# Patient Record
Sex: FEMALE | Race: WHITE | NOT HISPANIC OR LATINO | Employment: OTHER | URBAN - METROPOLITAN AREA
[De-identification: names, ages, dates, MRNs, and addresses within clinical notes are randomized per-mention and may not be internally consistent; named-entity substitution may affect disease eponyms.]

---

## 2017-01-04 ENCOUNTER — OFFICE VISIT (OUTPATIENT)
Dept: FAMILY MEDICINE CLINIC | Facility: CLINIC | Age: 82
End: 2017-01-04

## 2017-01-04 VITALS
SYSTOLIC BLOOD PRESSURE: 124 MMHG | WEIGHT: 129 LBS | TEMPERATURE: 97.7 F | OXYGEN SATURATION: 98 % | HEIGHT: 68 IN | HEART RATE: 98 BPM | BODY MASS INDEX: 19.55 KG/M2 | DIASTOLIC BLOOD PRESSURE: 66 MMHG

## 2017-01-04 DIAGNOSIS — R30.0 BURNING WITH URINATION: Primary | ICD-10-CM

## 2017-01-04 LAB
BACTERIA UR QL AUTO: ABNORMAL /HPF
BILIRUB UR QL STRIP: NEGATIVE
CLARITY UR: CLEAR
COLOR UR: YELLOW
GLUCOSE UR STRIP-MCNC: NEGATIVE MG/DL
HGB UR QL STRIP.AUTO: NEGATIVE
KETONES UR QL STRIP: NEGATIVE
LEUKOCYTE ESTERASE UR QL STRIP.AUTO: ABNORMAL
MUCOUS THREADS URNS QL MICRO: ABNORMAL /HPF
NITRITE UR QL STRIP: NEGATIVE
PH UR STRIP.AUTO: 6 [PH] (ref 4.6–8)
PROT UR QL STRIP: NEGATIVE
RBC # UR: ABNORMAL /HPF
REF LAB TEST METHOD: ABNORMAL
SP GR UR STRIP: 1.02 (ref 1–1.03)
SQUAMOUS #/AREA URNS HPF: ABNORMAL /HPF
UROBILINOGEN UR QL STRIP: ABNORMAL
WBC UR QL AUTO: ABNORMAL /HPF

## 2017-01-04 PROCEDURE — 81001 URINALYSIS AUTO W/SCOPE: CPT | Performed by: FAMILY MEDICINE

## 2017-01-04 PROCEDURE — 99213 OFFICE O/P EST LOW 20 MIN: CPT | Performed by: FAMILY MEDICINE

## 2017-01-04 RX ORDER — CIPROFLOXACIN 500 MG/1
500 TABLET, FILM COATED ORAL 2 TIMES DAILY
Qty: 14 TABLET | Refills: 0 | Status: SHIPPED | OUTPATIENT
Start: 2017-01-04 | End: 2017-01-11

## 2017-01-04 NOTE — PROGRESS NOTES
SUBJECTIVE:  The patient is a 81-year-old white female with symptoms of dysuria for about a week.  No fever or chills.  She has a history urinary tract infections and states this feels as if it is one.     PAST MEDICAL HISTORY:  Reviewed.    REVIEW OF SYSTEMS:  Please see above; 14 point ROS otherwise negative.      OBJECTIVE: Vitals signs are reviewed and are stable.         Heart:  Regular rate and rhythm.    Abdomen:   Soft, nontender.    Urinalysis: 6-12 WBCs      ASSESSMENT:     Acute cystitis    PLAN:   Cipro 500 twice a day.  Follow-up in 2 days if no better.  Notify sooner if problems.    Much of this encounter note is an electronic transcription/translation of spoken language to printed text.  The electronic translation of spoken language may permit erroneous, or at times, nonsensical words or phrases to be inadvertently transcribed.  Although I have reviewed the note for such errors, some may still exist.

## 2017-01-04 NOTE — MR AVS SNAPSHOT
Alona Cisneros   1/4/2017 4:10 PM   Office Visit    Dept Phone:  [phone]   Encounter #:  [id number]    Provider:  Pierre Ibrahim MD   Department:  [facility] FAMILY AND INTERNAL MED                Your Full Care Plan              Today's Medication Changes          These changes are accurate as of: 1/4/17  9:25 AM.  If you have any questions, ask your nurse or doctor.               New Medication(s)Ordered:     ciprofloxacin 500 MG tablet   Commonly known as:  CIPRO   Take 1 tablet by mouth 2 (Two) Times a Day for 7 days.   Started by:  Pierre Ibrahim MD            Where to Get Your Medications      These medications were sent to [address] ([place], [place] - [address] [facility] - [phone]  - [phone] FX  [address] [facility] [address]     Phone:  [phone]     ciprofloxacin 500 MG tablet                  Your Updated Medication List          This list is accurate as of: 1/4/17  9:25 AM.  Always use your most recent med list.                alendronate 70 MG tablet   Commonly known as:  FOSAMAX   Take 1 tablet by mouth every 7 days.       amLODIPine 5 MG tablet   Commonly known as:  NORVASC       aspirin 81 MG chewable tablet       ciprofloxacin 500 MG tablet   Commonly known as:  CIPRO   Take 1 tablet by mouth 2 (Two) Times a Day for 7 days.       citalopram 40 MG tablet   Commonly known as:  CELEXA   Take 1 tablet by mouth daily.       MV-ONE PO       primidone 50 MG tablet   Commonly known as:  MYSOLINE               We Performed the Following     Urinalysis With Microscopic     Urinalysis, Microscopic Only     Urinalysis       You Were Diagnosed With        Codes Comments    Burning with urination    -  Primary ICD-10-CM: R30.0  ICD-9-CM: 788.1       Instructions     None    Patient Instructions History      Upcoming Appointments     Visit Type Date Time Department    SAME DAY 1/4/2017  4:10 PM LUZ DE ANDA  "MARIAN      [employer] Signup     [facility] [employer] allows you to send messages to your doctor, view your test results, renew your prescriptions, schedule appointments, and more. To sign up, go to [employer] and click on the Sign Up Now link in the New User? box. Enter your [employer] Activation Code exactly as it appears below along with the last four digits of your Social Security Number and your Date of Birth () to complete the sign-up process. If you do not sign up before the expiration date, you must request a new code.    [employer] Activation Code: [email or web address]  Expires: 2017  9:25 AM    If you have questions, you can email [email or web address] or call [phone] to talk to our [employer] staff. Remember, [employer] is NOT to be used for urgent needs. For medical emergencies, dial 911.               Other Info from Your Visit           Your Appointments     2017  4:10 PM EST   Same Day with Pierre Ibrahim MD   [facility] FAMILY AND INTERNAL MED (--)    [address] [place] [id number] [phone]              Allergies     No Known Allergies      Reason for Visit     Urinary Tract Infection x's 4 days, mainly in the morning, burning, frequency       Vital Signs     Blood Pressure Pulse Temperature Height Weight Oxygen Saturation    124/66 (BP Location: Left arm, Patient Position: Sitting, Cuff Size: Adult) 98 97.7 °F (36.5 °C) (Oral) 68\" (172.7 cm) 129 lb (58.5 kg) 98%    Body Mass Index Smoking Status                19.61 kg/m2 Former Smoker          Problems and Diagnoses Noted     Burning with urination    -  Primary      Results     Urinalysis With Microscopic           Urinalysis      Component Value Standard Range & Units    Color, UA Yellow Yellow, Straw    Appearance, UA Clear Clear    pH, UA 6.0 4.6 - 8.0    Specific Gravity, UA 1.020 1.001 - 1.035    Glucose, UA Negative Negative    Ketones, UA Negative Negative    Bilirubin, UA " Negative Negative    Blood, UA Negative Negative    Protein, UA Negative Negative    Leuk Esterase, UA Small (1+) Negative    Nitrite, UA Negative Negative    Urobilinogen, UA 0.2 E.U./dL 0.2 - 1.0 E.U./dL                Urinalysis, Microscopic Only      Component Value Standard Range & Units    RBC, UA None Seen None Seen /HPF    WBC, UA 6-12 None Seen /HPF    Bacteria, UA None Seen None Seen /HPF    Squamous Epithelial Cells, UA 3-6 None Seen, 0-2 /HPF    Mucus, UA  None Seen, Trace /HPF    Mucus     Methodology Manual Light Microscopy

## 2017-01-09 ENCOUNTER — TELEPHONE (OUTPATIENT)
Dept: FAMILY MEDICINE CLINIC | Facility: CLINIC | Age: 82
End: 2017-01-09

## 2017-01-09 NOTE — TELEPHONE ENCOUNTER
----- Message from Pierre Ibrahim MD sent at 1/9/2017  4:06 PM EST -----  Contact: [phone]   May be she should call [facility]  ----- Message -----     From: Johana Adorno MA     Sent: 1/9/2017   3:37 PM       To: Pierre Ibrahim MD    Its 53.00 and that's the cheapest on her insurance  ----- Message -----     From: Pierre Ibrahim MD     Sent: 1/9/2017   3:27 PM       To: Johana Adorno MA    See if we have respaclick samples to give her.  If not see if  pharmacist can give cheaper version  ----- Message -----     From: Johana Adorno MA     Sent: 1/9/2017   3:19 PM       To: Pierre Ibrahim MD        ----- Message -----     From: Amaris Hutchison     Sent: 1/9/2017   3:14 PM       To: Johana Adorno MA    SHE MENTIONED SHE DIDN'T  THE FOLLOWING MEDICATION BECAUSE IT WAS TO EXPENSIVE.  SHE SAID IT WAS 50 DOLLARS.   VENTOLIN  (90 BASE) MCG/ACT inhaler 1 inhaler  Sig: INHALE TWO PUFFS BY MOUTH EVERY 6 HOURS AS NEEDED FOR WHEEZING/ COUGH            She will check prices at [facility]

## 2017-01-10 RX ORDER — ALBUTEROL SULFATE 90 UG/1
2 AEROSOL, METERED RESPIRATORY (INHALATION) EVERY 4 HOURS PRN
Qty: 1 INHALER | Refills: 11 | Status: SHIPPED | OUTPATIENT
Start: 2017-01-10 | End: 2020-02-26

## 2017-01-13 ENCOUNTER — TELEPHONE (OUTPATIENT)
Dept: FAMILY MEDICINE CLINIC | Facility: CLINIC | Age: 82
End: 2017-01-13

## 2017-01-13 NOTE — TELEPHONE ENCOUNTER
PT INFORMED NEEDS TO GO TO URGENT CARE OR [facility]    ----- Message from Gudelia Maurice sent at 1/13/2017  1:15 PM EST -----  Contact: PATIENT [phone]  PATIENT HAS A COUGH AND SHORTNESS OF BREATH. PATIENT HAS GONE THROUGH 2 BOTTLES OF MUCINEX AND HAS AN INHALER. EITHER ONE IS WORKING. IS THERE SOMETHING OTC OR COULD BE CALLED IN THAT PATIENT CAN TAKE         [facility] [address] [place] [address], [place] - [address] [facility] - [phone] [facility] [phone]  [phone] (Phone)

## 2017-01-16 DIAGNOSIS — D72.819 LEUKOPENIA, UNSPECIFIED TYPE: Primary | ICD-10-CM

## 2017-01-17 ENCOUNTER — OFFICE VISIT (OUTPATIENT)
Dept: FAMILY MEDICINE CLINIC | Facility: CLINIC | Age: 82
End: 2017-01-17

## 2017-01-17 VITALS
BODY MASS INDEX: 18.34 KG/M2 | RESPIRATION RATE: 22 BRPM | TEMPERATURE: 97.8 F | HEIGHT: 68 IN | WEIGHT: 121 LBS | DIASTOLIC BLOOD PRESSURE: 79 MMHG | HEART RATE: 100 BPM | SYSTOLIC BLOOD PRESSURE: 120 MMHG | OXYGEN SATURATION: 95 %

## 2017-01-17 DIAGNOSIS — D72.819 LEUKOPENIA, UNSPECIFIED TYPE: ICD-10-CM

## 2017-01-17 DIAGNOSIS — J10.1 INFLUENZA A: Primary | ICD-10-CM

## 2017-01-17 LAB
ERYTHROCYTE [DISTWIDTH] IN BLOOD BY AUTOMATED COUNT: 14 % (ref 4.5–15)
HCT VFR BLD AUTO: 42.1 % (ref 31–42)
HGB BLD-MCNC: 14.6 G/DL (ref 12–18)
LYMPHOCYTES # BLD AUTO: 0.9 10*3/MM3 (ref 1.2–3.4)
LYMPHOCYTES NFR BLD AUTO: 20.9 % (ref 21–51)
MCH RBC QN AUTO: 30.7 PG (ref 26.1–33.1)
MCHC RBC AUTO-ENTMCNC: 34.6 G/DL (ref 33–37)
MCV RBC AUTO: 88.6 FL (ref 80–99)
MONOCYTES # BLD AUTO: 0.2 10*3/MM3 (ref 0.1–0.6)
MONOCYTES NFR BLD AUTO: 4.4 % (ref 2–9)
NEUTROPHILS # BLD AUTO: 3.2 10*3/MM3 (ref 1.4–6.5)
NEUTROPHILS NFR BLD AUTO: 74.7 % (ref 42–75)
PLATELET # BLD AUTO: 301 10*3/MM3 (ref 150–450)
PMV BLD AUTO: 6.9 FL (ref 7.1–10.5)
RBC # BLD AUTO: 4.75 10*6/MM3 (ref 4–6)
WBC NRBC COR # BLD: 4.3 10*3/MM3 (ref 4.5–10)

## 2017-01-17 PROCEDURE — 99213 OFFICE O/P EST LOW 20 MIN: CPT | Performed by: FAMILY MEDICINE

## 2017-01-17 PROCEDURE — 85025 COMPLETE CBC W/AUTO DIFF WBC: CPT | Performed by: FAMILY MEDICINE

## 2017-01-17 RX ORDER — BENZONATATE 100 MG/1
100 CAPSULE ORAL 3 TIMES DAILY PRN
Refills: 0 | COMMUNITY
End: 2017-01-17 | Stop reason: SDUPTHER

## 2017-01-17 RX ORDER — BENZONATATE 100 MG/1
100 CAPSULE ORAL 3 TIMES DAILY PRN
Qty: 30 CAPSULE | Refills: 0 | Status: SHIPPED | OUTPATIENT
Start: 2017-01-17 | End: 2017-01-27 | Stop reason: SDUPTHER

## 2017-01-17 NOTE — MR AVS SNAPSHOT
Alona Cisneros   1/17/2017 2:10 PM   Office Visit    Dept Phone:  [phone]   Encounter #:  [id number]    Provider:  Pierre Ibrahim MD   Department:  [facility] FAMILY AND INTERNAL MED                Your Full Care Plan              Today's Medication Changes          These changes are accurate as of: 1/17/17  3:01 PM.  If you have any questions, ask your nurse or doctor.               New Medication(s)Ordered:     HYDROcodone-homatropine 5-1.5 MG/5ML syrup   Commonly known as:  HYCODAN   Take 5 mL by mouth Every 8 (Eight) Hours As Needed for cough. 1 tsp Q 6 Hr prn   Started by:  Pierre Ibrahim MD            Where to Get Your Medications      You can get these medications from any pharmacy     Bring a paper prescription for each of these medications     HYDROcodone-homatropine 5-1.5 MG/5ML syrup                  Your Updated Medication List          This list is accurate as of: 1/17/17  3:01 PM.  Always use your most recent med list.                albuterol 108 (90 BASE) MCG/ACT inhaler   Commonly known as:  VENTOLIN HFA   Inhale 2 puffs Every 4 (Four) Hours As Needed for wheezing.       alendronate 70 MG tablet   Commonly known as:  FOSAMAX   Take 1 tablet by mouth every 7 days.       amLODIPine 5 MG tablet   Commonly known as:  NORVASC       aspirin 81 MG chewable tablet       citalopram 40 MG tablet   Commonly known as:  CELEXA   Take 1 tablet by mouth daily.       HYDROcodone-homatropine 5-1.5 MG/5ML syrup   Commonly known as:  HYCODAN   Take 5 mL by mouth Every 8 (Eight) Hours As Needed for cough. 1 tsp Q 6 Hr prn       MV-ONE PO       primidone 50 MG tablet   Commonly known as:  MYSOLINE       TESSALON PERLES 100 MG capsule   Generic drug:  benzonatate               You Were Diagnosed With        Codes Comments    Influenza A    -  Primary ICD-10-CM: J10.1  ICD-9-CM: 487.1     Leukopenia, unspecified type     ICD-10-CM: D72.819  ICD-9-CM: 288.50       Instructions   "   None    Patient Instructions History      Upcoming Appointments     Visit Type Date Time Department    SAME DAY 2017  2:10 PM LUZ FONSECA      appssavvy Signup     [facility] [employer] allows you to send messages to your doctor, view your test results, renew your prescriptions, schedule appointments, and more. To sign up, go to [employer] and click on the Sign Up Now link in the New User? box. Enter your [employer] Activation Code exactly as it appears below along with the last four digits of your Social Security Number and your Date of Birth () to complete the sign-up process. If you do not sign up before the expiration date, you must request a new code.    [employer] Activation Code: [email or web address]  Expires: 2017  9:25 AM    If you have questions, you can email [email or web address] or call [phone] to talk to our [employer] staff. Remember, [employer] is NOT to be used for urgent needs. For medical emergencies, dial 911.               Other Info from Your Visit           Allergies     No Known Allergies      Reason for Visit     Influenza seen ER 1-13 + influ A    Shortness of Breath     Cough           Vital Signs     Blood Pressure Pulse Temperature Respirations Height Weight    120/79 (BP Location: Left arm, Patient Position: Sitting) 100 97.8 °F (36.6 °C) (Oral) 22 68\" (172.7 cm) 121 lb (54.9 kg)    Oxygen Saturation Body Mass Index Smoking Status             95% 18.4 kg/m2 Former Smoker         Problems and Diagnoses Noted     Influenza A    -  Primary    Decreased white blood cell count            "

## 2017-01-17 NOTE — PROGRESS NOTES
SUBJECTIVE:  The patient is a 81-year-old white female who was diagnosed with influenza A on January 13 in the emergency room.  She was noted to have a leukopenia of 2000.  Her most previous accessible white blood cell count was 4600 a few months ago.  She has a history of anemia.  She still does not feel very well and is somewhat winded.  Please refer to hospital visit for specific details.     PAST MEDICAL HISTORY:  Reviewed.    REVIEW OF SYSTEMS:  Please see above; 14 point ROS otherwise negative.      OBJECTIVE: Vitals signs are reviewed and are stable.    HEENT: PERRLA.    Neck:  Supple.    Lungs:  Clear.  No rales or wheezes currently  Heart:  Regular rate and rhythm.    Abdomen:   Soft, nontender.    Extremities:  No cyanosis, clubbing or edema.      ASSESSMENT:     Influenza a  Leukopenia  Repeat CBC here today shows 4600    PLAN:   The patient will continue supportive treatment of her flulike illness.  Should she have any problems or no not improved in the next few days she or her family will let me know.  She is advised to go to emergency room if any significant change in her condition.  Hydromet 1/2 teaspoon every 8 hours is prescribed.  She is to rest as much as she can.  She will call a couple days and let me know how she's doing.    Much of this encounter note is an electronic transcription/translation of spoken language to printed text.  The electronic translation of spoken language may permit erroneous, or at times, nonsensical words or phrases to be inadvertently transcribed.  Although I have reviewed the note for such errors, some may still exist.

## 2017-01-27 RX ORDER — BENZONATATE 100 MG/1
CAPSULE ORAL
Qty: 30 CAPSULE | Refills: 0 | Status: SHIPPED | OUTPATIENT
Start: 2017-01-27 | End: 2017-06-01

## 2017-05-22 ENCOUNTER — TRANSCRIBE ORDERS (OUTPATIENT)
Dept: ADMINISTRATIVE | Facility: HOSPITAL | Age: 82
End: 2017-05-22

## 2017-05-22 DIAGNOSIS — M54.5 LOW BACK PAIN, UNSPECIFIED BACK PAIN LATERALITY, UNSPECIFIED CHRONICITY, WITH SCIATICA PRESENCE UNSPECIFIED: Primary | ICD-10-CM

## 2017-06-01 ENCOUNTER — ANESTHESIA (OUTPATIENT)
Dept: PAIN MEDICINE | Facility: HOSPITAL | Age: 82
End: 2017-06-01

## 2017-06-01 ENCOUNTER — ANESTHESIA EVENT (OUTPATIENT)
Dept: PAIN MEDICINE | Facility: HOSPITAL | Age: 82
End: 2017-06-01

## 2017-06-01 ENCOUNTER — HOSPITAL ENCOUNTER (OUTPATIENT)
Dept: PAIN MEDICINE | Facility: HOSPITAL | Age: 82
Discharge: HOME | End: 2017-06-01
Attending: ORTHOPAEDIC SURGERY | Admitting: ORTHOPAEDIC SURGERY

## 2017-06-01 ENCOUNTER — HOSPITAL ENCOUNTER (OUTPATIENT)
Dept: GENERAL RADIOLOGY | Facility: HOSPITAL | Age: 82
Discharge: HOME | End: 2017-06-01

## 2017-06-01 VITALS
DIASTOLIC BLOOD PRESSURE: 70 MMHG | RESPIRATION RATE: 16 BRPM | BODY MASS INDEX: 19.7 KG/M2 | HEART RATE: 79 BPM | TEMPERATURE: 97.6 F | OXYGEN SATURATION: 100 % | SYSTOLIC BLOOD PRESSURE: 146 MMHG | HEIGHT: 68 IN | WEIGHT: 130 LBS

## 2017-06-01 DIAGNOSIS — M54.5 LOW BACK PAIN, UNSPECIFIED BACK PAIN LATERALITY, UNSPECIFIED CHRONICITY, WITH SCIATICA PRESENCE UNSPECIFIED: ICD-10-CM

## 2017-06-01 DIAGNOSIS — R52 PAIN: ICD-10-CM

## 2017-06-01 PROBLEM — M54.16 LUMBAR NEURITIS: Status: ACTIVE | Noted: 2017-06-01

## 2017-06-01 PROBLEM — M51.37 DEGENERATION OF LUMBAR OR LUMBOSACRAL INTERVERTEBRAL DISC: Status: ACTIVE | Noted: 2017-06-01

## 2017-06-01 PROCEDURE — 25010000002 METHYLPREDNISOLONE PER 80 MG: Performed by: ANESTHESIOLOGY

## 2017-06-01 PROCEDURE — C1755 CATHETER, INTRASPINAL: HCPCS

## 2017-06-01 PROCEDURE — 0 IOPAMIDOL 41 % SOLUTION: Performed by: ANESTHESIOLOGY

## 2017-06-01 PROCEDURE — 77003 FLUOROGUIDE FOR SPINE INJECT: CPT

## 2017-06-01 RX ORDER — METHYLPREDNISOLONE ACETATE 80 MG/ML
80 INJECTION, SUSPENSION INTRA-ARTICULAR; INTRALESIONAL; INTRAMUSCULAR; SOFT TISSUE ONCE
Status: COMPLETED | OUTPATIENT
Start: 2017-06-01 | End: 2017-06-01

## 2017-06-01 RX ORDER — ACETAMINOPHEN 325 MG/1
650 TABLET ORAL EVERY 6 HOURS PRN
COMMUNITY
End: 2018-02-21 | Stop reason: HOSPADM

## 2017-06-01 RX ORDER — FENTANYL CITRATE 50 UG/ML
50 INJECTION, SOLUTION INTRAMUSCULAR; INTRAVENOUS
Status: DISCONTINUED | OUTPATIENT
Start: 2017-06-01 | End: 2017-06-02 | Stop reason: HOSPADM

## 2017-06-01 RX ORDER — MIRTAZAPINE 15 MG/1
7.5 TABLET, FILM COATED ORAL NIGHTLY
COMMUNITY

## 2017-06-01 RX ORDER — LIDOCAINE HYDROCHLORIDE 10 MG/ML
1 INJECTION, SOLUTION INFILTRATION; PERINEURAL ONCE
Status: DISCONTINUED | OUTPATIENT
Start: 2017-06-01 | End: 2017-06-02 | Stop reason: HOSPADM

## 2017-06-01 RX ORDER — DONEPEZIL HYDROCHLORIDE 5 MG/1
10 TABLET, FILM COATED ORAL NIGHTLY
COMMUNITY

## 2017-06-01 RX ORDER — SODIUM CHLORIDE 0.9 % (FLUSH) 0.9 %
1-10 SYRINGE (ML) INJECTION AS NEEDED
Status: DISCONTINUED | OUTPATIENT
Start: 2017-06-01 | End: 2017-06-02 | Stop reason: HOSPADM

## 2017-06-01 RX ORDER — MIDAZOLAM HYDROCHLORIDE 1 MG/ML
1 INJECTION INTRAMUSCULAR; INTRAVENOUS
Status: DISCONTINUED | OUTPATIENT
Start: 2017-06-01 | End: 2017-06-02 | Stop reason: HOSPADM

## 2017-06-01 RX ORDER — LORAZEPAM 0.5 MG/1
0.5 TABLET ORAL DAILY
Status: ON HOLD | COMMUNITY
End: 2018-02-21

## 2017-06-01 RX ADMIN — METHYLPREDNISOLONE ACETATE 80 MG: 80 INJECTION, SUSPENSION INTRA-ARTICULAR; INTRALESIONAL; INTRAMUSCULAR; SOFT TISSUE at 13:34

## 2017-06-01 RX ADMIN — IOPAMIDOL 10 ML: 408 INJECTION, SOLUTION INTRATHECAL at 13:34

## 2017-06-01 NOTE — ANESTHESIA PROCEDURE NOTES
PAIN Epidural block    Patient location during procedure: pain clinic  Indication:procedure for pain  Performed By  Anesthesiologist: YASH WIN  Preanesthetic Checklist  Completed: patient identified and risks and benefits discussed  Additional Notes  Diagnosis:     Degeneration of lumbar intervertebral disc  Lumbar Neuritis    Sedation:      A lumbar epidural steroid injection with fluoroscopic guidance and an Isovue dye epidurogram was performed.  Under fluoroscopic guidance, the epidural space was identified and accessed, confirmed by loss of resistance to saline followed by injection of Isovue dye, which shows a good epidurogram.  The above medications were injected uneventfully.    Epidural Block Prep:  Pt Position:prone  Sterile Tech:cap, gloves, mask and sterile barrier  Prep:chlorhexidine gluconate and isopropyl alcohol  Monitoring:blood pressure monitoring, continuous pulse oximetry and EKG  Epidural Block Procedure:  Approach:midline  Guidance: fluoroscopy  Location:lumbar  Interspace: L5-S1.  Needle Type:Tuohy  Needle Gauge:20  Aspiration:negative  Medications:  Depomedrol:80  Preservative Free Saline:3mL  Isovue:2mL  Comments:Isovue dye reveals good epidurogram  Post Assessment:  Pt Tolerance:patient tolerated the procedure well with no apparent complications  Complications:no

## 2017-06-01 NOTE — PLAN OF CARE
Problem: Pain, Chronic (Adult)  Goal: Acceptable Pain Control/Comfort Level  Outcome: Unable to achieve outcome(s) by discharge Date Met:  06/01/17

## 2017-06-01 NOTE — PLAN OF CARE
Problem: Pain, Chronic (Adult)  Goal: Identify Related Risk Factors and Signs and Symptoms  Outcome: Unable to achieve outcome(s) by discharge Date Met:  06/01/17

## 2017-06-01 NOTE — H&P
CHIEF COMPLAINT: back and right lower extremity pain      HISTORY OF PRESENT ILLNESS:  She has had several back surgeries and most recent being over 15 years ago.  She currently complains of low lumbar back pain which radiates laterally down her right lower extremity to the knee.  Intermittent in timing.  Between a 1 and 9 out of 10 on the pain scale.  Described as sharp tingling and burning. Exacerbated by activity.  She does use a heating padswith some relief.  She has not done physical therapy.    PAST MEDICAL HISTORY:  No allergies  Medications include Ventolin NorvascCelexa Aricept Ativan and Remeron  History of hypertension anxiety  History of 3 back surgeries    SOCIAL HISTORY:  Former smoker none currently    REVIEW OF SYSTEMS:  No hematologic infectious or constitutional symptoms    PHYSICAL EXAM:  /84 pulse 86 respirations 16 sat 98% temp 36.4 height 5 foot 8 weight 130 pounds BMI 19.8  Well-developed well-nourished no acute distress  Extra ocular movements intact  Mallampati class II airway  Cardiac:  Regular rate and rhythm  Lungs:  Clear to auscultation bilaterally with good effort  Alert and oriented ×3  Deep tendon reflexes decreasedl in the bilateral patella  Negative straight leg raise bilaterally  5 out of 5 strength bilateral upper and lower extremities  Lumbar spine without obvious deformities ecchymoses  Lumbar spine nontender to palpation      DIAGNOSIS:  Degeneration of lumbar intervertebral disc  Lumbar Neuritis      PLAN:  1.  Lumbar epidural steroid injections, up to 3, spaced 1-2 weeks apart.  If pain control is acceptable after 1 or 2 injections, it was discussed with the patient that they may return for the subsequent injections if and when their pain returns.  The risks were discussed with the patient including failure of relief, worsening pain, Headache (post dural puncture headache), bleeding (epidural hematoma) and infection (epidural abscess or skin infection).  2.  Physical  therapy exercises at home as prescribed by physical therapy or from the pain clinic handout (given to the patient).  Continuation of these exercises every day, or multiple times per week, even when the patient has good pain relief, was stressed to the patient as a preventative measure to decrease the frequency and severity of future pain episodes.  3.  Continue pain medicines as already prescribed.  If patient not currently taking any, it is recommended to begin Acetaminophen 1000 mg po q 8 hours.  If other medicines containing Acetaminophen are currently prescribed, maintain daily dose at 3000 mg.    4.  If they can tolerate NSAIDS, it is recommended to take Ibuprofen 600 mg po q 6 hours for 7 days during pain exacerbations.  Alternatively, they may substitute an NSAID of their choice (e.g. Aleve).  This may be taken at the same time as Acetaminophen.  5.  Heat and ice to the affected area as tolerated for pain control.  It was discussed that heating pads can cause burns.  6.  Daily low impact exercise such as walking or water exercise was recommended to maintain overall health and aid in weight control.   7.  Follow up as needed for subsequent injections.  8.  Patient was counseled to abstain from tobacco products.

## 2017-07-31 ENCOUNTER — TELEPHONE (OUTPATIENT)
Dept: FAMILY MEDICINE CLINIC | Facility: CLINIC | Age: 82
End: 2017-07-31

## 2017-07-31 NOTE — TELEPHONE ENCOUNTER
PT HAS CANCELED HER APPT WITH ENDOCRINE SPECIALIST. SHE DIDN'T KNOW IF IT'S STILL NEEDED. CAN YOU CALL AND LET HER KNOW.

## 2017-08-04 ENCOUNTER — TELEPHONE (OUTPATIENT)
Dept: FAMILY MEDICINE CLINIC | Facility: CLINIC | Age: 82
End: 2017-08-04

## 2017-08-04 NOTE — TELEPHONE ENCOUNTER
PT DAUGHTER CALLED AND WANTS TO KNOW IF [facility]WD CAN FOLLOW HER MOTHER THYROID ISSUES INSTEAD OF HER GOING TO SEE A SPECIALIST. THE SPECIALIST TOLD HER HE FOUND NOTHING ON HER US AND THAT SHE DOESN'T NEED ONE FOR ANOTHER 2 YEARS AND SHE ISN'T ON ANY MEDICATION.    BRENDAN- [phone]

## 2017-08-04 NOTE — TELEPHONE ENCOUNTER
BEEN GOING TO [facility] FOR NODULE ON THYROID, WAS TOLD YUDY COULD BE THE ONE TO CHECK ON IT ON A YEARLY BASIS. PT WANTS TO KNOW IF THIS IS SOMETHING DR MAO WILL DO.

## 2017-08-11 DIAGNOSIS — E04.1 THYROID NODULE: Primary | ICD-10-CM

## 2018-02-17 ENCOUNTER — HOSPITAL ENCOUNTER (INPATIENT)
Facility: HOSPITAL | Age: 83
LOS: 4 days | Discharge: NURSING, REHAB OR LONG-TERM CARE | End: 2018-02-21
Attending: EMERGENCY MEDICINE | Admitting: HOSPITALIST

## 2018-02-17 ENCOUNTER — APPOINTMENT (OUTPATIENT)
Dept: GENERAL RADIOLOGY | Facility: HOSPITAL | Age: 83
End: 2018-02-17

## 2018-02-17 DIAGNOSIS — Z74.09 IMPAIRED FUNCTIONAL MOBILITY, BALANCE, GAIT, AND ENDURANCE: ICD-10-CM
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
## 2018-02-17 DIAGNOSIS — S72.331A CLOSED DISPLACED OBLIQUE FRACTURE OF SHAFT OF RIGHT FEMUR, INITIAL ENCOUNTER (HCC): Primary | ICD-10-CM

## 2018-02-17 PROBLEM — F41.9 ANXIETY: Status: ACTIVE | Noted: 2018-02-17

## 2018-02-17 PROBLEM — I10 HYPERTENSION: Status: ACTIVE | Noted: 2018-02-17

## 2018-02-17 PROBLEM — R25.1 TREMORS OF NERVOUS SYSTEM: Status: ACTIVE | Noted: 2018-02-17

## 2018-02-17 LAB
ALBUMIN SERPL-MCNC: 3.8 G/DL (ref 3.5–5.2)
ALBUMIN/GLOB SERPL: 1.5 G/DL
ALP SERPL-CCNC: 69 U/L (ref 39–117)
ALT SERPL W P-5'-P-CCNC: 20 U/L (ref 1–33)
ANION GAP SERPL CALCULATED.3IONS-SCNC: 11.7 MMOL/L
APTT PPP: 25.8 SECONDS (ref 22.7–35.4)
AST SERPL-CCNC: 22 U/L (ref 1–32)
BASOPHILS # BLD AUTO: 0.05 10*3/MM3 (ref 0–0.2)
BASOPHILS NFR BLD AUTO: 1.1 % (ref 0–1.5)
BILIRUB SERPL-MCNC: 0.4 MG/DL (ref 0.1–1.2)
BUN BLD-MCNC: 14 MG/DL (ref 8–23)
BUN/CREAT SERPL: 22.2 (ref 7–25)
CALCIUM SPEC-SCNC: 8.9 MG/DL (ref 8.6–10.5)
CHLORIDE SERPL-SCNC: 102 MMOL/L (ref 98–107)
CO2 SERPL-SCNC: 27.3 MMOL/L (ref 22–29)
CREAT BLD-MCNC: 0.63 MG/DL (ref 0.57–1)
DEPRECATED RDW RBC AUTO: 50 FL (ref 37–54)
EOSINOPHIL # BLD AUTO: 0.17 10*3/MM3 (ref 0–0.7)
EOSINOPHIL NFR BLD AUTO: 3.9 % (ref 0.3–6.2)
ERYTHROCYTE [DISTWIDTH] IN BLOOD BY AUTOMATED COUNT: 13.9 % (ref 11.7–13)
ETHANOL BLD-MCNC: 11 MG/DL (ref 0–10)
ETHANOL UR QL: 0.01 %
GFR SERPL CREATININE-BSD FRML MDRD: 91 ML/MIN/1.73
GLOBULIN UR ELPH-MCNC: 2.5 GM/DL
GLUCOSE BLD-MCNC: 96 MG/DL (ref 65–99)
HCT VFR BLD AUTO: 42.9 % (ref 35.6–45.5)
HGB BLD-MCNC: 13.6 G/DL (ref 11.9–15.5)
IMM GRANULOCYTES # BLD: 0.02 10*3/MM3 (ref 0–0.03)
IMM GRANULOCYTES NFR BLD: 0.5 % (ref 0–0.5)
INR PPP: 1.09 (ref 0.9–1.1)
LYMPHOCYTES # BLD AUTO: 1.22 10*3/MM3 (ref 0.9–4.8)
LYMPHOCYTES NFR BLD AUTO: 27.9 % (ref 19.6–45.3)
MCH RBC QN AUTO: 30.8 PG (ref 26.9–32)
MCHC RBC AUTO-ENTMCNC: 31.7 G/DL (ref 32.4–36.3)
MCV RBC AUTO: 97.1 FL (ref 80.5–98.2)
MONOCYTES # BLD AUTO: 0.34 10*3/MM3 (ref 0.2–1.2)
MONOCYTES NFR BLD AUTO: 7.8 % (ref 5–12)
NEUTROPHILS # BLD AUTO: 2.58 10*3/MM3 (ref 1.9–8.1)
NEUTROPHILS NFR BLD AUTO: 58.8 % (ref 42.7–76)
PLATELET # BLD AUTO: 189 10*3/MM3 (ref 140–500)
PMV BLD AUTO: 11.6 FL (ref 6–12)
POTASSIUM BLD-SCNC: 3.6 MMOL/L (ref 3.5–5.2)
PROT SERPL-MCNC: 6.3 G/DL (ref 6–8.5)
PROTHROMBIN TIME: 13.9 SECONDS (ref 11.7–14.2)
RBC # BLD AUTO: 4.42 10*6/MM3 (ref 3.9–5.2)
SODIUM BLD-SCNC: 141 MMOL/L (ref 136–145)
WBC NRBC COR # BLD: 4.38 10*3/MM3 (ref 4.5–10.7)

## 2018-02-17 PROCEDURE — 80053 COMPREHEN METABOLIC PANEL: CPT | Performed by: EMERGENCY MEDICINE

## 2018-02-17 PROCEDURE — 25010000002 MORPHINE PER 10 MG

## 2018-02-17 PROCEDURE — 25010000002 THIAMINE PER 100 MG: Performed by: ORTHOPAEDIC SURGERY

## 2018-02-17 PROCEDURE — 73552 X-RAY EXAM OF FEMUR 2/>: CPT

## 2018-02-17 PROCEDURE — 93005 ELECTROCARDIOGRAM TRACING: CPT | Performed by: INTERNAL MEDICINE

## 2018-02-17 PROCEDURE — 99221 1ST HOSP IP/OBS SF/LOW 40: CPT | Performed by: ORTHOPAEDIC SURGERY

## 2018-02-17 PROCEDURE — 99285 EMERGENCY DEPT VISIT HI MDM: CPT

## 2018-02-17 PROCEDURE — 73502 X-RAY EXAM HIP UNI 2-3 VIEWS: CPT

## 2018-02-17 PROCEDURE — 25010000002 MORPHINE PER 10 MG: Performed by: EMERGENCY MEDICINE

## 2018-02-17 PROCEDURE — 85025 COMPLETE CBC W/AUTO DIFF WBC: CPT | Performed by: EMERGENCY MEDICINE

## 2018-02-17 PROCEDURE — 25010000002 ONDANSETRON PER 1 MG: Performed by: EMERGENCY MEDICINE

## 2018-02-17 PROCEDURE — 85730 THROMBOPLASTIN TIME PARTIAL: CPT | Performed by: EMERGENCY MEDICINE

## 2018-02-17 PROCEDURE — 99221 1ST HOSP IP/OBS SF/LOW 40: CPT | Performed by: INTERNAL MEDICINE

## 2018-02-17 PROCEDURE — 80307 DRUG TEST PRSMV CHEM ANLYZR: CPT | Performed by: EMERGENCY MEDICINE

## 2018-02-17 PROCEDURE — 71045 X-RAY EXAM CHEST 1 VIEW: CPT

## 2018-02-17 PROCEDURE — 25010000002 MAGNESIUM SULFATE PER 500 MG OF MAGNESIUM: Performed by: ORTHOPAEDIC SURGERY

## 2018-02-17 PROCEDURE — 93010 ELECTROCARDIOGRAM REPORT: CPT | Performed by: INTERNAL MEDICINE

## 2018-02-17 PROCEDURE — 85610 PROTHROMBIN TIME: CPT | Performed by: EMERGENCY MEDICINE

## 2018-02-17 RX ORDER — PRIMIDONE 50 MG/1
100 TABLET ORAL DAILY
Status: DISCONTINUED | OUTPATIENT
Start: 2018-02-17 | End: 2018-02-21 | Stop reason: HOSPADM

## 2018-02-17 RX ORDER — DIPHENOXYLATE HYDROCHLORIDE AND ATROPINE SULFATE 2.5; .025 MG/1; MG/1
1 TABLET ORAL DAILY
Status: DISCONTINUED | OUTPATIENT
Start: 2018-02-17 | End: 2018-02-21 | Stop reason: HOSPADM

## 2018-02-17 RX ORDER — SODIUM CHLORIDE 0.9 % (FLUSH) 0.9 %
10 SYRINGE (ML) INJECTION AS NEEDED
Status: DISCONTINUED | OUTPATIENT
Start: 2018-02-17 | End: 2018-02-21 | Stop reason: HOSPADM

## 2018-02-17 RX ORDER — HYDROCODONE BITARTRATE AND ACETAMINOPHEN 5; 325 MG/1; MG/1
1 TABLET ORAL EVERY 6 HOURS PRN
Status: DISCONTINUED | OUTPATIENT
Start: 2018-02-17 | End: 2018-02-19

## 2018-02-17 RX ORDER — ACETAMINOPHEN 325 MG/1
650 TABLET ORAL EVERY 6 HOURS PRN
Status: DISCONTINUED | OUTPATIENT
Start: 2018-02-17 | End: 2018-02-21 | Stop reason: HOSPADM

## 2018-02-17 RX ORDER — MORPHINE SULFATE 2 MG/ML
4 INJECTION, SOLUTION INTRAMUSCULAR; INTRAVENOUS
Status: DISCONTINUED | OUTPATIENT
Start: 2018-02-17 | End: 2018-02-17

## 2018-02-17 RX ORDER — ONDANSETRON 2 MG/ML
4 INJECTION INTRAMUSCULAR; INTRAVENOUS ONCE
Status: COMPLETED | OUTPATIENT
Start: 2018-02-17 | End: 2018-02-17

## 2018-02-17 RX ORDER — AMLODIPINE BESYLATE 5 MG/1
5 TABLET ORAL DAILY
Status: DISCONTINUED | OUTPATIENT
Start: 2018-02-17 | End: 2018-02-21 | Stop reason: HOSPADM

## 2018-02-17 RX ORDER — ONDANSETRON 2 MG/ML
4 INJECTION INTRAMUSCULAR; INTRAVENOUS EVERY 6 HOURS PRN
Status: DISCONTINUED | OUTPATIENT
Start: 2018-02-17 | End: 2018-02-21 | Stop reason: HOSPADM

## 2018-02-17 RX ORDER — SODIUM CHLORIDE 9 MG/ML
125 INJECTION, SOLUTION INTRAVENOUS CONTINUOUS
Status: DISCONTINUED | OUTPATIENT
Start: 2018-02-17 | End: 2018-02-19

## 2018-02-17 RX ORDER — MIRTAZAPINE 15 MG/1
15 TABLET, FILM COATED ORAL NIGHTLY
Status: DISCONTINUED | OUTPATIENT
Start: 2018-02-17 | End: 2018-02-21 | Stop reason: HOSPADM

## 2018-02-17 RX ORDER — SODIUM CHLORIDE 0.9 % (FLUSH) 0.9 %
1-10 SYRINGE (ML) INJECTION AS NEEDED
Status: DISCONTINUED | OUTPATIENT
Start: 2018-02-17 | End: 2018-02-19

## 2018-02-17 RX ORDER — PRIMIDONE 50 MG/1
50 TABLET ORAL NIGHTLY
Status: DISCONTINUED | OUTPATIENT
Start: 2018-02-17 | End: 2018-02-17

## 2018-02-17 RX ORDER — HYDROMORPHONE HYDROCHLORIDE 1 MG/ML
0.5 INJECTION, SOLUTION INTRAMUSCULAR; INTRAVENOUS; SUBCUTANEOUS
Status: DISCONTINUED | OUTPATIENT
Start: 2018-02-17 | End: 2018-02-19

## 2018-02-17 RX ORDER — DONEPEZIL HYDROCHLORIDE 5 MG/1
5 TABLET, FILM COATED ORAL NIGHTLY
Status: DISCONTINUED | OUTPATIENT
Start: 2018-02-17 | End: 2018-02-21 | Stop reason: HOSPADM

## 2018-02-17 RX ORDER — CITALOPRAM 20 MG/1
20 TABLET ORAL DAILY
Status: DISCONTINUED | OUTPATIENT
Start: 2018-02-17 | End: 2018-02-21 | Stop reason: HOSPADM

## 2018-02-17 RX ORDER — LORAZEPAM 0.5 MG/1
0.5 TABLET ORAL DAILY
Status: DISCONTINUED | OUTPATIENT
Start: 2018-02-17 | End: 2018-02-21 | Stop reason: HOSPADM

## 2018-02-17 RX ADMIN — ONDANSETRON 4 MG: 2 INJECTION INTRAMUSCULAR; INTRAVENOUS at 04:34

## 2018-02-17 RX ADMIN — MAGNESIUM SULFATE HEPTAHYDRATE 100 ML/HR: 500 INJECTION, SOLUTION INTRAMUSCULAR; INTRAVENOUS at 13:49

## 2018-02-17 RX ADMIN — CALCIUM CARBONATE-VITAMIN D TAB 500 MG-200 UNIT 500 MG: 500-200 TAB at 20:29

## 2018-02-17 RX ADMIN — MORPHINE SULFATE 4 MG: 4 INJECTION INTRAVENOUS at 06:11

## 2018-02-17 RX ADMIN — CITALOPRAM 20 MG: 20 TABLET, FILM COATED ORAL at 10:15

## 2018-02-17 RX ADMIN — DONEPEZIL HYDROCHLORIDE 5 MG: 5 TABLET, FILM COATED ORAL at 20:29

## 2018-02-17 RX ADMIN — HYDROCODONE BITARTRATE AND ACETAMINOPHEN 1 TABLET: 5; 325 TABLET ORAL at 12:00

## 2018-02-17 RX ADMIN — HYDROCODONE BITARTRATE AND ACETAMINOPHEN 1 TABLET: 5; 325 TABLET ORAL at 22:33

## 2018-02-17 RX ADMIN — LORAZEPAM 0.5 MG: 0.5 TABLET ORAL at 10:15

## 2018-02-17 RX ADMIN — PRIMIDONE 100 MG: 50 TABLET ORAL at 12:01

## 2018-02-17 RX ADMIN — CALCIUM CARBONATE-VITAMIN D TAB 500 MG-200 UNIT 500 MG: 500-200 TAB at 10:16

## 2018-02-17 RX ADMIN — SODIUM CHLORIDE 125 ML/HR: 9 INJECTION, SOLUTION INTRAVENOUS at 04:32

## 2018-02-17 RX ADMIN — MORPHINE SULFATE 4 MG: 4 INJECTION INTRAVENOUS at 04:39

## 2018-02-17 RX ADMIN — Medication 4 MG: at 06:11

## 2018-02-17 RX ADMIN — HYDROMORPHONE HYDROCHLORIDE 0.5 MG: 10 INJECTION INTRAMUSCULAR; INTRAVENOUS; SUBCUTANEOUS at 14:59

## 2018-02-17 RX ADMIN — Medication 1 TABLET: at 10:16

## 2018-02-17 RX ADMIN — AMLODIPINE BESYLATE 5 MG: 5 TABLET ORAL at 10:16

## 2018-02-17 RX ADMIN — MIRTAZAPINE 15 MG: 15 TABLET, FILM COATED ORAL at 20:30

## 2018-02-17 NOTE — ED TRIAGE NOTES
Pt to ER via EMS stretcher.  Pt states was walking to bathroom and fell.  C/o rt hip/thigh pain.  Rt leg is shortened and externally rotated.  + dp/pt.

## 2018-02-17 NOTE — CONSULTS
Patient Name: Alona Cisneros  :1935  82 y.o.    Date of Admission: 2018  Date of Consultation:  18  Encounter Provider: Dina Ball RN  Place of Service: [facility]  Referring Provider: Blaire Amin MD  Patient Care Team:  Pierre Ibrahim MD as PCP - General  Pierre Ibrahim MD as PCP - Family Medicine  David Tobin MD as PCP - Claims Attributed      Chief complaint: fall    Consulted for: cardiac clearance    History of Present Illness: Ms. Cisneros is a.    82-year-old female with a history of hypertension who was walking from one room to the other when she subsequently just went down.  She presented emergency room and was found to have a right hip/femur fracture.  Patient said she did not pass out for some reason she just went down.  She's been active until the past several months because of the weather.  She denies any types of chest discomfort shortness of breath.  She said she can promote his to what she wanted prior to this event.  Patient was worked up several years ago for worsening shortness of breath.  She says that that has been minimal.        Cardiac testing:  Echo   Conclusions:  There is normal left ventricular systolic function.  There is a trace of mitral regurgitation.  There is mild tricuspid regurgitation.  There is evidence of borderline pulmonary hypertension.    Stress test   IMPRESSION:   1. Normal Lexiscan Cardiolite stress test.   2. No evidence of infarction or ischemia.   3. Normal left ventricular systolic function.       Past Medical History:   Diagnosis Date   • Anxiety    • Degeneration of lumbar or lumbosacral intervertebral disc 2017   • Hypertension    • Low back pain    • Tremors of nervous system        Past Surgical History:   Procedure Laterality Date   • BACK SURGERY      4 times lspine    • CATARACT EXTRACTION Bilateral    • COLONOSCOPY  2011   • JOINT REPLACEMENT      right hip          Prior to  Admission medications    Medication Sig Start Date End Date Taking? Authorizing Provider   amLODIPine (NORVASC) 5 MG tablet Take 5 mg by mouth daily.   Yes Historical Provider, MD   aspirin 81 MG chewable tablet Chew 81 mg daily.   Yes Historical Provider, MD   Calcium Citrate-Vitamin D (CALCIUM CITRATE + PO) Take  by mouth Daily.   Yes Historical Provider, MD   citalopram (CELEXA) 40 MG tablet Take 1 tablet by mouth daily.  Patient taking differently: Take 20 mg by mouth Daily. 6/20/16  Yes Pierre Ibrahim MD   donepezil (ARICEPT) 5 MG tablet Take 5 mg by mouth Every Night.   Yes Historical Provider, MD   LORazepam (ATIVAN) 0.5 MG tablet Take 0.5 mg by mouth Daily.   Yes Historical Provider, MD   mirtazapine (REMERON) 7.5 MG half tablet Take 15 mg by mouth Every Night.   Yes Historical Provider, MD   Multiple Vitamin (MV-ONE PO) Take 1 tablet by mouth daily.   Yes Historical Provider, MD   primidone (MYSOLINE) 50 MG tablet Take 50 mg by mouth 2 (two) times a day.   Yes Historical Provider, MD   acetaminophen (TYLENOL) 325 MG tablet Take 650 mg by mouth Every 6 (Six) Hours As Needed for Mild Pain (1-3).    Historical Provider, MD   albuterol (VENTOLIN HFA) 108 (90 BASE) MCG/ACT inhaler Inhale 2 puffs Every 4 (Four) Hours As Needed for wheezing. 1/10/17   Pierre Ibrahim MD       No Known Allergies    Social History     Social History   • Marital status:      Spouse name: N/A   • Number of children: N/A   • Years of education: N/A     Social History Main Topics   • Smoking status: Former Smoker     Types: Cigarettes   • Smokeless tobacco: None   • Alcohol use Yes   • Drug use: No   • Sexual activity: Defer     Other Topics Concern   • None     Social History Narrative       History reviewed. No pertinent family history.    REVIEW OF SYSTEMS:   All systems reviewed.  Pertinent positives identified in HPI.  All other systems are negative.      Objective:     Vitals:    02/17/18 0445 02/17/18 0508 02/17/18 0538  02/17/18 0640   BP: 131/83 121/80 119/69 116/63   BP Location:    Right arm   Patient Position:    Lying   Pulse: 70 78 74 81   Resp: 16   16   Temp:    98.3 °F (36.8 °C)   TempSrc:    Oral   SpO2: 95% 93% 95% 94%   Weight:       Height:         Body mass index is 18.08 kg/(m^2).    General Appearance:    Alert, cooperative, in no acute distress   Head:    Normocephalic, without obvious abnormality, atraumatic   Eyes:            Lids and lashes normal, conjunctivae and sclerae normal, no   icterus, no pallor, corneas clear, PERRLA   Ears:    Ears appear intact with no abnormalities noted   Throat:   No oral lesions, no thrush, oral mucosa moist   Neck:   No adenopathy, supple, trachea midline, no thyromegaly, no   carotid bruit, no JVD   Back:     No kyphosis present, no scoliosis present, no skin lesions, erythema or scars, no tenderness to percussion or palpation, range of motion normal   Lungs:     Clear to auscultation,respirations regular, even and unlabored    Heart:    Regular rhythm and normal rate, normal S1 and S2, no murmur, no gallop, no rub, no click   Chest Wall:    No abnormalities observed   Abdomen:     Normal bowel sounds, no masses, no organomegaly, soft        non-tender, non-distended, no guarding, no rebound  tenderness   Extremities:   Moves all extremities well, no edema, no cyanosis, no redness   Pulses:   Pulses palpable and equal bilaterally. Normal radial, carotid, femoral, dorsalis pedis and posterior tibial pulses bilaterally. Normal abdominal aorta   Skin:  Psychiatric:   No bleeding, bruising or rash    Alert and oriented x 3, normal mood and affect   Lab Review:       Results from last 7 days  Lab Units 02/17/18  0433   SODIUM mmol/L 141   POTASSIUM mmol/L 3.6   CHLORIDE mmol/L 102   CO2 mmol/L 27.3   BUN mg/dL 14   CREATININE mg/dL 0.63   CALCIUM mg/dL 8.9   BILIRUBIN mg/dL 0.4   ALK PHOS U/L 69   ALT (SGPT) U/L 20   AST (SGOT) U/L 22   GLUCOSE mg/dL 96           Results from last 7  days  Lab Units 02/17/18  0433   WBC 10*3/mm3 4.38*   HEMOGLOBIN g/dL 13.6   HEMATOCRIT % 42.9   PLATELETS 10*3/mm3 189       Results from last 7 days  Lab Units 02/17/18  0433   INR  1.09   APTT seconds 25.8       EKG: pending                        Assessment and Plan:       1.  Broken femur.  Asked for perioperative risk assessment.  Clinically she should remain a low risk for surgery.  An ECG has not been done yet.  I would like to see the EKG is normal I would proceed with surgery as clinically indicated with no further testing at this time.    Dina Ball RN  02/17/18  10:15 AM      Mor Siegel MD  [facility]  2/17/2018 2:32 PM

## 2018-02-17 NOTE — PLAN OF CARE
Problem: Patient Care Overview (Adult)  Goal: Plan of Care Review  Outcome: Ongoing (interventions implemented as appropriate)   02/17/18 0803 02/17/18 0223   Coping/Psychosocial Response Interventions   Plan Of Care Reviewed With patient;family --    Patient Care Overview   Progress --  no change   Outcome Evaluation   Outcome Summary/Follow up Plan --  Pt admitted to floor with femur fracture. Surgery scheduled for tomorrow. Vitals WNL, RLE in knee immobilizer. Pain controlled with PO and IV meds. Meek catheter to bedside drainage due to acute retention. Will continue to monitor bp r/t history of HTN.     Goal: Adult Individualization and Mutuality  Outcome: Ongoing (interventions implemented as appropriate)    Goal: Discharge Needs Assessment  Outcome: Ongoing (interventions implemented as appropriate)      Problem: Fall Risk (Adult)  Goal: Identify Related Risk Factors and Signs and Symptoms  Outcome: Outcome(s) achieved Date Met: 02/17/18    Goal: Absence of Falls  Outcome: Ongoing (interventions implemented as appropriate)    Goal: Identify Related Risk Factors and Signs and Symptoms  Outcome: Outcome(s) achieved Date Met: 02/17/18    Goal: Absence of Falls  Outcome: Ongoing (interventions implemented as appropriate)      Problem: Orthopaedic Fracture (Adult)  Goal: Signs and Symptoms of Listed Potential Problems Will be Absent or Manageable (Orthopaedic Fracture)  Outcome: Ongoing (interventions implemented as appropriate)      Problem: Pain, Acute (Adult)  Goal: Identify Related Risk Factors and Signs and Symptoms  Outcome: Outcome(s) achieved Date Met: 02/17/18    Goal: Acceptable Pain Control/Comfort Level  Outcome: Ongoing (interventions implemented as appropriate)      Problem: Pressure Ulcer Risk (Yassine Scale) (Adult,Obstetrics,Pediatric)  Goal: Identify Related Risk Factors and Signs and Symptoms  Outcome: Outcome(s) achieved Date Met: 02/17/18    Goal: Skin Integrity  Outcome: Ongoing (interventions  implemented as appropriate)

## 2018-02-17 NOTE — ED PROVIDER NOTES
" EMERGENCY DEPARTMENT ENCOUNTER    CHIEF COMPLAINT  Chief Complaint: Injuries sustained during a fall  History given by: Patient  History limited by: Nothing  Room Number: 14/14  PMD: Pierre Ibrahim MD      HPI:  Pt is a 82 y.o. female who presents complaining of injuries sustained during a fall that occurred earlier this morning. The pt states she got up to use the restroom this morning and she fell. The pt is unsure how she fell, but she remembers the fall. The pt denies hitting her head and LOC. Pt reports right hip pain. The pt's family reports the pt having a hx of a hip replacement on the right.    Duration: Since the fall occurred earlier this morning  Onset: Sudden  Timing: Constant  Location: Right hip  Radiation: None  Quality: \"Pain\"  Intensity/Severity: Moderate  Progression: Unchanged  Associated Symptoms: Right hip pain  Aggravating Factors: Movement  Alleviating Factors: None stated  Previous Episodes: The pt's family reports the pt having a hx of a hip replacement on the right.  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 No pertinent family history.    SOCIAL HISTORY  Social History     Social History   • Marital status:      Spouse name: N/A   • Number of children: N/A   • Years of " education: N/A     Occupational History   • Not on file.     Social History Main Topics   • Smoking status: Former Smoker     Types: Cigarettes   • Smokeless tobacco: Not on file   • Alcohol use Yes   • Drug use: No   • Sexual activity: Defer     Other Topics Concern   • Not on file     Social History Narrative       ALLERGIES  Review of patient's allergies indicates no known allergies.    REVIEW OF SYSTEMS  Review of Systems   Constitutional: Negative for chills and fever.   Respiratory: Negative for cough and shortness of breath.    Cardiovascular: Negative for chest pain and palpitations.   Gastrointestinal: Negative for abdominal pain and vomiting.   Genitourinary: Negative for difficulty urinating and dysuria.   Musculoskeletal: Negative for neck pain.        Right hip pain   Neurological: Negative for syncope and weakness.   All other systems reviewed and are negative.      PHYSICAL EXAM  ED Triage Vitals   Temp Heart Rate Resp BP SpO2   02/17/18 0341 02/17/18 0341 02/17/18 0341 02/17/18 0341 02/17/18 0341   97.9 °F (36.6 °C) 74 14 159/84 97 %      Temp src Heart Rate Source Patient Position BP Location FiO2 (%)   02/17/18 0341 -- -- -- --   Tympanic           Physical Exam   Constitutional: She is oriented to person, place, and time and well-developed, well-nourished, and in no distress. No distress.   HENT:   Head: Normocephalic and atraumatic.   Eyes: EOM are normal. Pupils are equal, round, and reactive to light.   Neck: Normal range of motion. Neck supple.   Cardiovascular: Normal rate, regular rhythm and normal heart sounds.    Pulmonary/Chest: Effort normal and breath sounds normal. No respiratory distress.   Abdominal: Soft. There is no tenderness. There is no rebound and no guarding.   Musculoskeletal: She exhibits no edema.        Right hip: She exhibits tenderness.   The pt's RLE is shortened and externally rotated.   Neurological: She is alert and oriented to person, place, and time. She has normal  sensation and normal strength.   Skin: Skin is warm and dry. No rash noted.   Psychiatric: Mood and affect normal.   Nursing note and vitals reviewed.      LAB RESULTS  Lab Results (last 24 hours)     Procedure Component Value Units Date/Time    CBC & Differential [[id number]] Collected:  02/17/18 0433    Specimen:  Blood Updated:  02/17/18 0458    Narrative:       The following orders were created for panel order CBC & Differential.  Procedure                               Abnormality         Status                     ---------                               -----------         ------                     CBC Auto Differential[[id number]]        Abnormal            Final result                 Please view results for these tests on the individual orders.    Comprehensive Metabolic Panel [[id number]] Collected:  02/17/18 0433    Specimen:  Blood Updated:  02/17/18 0518     Glucose 96 mg/dL      BUN 14 mg/dL      Creatinine 0.63 mg/dL      Sodium 141 mmol/L      Potassium 3.6 mmol/L      Chloride 102 mmol/L      CO2 27.3 mmol/L      Calcium 8.9 mg/dL      Total Protein 6.3 g/dL      Albumin 3.80 g/dL      ALT (SGPT) 20 U/L      AST (SGOT) 22 U/L      Alkaline Phosphatase 69 U/L      Total Bilirubin 0.4 mg/dL      eGFR Non African Amer 91 mL/min/1.73      Globulin 2.5 gm/dL      A/G Ratio 1.5 g/dL      BUN/Creatinine Ratio 22.2     Anion Gap 11.7 mmol/L     Narrative:       The MDRD GFR formula is only valid for adults with stable renal function between ages 18 and 70.    Protime-INR [[id number]]  (Normal) Collected:  02/17/18 0433    Specimen:  Blood Updated:  02/17/18 0509     Protime 13.9 Seconds      INR 1.09    aPTT [[id number]]  (Normal) Collected:  02/17/18 0433    Specimen:  Blood Updated:  02/17/18 0509     PTT 25.8 seconds     CBC Auto Differential [[id number]]  (Abnormal) Collected:  02/17/18 0433    Specimen:  Blood Updated:  02/17/18 0458     WBC 4.38 (L) 10*3/mm3      RBC 4.42 10*6/mm3      Hemoglobin 13.6 g/dL       Hematocrit 42.9 %      MCV 97.1 fL      MCH 30.8 pg      MCHC 31.7 (L) g/dL      RDW 13.9 (H) %      RDW-SD 50.0 fl      MPV 11.6 fL      Platelets 189 10*3/mm3      Neutrophil % 58.8 %      Lymphocyte % 27.9 %      Monocyte % 7.8 %      Eosinophil % 3.9 %      Basophil % 1.1 %      Immature Grans % 0.5 %      Neutrophils, Absolute 2.58 10*3/mm3      Lymphocytes, Absolute 1.22 10*3/mm3      Monocytes, Absolute 0.34 10*3/mm3      Eosinophils, Absolute 0.17 10*3/mm3      Basophils, Absolute 0.05 10*3/mm3      Immature Grans, Absolute 0.02 10*3/mm3     Ethanol [[id number]]  (Abnormal) Collected:  02/17/18 0433    Specimen:  Blood Updated:  02/17/18 0518     Ethanol 11 (H) mg/dL      Ethanol % 0.011 %           I ordered the above labs and reviewed the results    RADIOLOGY  XR Femur 2 View Right   Preliminary Result   Oblique fracture of the mid to distal femur.              XR Chest 1 View   Preliminary Result   No acute findings. No significant change.          XR Hip With or Without Pelvis 2 - 3 View Right   Preliminary Result   Oblique fracture of the mid to distal femoral shaft at the level of the   lower end of the femoral prosthesis.                   I ordered the above noted radiological studies. Interpreted by radiologist. Reviewed by me in PACS.       PROCEDURES  Procedures      PROGRESS AND CONSULTS  ED Course     0353  XR Right Hip and labs ordered for further evaluation. Morphine and Zofran ordered.    0438  Consult placed to A.    0447  Ethanol ordered because the family suspects the pt is drinking more than she is admitting to.    0500  Received a call from Dr. Amin and discussed pt's case. Dr. Amin agreed with plan to admit the pt.    0506  BP- 131/83 HR- 70 Temp- 97.9 °F (36.6 °C) (Tympanic) O2 sat- 95%    Rechecked pt, she is resting comfortably. Discussed the XR and lab results with the pt and family including the femur fx seen on the XR. Discussed the consult with Dr. Amin and the plan to  admit the pt for surgery. The pt and family understand and agree with the plan.    MEDICAL DECISION MAKING  Results were reviewed/discussed with the patient and they were also made aware of online access. Pt also made aware that some labs, such as cultures, will not be resulted during ER visit and follow up with PMD is necessary.     MDM  Number of Diagnoses or Management Options  Closed displaced oblique fracture of shaft of right femur, initial encounter:   Fall from standing, initial encounter:      Amount and/or Complexity of Data Reviewed  Clinical lab tests: ordered and reviewed (WBC - 4.38)  Tests in the radiology section of CPT®: ordered and reviewed (XR Right Hip - Oblique fracture of the mid femoral shaft at the level of the lower end  of the femoral prosthesis.      )  Discuss the patient with other providers: yes (Dr. Amin)    Patient Progress  Patient progress: stable         DIAGNOSIS  Final diagnoses:   Closed displaced oblique fracture of shaft of right femur, initial encounter   Fall from standing, initial encounter       DISPOSITION  ADMISSION    Discussed treatment plan and reason for admission with pt/family and admitting physician.  Pt/family voiced understanding of the plan for admission for further testing/treatment as needed.         Latest Documented Vital Signs:  As of 6:13 AM  BP- 119/69 HR- 74 Temp- 97.9 °F (36.6 °C) (Tympanic) O2 sat- 95%    --  Documentation assistance provided by vicenta Antonio for Dr. Antonio.  Information recorded by the vicenta was done at my direction and has been verified and validated by me.     Clint Antonio  02/17/18 0511       Wilber Antonio MD  02/17/18 0691

## 2018-02-17 NOTE — H&P
HISTORY AND PHYSICAL   [facility]        Patient Identification:  Name: Alona Cisneros  Age: 82 y.o.  Sex: female  :  1935  MRN: [id number]                     Primary Care Physician: Pierre Ibrahim MD    Chief Complaint:  Fall     History of Present Illness:        The patient is an 82-year-old white female with history of anxiety, degenerative disc disease of the lumbar spine, hypertension, chronic back pain and history of tremors who is admitted with history of having fallen down when she got up to go to the bathroom.  She doesn't think she lost consciousness and is not sure why she fell down.  She denies having any chest pain or shortness of air.  She's not had any nausea vomiting.  She denies having any cold or flu symptoms.  The patient was taken to the emergency room and found to have right femur fracture.  The patient was admitted for further evaluation treatment of this.    Past Medical History:  Past Medical History:   Diagnosis Date   • Anxiety    • Degeneration of lumbar or lumbosacral intervertebral disc 2017   • Hypertension    • Low back pain    • Tremors of nervous system      Past Surgical History:  Past Surgical History:   Procedure Laterality Date   • BACK SURGERY      4 times lspine    • CATARACT EXTRACTION Bilateral    • COLONOSCOPY  2011   • JOINT REPLACEMENT      right hip       Home Meds:  Prescriptions Prior to Admission   Medication Sig Dispense Refill Last Dose   • amLODIPine (NORVASC) 5 MG tablet Take 5 mg by mouth daily.   Taking   • aspirin 81 MG chewable tablet Chew 81 mg daily.   Taking   • Calcium Citrate-Vitamin D (CALCIUM CITRATE + PO) Take  by mouth Daily.      • citalopram (CELEXA) 40 MG tablet Take 1 tablet by mouth daily. (Patient taking differently: Take 20 mg by mouth Daily.) 90 tablet 3 Taking   • donepezil (ARICEPT) 5 MG tablet Take 5 mg by mouth Every Night.      • LORazepam (ATIVAN) 0.5 MG tablet Take 0.5 mg by mouth Daily.      •  mirtazapine (REMERON) 7.5 MG half tablet Take 15 mg by mouth Every Night.      • Multiple Vitamin (MV-ONE PO) Take 1 tablet by mouth daily.   Taking   • primidone (MYSOLINE) 50 MG tablet Take 50 mg by mouth 2 (two) times a day.   Taking   • acetaminophen (TYLENOL) 325 MG tablet Take 650 mg by mouth Every 6 (Six) Hours As Needed for Mild Pain (1-3).      • albuterol (VENTOLIN HFA) 108 (90 BASE) MCG/ACT inhaler Inhale 2 puffs Every 4 (Four) Hours As Needed for wheezing. 1 inhaler 11 Taking       Allergies:  No Known Allergies  Immunizations:  Immunization History   Administered Date(s) Administered   • Influenza, Unspecified 09/16/2016   • Pneumococcal, Unspecified 11/12/2010   • Zoster 07/08/2008     Social History:   Social History     Social History Narrative     Social History     Social History   • Marital status:      Spouse name: N/A   • Number of children: N/A   • Years of education: N/A     Occupational History   • Not on file.     Social History Main Topics   • Smoking status: Former Smoker     Types: Cigarettes   • Smokeless tobacco: Not on file   • Alcohol use Yes   • Drug use: No   • Sexual activity: Defer     Other Topics Concern   • Not on file     Social History Narrative       Family History:  Family History   Problem Relation Age of Onset   • COPD Father         Review of Systems  See history of present illness and past medical history.  Patient denies headache, dizziness, syncope, falls, trauma, change in vision, change in hearing, change in taste, changes in weight, changes in appetite, focal weakness, numbness, or paresthesia.  Patient denies chest pain, palpitations, dyspnea, orthopnea, PND, cough, sinus pressure, rhinorrhea, epistaxis, hemoptysis, nausea, vomiting,hematemesis, diarrhea, constipation or hematchezia.  Denies cold or heat intolerance, polydipsia, polyuria, polyphagia. Denies hematuria, pyuria, dysuria, hesitancy, frequency or urgency. Denies consumption of raw and under  "cooked meats foods or change in water source.  Denies fever, chills, sweats, night sweats.  Denies missing any routine medications. Remainder of ROS is negative.    Objective:  tMax 24 hrs: Temp (24hrs), Av.1 °F (36.7 °C), Min:97.9 °F (36.6 °C), Max:98.3 °F (36.8 °C)    Vitals Ranges:   Temp:  [97.9 °F (36.6 °C)-98.3 °F (36.8 °C)] 98.3 °F (36.8 °C)  Heart Rate:  [70-81] 81  Resp:  [14-16] 16  BP: (116-159)/(63-84) 116/63      Exam:  /63 (BP Location: Right arm, Patient Position: Lying)  Pulse 81  Temp 98.3 °F (36.8 °C) (Oral)   Resp 16  Ht 177.8 cm (70\")  Wt 57.2 kg (126 lb)  SpO2 94%  BMI 18.08 kg/m2    General Appearance:    Alert, cooperative, no distress, appears stated age   Head:    Normocephalic, without obvious abnormality, atraumatic   Eyes:    PERRL, conjunctiva/corneas clear, EOM's intact, both eyes   Ears:    Normal external ear canals, both ears   Nose:   Nares normal, septum midline, mucosa normal, no drainage    or sinus tenderness   Throat:   Lips, mucosa, and tongue normal   Neck:   Supple, symmetrical, trachea midline, no adenopathy;     thyroid:  no enlargement/tenderness/nodules; no carotid    bruit or JVD   Back:     Symmetric, no curvature, ROM normal, no CVA tenderness   Lungs:     Clear to auscultation bilaterally, respirations unlabored   Chest Wall:    No tenderness or deformity    Heart:    Regular rate and rhythm, S1 and S2 normal, no murmur, rub   or gallop   Abdomen:     Soft, non-tender, bowel sounds active all four quadrants,     no masses, no hepatomegaly, no splenomegaly   Extremities:   Extremities normal, right leg injury With some ecchymosis over right thigh, no cyanosis or edema   Pulses:   2+ and symmetric all extremities   Skin:   Skin color, texture, turgor normal, no rashes or lesions   Lymph nodes:   Cervical, supraclavicular, and axillary nodes normal   Neurologic:   CNII-XII intact, normal strength, sensation intact throughout      .    Data Review:  Lab " Results (last 72 hours)     Procedure Component Value Units Date/Time    CBC Auto Differential [[id number]]  (Abnormal) Collected:  02/17/18 0433    Specimen:  Blood Updated:  02/17/18 0458     WBC 4.38 (L) 10*3/mm3      RBC 4.42 10*6/mm3      Hemoglobin 13.6 g/dL      Hematocrit 42.9 %      MCV 97.1 fL      MCH 30.8 pg      MCHC 31.7 (L) g/dL      RDW 13.9 (H) %      RDW-SD 50.0 fl      MPV 11.6 fL      Platelets 189 10*3/mm3      Neutrophil % 58.8 %      Lymphocyte % 27.9 %      Monocyte % 7.8 %      Eosinophil % 3.9 %      Basophil % 1.1 %      Immature Grans % 0.5 %      Neutrophils, Absolute 2.58 10*3/mm3      Lymphocytes, Absolute 1.22 10*3/mm3      Monocytes, Absolute 0.34 10*3/mm3      Eosinophils, Absolute 0.17 10*3/mm3      Basophils, Absolute 0.05 10*3/mm3      Immature Grans, Absolute 0.02 10*3/mm3     CBC & Differential [[id number]] Collected:  02/17/18 0433    Specimen:  Blood Updated:  02/17/18 0458    Narrative:       The following orders were created for panel order CBC & Differential.  Procedure                               Abnormality         Status                     ---------                               -----------         ------                     CBC Auto Differential[[id number]]        Abnormal            Final result                 Please view results for these tests on the individual orders.    Protime-INR [[id number]]  (Normal) Collected:  02/17/18 0433    Specimen:  Blood Updated:  02/17/18 0509     Protime 13.9 Seconds      INR 1.09    aPTT [[id number]]  (Normal) Collected:  02/17/18 0433    Specimen:  Blood Updated:  02/17/18 0509     PTT 25.8 seconds     Comprehensive Metabolic Panel [[id number]] Collected:  02/17/18 0433    Specimen:  Blood Updated:  02/17/18 0518     Glucose 96 mg/dL      BUN 14 mg/dL      Creatinine 0.63 mg/dL      Sodium 141 mmol/L      Potassium 3.6 mmol/L      Chloride 102 mmol/L      CO2 27.3 mmol/L      Calcium 8.9 mg/dL      Total Protein 6.3 g/dL      Albumin  3.80 g/dL      ALT (SGPT) 20 U/L      AST (SGOT) 22 U/L      Alkaline Phosphatase 69 U/L      Total Bilirubin 0.4 mg/dL      eGFR Non African Amer 91 mL/min/1.73      Globulin 2.5 gm/dL      A/G Ratio 1.5 g/dL      BUN/Creatinine Ratio 22.2     Anion Gap 11.7 mmol/L     Narrative:       The MDRD GFR formula is only valid for adults with stable renal function between ages 18 and 70.    Ethanol [[id number]]  (Abnormal) Collected:  02/17/18 0433    Specimen:  Blood Updated:  02/17/18 0518     Ethanol 11 (H) mg/dL      Ethanol % 0.011 %                    Imaging Results (all)     Procedure Component Value Units Date/Time    XR Hip With or Without Pelvis 2 - 3 View Right [[id number]] Collected:  02/17/18 0439     Updated:  02/17/18 0539    Narrative:       2 VIEWS OF THE RIGHT HIP, SINGLE VIEW OF THE PELVIS.     HISTORY: Trauma.     COMPARISON: 08/12/2015.     FINDINGS:  Patient is status post right total hip replacement. An oblique fracture  of the femoral shaft is seen at the level of the lower end of the  femoral prosthesis.         Soft tissue swelling at the fracture site.       Impression:       Oblique fracture of the mid to distal femoral shaft at the level of the  lower end of the femoral prosthesis.           XR Femur 2 View Right [[id number]] Collected:  02/17/18 0503     Updated:  02/17/18 0543    Narrative:       X-RAY FEMUR 2 VIEWS.     HISTORY: Trauma.     COMPARISON: No prior studies for comparison.     FINDINGS:  Oblique fracture of the humeral shaft, at the level of the tip of the  stem of the femoral prosthesis.         Local soft tissue swelling.       Impression:       Oblique fracture of the mid to distal femur.           XR Chest 1 View [[id number]] Collected:  02/17/18 0520     Updated:  02/17/18 0543    Narrative:       X-RAY CHEST 1 VIEW.     HISTORY: Preop.     COMPARISON: 07/30/2015.     FINDINGS:  Cardiomediastinal silhouette is within normal limits.         There is no consolidation or  effusion. Moderate emphysema.          Impression:       No acute findings. No significant change.           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   • Closed displaced oblique fracture of shaft of right femur S72.331A   • Fall from standing W19.XXXA   • Anxiety F41.9   • Hypertension I10   • Tremors of nervous system R25.1       Assessment:  Active Hospital Problems (** Indicates Principal Problem)    Diagnosis Date Noted   • **Closed displaced oblique fracture of shaft of right femur [S72.331A] 02/17/2018   • Fall from standing [W19.XXXA] 02/17/2018   • Anxiety [F41.9] 02/17/2018   • Hypertension [I10] 02/17/2018   • Tremors of nervous system [R25.1] 02/17/2018   • Degeneration of lumbar or lumbosacral intervertebral disc [M51.37] 06/01/2017      Resolved Hospital Problems    Diagnosis Date Noted Date Resolved   No resolved problems to display.       Plan:  The patient's admitted the hospital consult orthopedic surgery.  Also cardiology's been consulted and they've cleared her for surgery if her EKG is normal and it was normal sinus rhythm.  I think she appears stable for surgery as planned for tomorrow.    Bobo Finley MD  2/17/2018  11:35 AM

## 2018-02-17 NOTE — CONSULTS
Orthopedic Consult      Patient: Alona Cisneros    Date of Admission: 2/17/2018  3:43 AM    YOB: 1935    Medical Record Number: [id number]    Attending Physician: Bobo Finley MD  Consulting Physician:  Nima Rausch M.D.      Chief Complaints: Closed displaced oblique fracture of shaft of right femur, initial encounter [S72.331A]  Fall from standing, initial encounter [W19.XXXA]      History of Present Illness: 82 y.o. female admitted to [facility] to services of Bobo Finley MD with Closed displaced oblique fracture of shaft of right femur, initial encounter [S72.331A]  Fall from standing, initial encounter [W19.XXXA].  was consulted for further evaluation and treatment. Onset of symptoms was abrupt starting a few hours ago.  Symptoms are associated with acute pain with deformity of the distal femur.  A complete inability to stand or ambulate.  Symptoms are aggravated by any type of movement..   Symptoms improve with strict rest and immobility.    Patient has a history of prior injury to the proximal femur.  She was injured in 2014 and sustained an intertrochanteric hip fracture.  This was treated by Dr. Luke Teran with what appears to be an intramedullary device.  This device failed and she continued to have a lot of pain and discomfort.  The patient was then treated surgically by Dr. Jhonathan Sidhu in 2015.  She underwent a cemented, hybrid total hip arthroplasty with a long stem.  The patient has been complaining of pain in that femur for several months.  In fact, she was last seen by her treating surgeon in October 2017 with complaints of femoral shaft pain.  Apparently she was told that everything was in good order and she needed conservative care.  The patient has been using a heating pad to help mitigate some of the pain and discomfort.  She lives in an assisted care living place.  She does use assistive devices such as canes and a walker for ambulation.  She was getting up and going  to the bathroom at 2:30 AM Saturday morning when she stumbled fell and sustained a displaced right femoral fracture.  Patient was seen in the emergency room and diagnosed with a periprosthetic distal femoral fracture.  We were then consult of orthopedic care.    It is important to note that the patient has a history of advancing dementia and her recollection of recent events is somewhat hazy.  The patient's daughters are present in the room during my history and physical examination for this examination process.  A lot of the patient's history is given to me by her daughters.  They were also pointed out to me that this patient consumes alcohol regularly on a daily basis.  She apparently consumes 5-6 ounces of hard liquor every single day.  This EtOH consumption has been going on for several years.     Allergies: No Known Allergies    Medications:   Home Medications:  No current facility-administered medications on file prior to encounter.      Current Outpatient Prescriptions on File Prior to Encounter   Medication Sig   • amLODIPine (NORVASC) 5 MG tablet Take 5 mg by mouth daily.   • aspirin 81 MG chewable tablet Chew 81 mg daily.   • Calcium Citrate-Vitamin D (CALCIUM CITRATE + PO) Take  by mouth Daily.   • citalopram (CELEXA) 40 MG tablet Take 1 tablet by mouth daily. (Patient taking differently: Take 20 mg by mouth Daily.)   • donepezil (ARICEPT) 5 MG tablet Take 5 mg by mouth Every Night.   • LORazepam (ATIVAN) 0.5 MG tablet Take 0.5 mg by mouth Daily.   • mirtazapine (REMERON) 7.5 MG half tablet Take 15 mg by mouth Every Night.   • Multiple Vitamin (MV-ONE PO) Take 1 tablet by mouth daily.   • primidone (MYSOLINE) 50 MG tablet Take 50 mg by mouth 2 (two) times a day.   • acetaminophen (TYLENOL) 325 MG tablet Take 650 mg by mouth Every 6 (Six) Hours As Needed for Mild Pain (1-3).   • albuterol (VENTOLIN HFA) 108 (90 BASE) MCG/ACT inhaler Inhale 2 puffs Every 4 (Four) Hours As Needed for wheezing.     Current  Medications:  Scheduled Meds:  amLODIPine 5 mg Oral Daily   calcium-vitamin D 500 mg Oral BID   citalopram 20 mg Oral Daily   donepezil 5 mg Oral Nightly   LORazepam 0.5 mg Oral Daily   mirtazapine 15 mg Oral Nightly   IV Fluids 1000 mL + additives 100 mL/hr Intravenous Daily   multivitamin 1 tablet Oral Daily   primidone 50 mg Oral Nightly     Continuous Infusions:  sodium chloride 125 mL/hr Last Rate: 125 mL/hr (02/17/18 0432)     PRN Meds:.Morphine  •  ondansetron  •  Insert peripheral IV **AND** sodium chloride    Past Medical History:   Diagnosis Date   • Anxiety    • Degeneration of lumbar or lumbosacral intervertebral disc 6/1/2017   • Hypertension    • Low back pain    • Tremors of nervous system         Past Surgical History:   Procedure Laterality Date   • BACK SURGERY      4 times lspine    • CATARACT EXTRACTION Bilateral    • COLONOSCOPY  12/28/2011   • JOINT REPLACEMENT      right hip         Social History     Occupational History   • Not on file.     Social History Main Topics   • Smoking status: Former Smoker     Types: Cigarettes   • Smokeless tobacco: Not on file   • Alcohol use Yes   • Drug use: No   • Sexual activity: Defer    Social History     Social History Narrative      History reviewed. No pertinent family history.      Review of Systems:   HEENT: Patient denies any headaches, vision changes, change in hearing, or tinnitus, Patient denies any rhinorrhea,epistaxis, sinus pain, mouth or dental problems, sore throat or hoarseness, or dysphagia  Pulmonary: Patient denies any cough, congestion, SOA, or wheezing  Cardiovascular: Patient denies any chest pain, dyspnea, palpitations, weakness, intolerance of exercise, varicosities, swelling of extremities, known murmur  Gastrointestinal:  Patient denies nausea, vomiting, diarrhea, constipation, loss  of appetite, change in appetite, dysphagia, gas, heartburn, melena, change in bowel habits, use of laxatives or other drugs to alter the function of  the gastrointestinal tract.  Genital/Urinary: Patient denies dysuria, change in color of urine, change in frequency of urination, pain with urgency, incontinence, retention, or nocturia.  Musculoskeletal: Patient denies increased warmth; redness; or swelling of joints; limitation of function; deformity; crepitation: pain in a joint or an extremity, the neck, or the back, especially with movement.  Neurological: Patient denies dizziness, tremor, ataxia, difficulty in speaking, change in speech, paresthesia, loss of sensation, seizures, syncope, changes in memory.  Endocrine system: Patient denies tremors, palpitations, intolerance of heat or cold, polyuria, polydipsia, polyphagia, diaphoresis, exophthalmos, or goiter.  Psychological: Patient denies thoughts/plans or harming self or other; depression,  insomnia, night terrors, daphne, memory loss, disorientation.  Skin: Patient denies any bruising, rashes, discoloration, pruritus, wounds, ulcers, decubiti, changes in the hair or nails  Hematopoietic: Patient denies history of spontaneous or excessive bleeding, epistaxis, hematuria, melena, fatigue, enlarged or tender lymph nodes, pallor, history of anemia.    Physical Exam: 82 y.o. female  General Appearance:    Alert, cooperative, in no acute distress                 Vitals:    02/17/18 0445 02/17/18 0508 02/17/18 0538 02/17/18 0640   BP: 131/83 121/80 119/69 116/63   BP Location:    Right arm   Patient Position:    Lying   Pulse: 70 78 74 81   Resp: 16   16   Temp:    98.3 °F (36.8 °C)   TempSrc:    Oral   SpO2: 95% 93% 95% 94%   Weight:       Height:            Head:    Normocephalic, without obvious abnormality, atraumatic   Eyes:            Lids and lashes normal, conjunctivae and sclerae normal, no   icterus, no pallor, corneas clear, PERRLA   Ears:    Ears appear intact with no abnormalities noted   Throat:   No oral lesions, no thrush, oral mucosa moist   Neck:   No adenopathy, supple, trachea midline, no  thyromegaly, no   carotid bruit, no JVD   Back:     No kyphosis present, no scoliosis present, no skin lesions,      erythema or scars, no tenderness to percussion or                   palpation,   range of motion normal   Lungs:     Clear to auscultation,respirations regular, even and                  unlabored    Heart:    Regular rhythm and normal rate, normal S1 and S2, no            murmur, no gallop, no rub, no click   Chest Wall:    No abnormalities observed   Abdomen:     Normal bowel sounds, no masses, no organomegaly, soft        non-tender, non-distended, no guarding, no rebound                tenderness   Rectal:     Deferred   Extremities:   Tenderness noted at Over the mid diaphysis of the femur..  Moves all extremities well, no       edema, no cyanosis, no redness   Pulses:   Pulses palpable and equal bilaterally   Skin:   No bleeding, bruising or rash   Lymph nodes:   No palpable adenopathy   Neurologic:   Cranial nerves 2 - 12 grossly intact, sensation intact, DTR       present and equal bilaterally      right Hip. Skin and soft tissues are swollen. Proximal fragment is flexed, abducted and externally rotated. There is severe pain at the fracture sit. Shortening and external rotation deformities are very marked. Neurovascular status is intact. Skin and soft tissue envelope is markedly swollen and bruised. Gait cannot be checked. Patient cannot perform active or passive straight leg raise exam. There is no evidence of a compartmental syndrome. There is very distinct abnormal mobility at the fracture site with crepitus.     Diagnostic Tests:  [unfilled]      [unfilled]    Assessment:  Patient Active Problem List   Diagnosis   • Degeneration of lumbar or lumbosacral intervertebral disc   • Lumbar neuritis   • Closed displaced oblique fracture of shaft of right femur           Plan:   Admit the patient to the hospitalist.    IV lactated Ringer's at 75 cc an hour.    Place a knee immobilizing brace to  the right femur to stabilize the fracture fragments.    Neurovascular checks on the right lower extremity every shift.    Decubitus precaution.    It is okay for her to have a regular diet this morning.    Nothing by mouth after midnight in preparation for surgical intervention for Jean morning.    Labs will include CBC, Chem-7, PT PTT, x-ray chest and an EKG.    The patient is being seen by the hospitalist and will need to be cleared medically for surgical intervention to stabilize the femoral diaphyseal fracture.    I would recommend obtaining a cardiology consultation as well because she has had cardiac issues in the past.    Type and screen 2 units of packed red blood cells for ossicle transfusion.  Are postoperatively.    Injection morphine 1 mg IV every 2-4 hours when necessary pain and discomfort.    Injections Zofran 4 mg IV every 6 when necessary nausea.   The patient voiced understanding of the risks, benefits, and alternative forms of treatment that were discussed and the patient consents to proceed with Operative stabilization of the right femoral diaphyseal fracture along with possible bone grafting.     The patient was seen today for preoperative discussion.  The patient's 2 daughters were also present in the room and asked me several questions with regards to this proposed procedure and the potential outcome including the risks benefits and potential complications of a procedure such as this internal fixation.      The details of the surgical procedure were explained including the location of probable incisions and a description of the likely hardware/grafts to be used. The patient understands the likely convalescence after surgery as well as the rehabilitation required.  Also, we have thoroughly discussed with the patient the risks, benefits and alternatives to surgery.  Risks include but are not limited to the risk of death, infection, joint stiffness, limited range of motion, wound healing  problems, scar tissue build up, myocardial infarction, stroke, blood clots (including DVT and/or pulmonary embolus along with the risk of death) neurologic and/or vascular injury, limb length discrepancy, fracture, dislocation, nonunion, malunion, continued pain and need for further surgery including hardware failure requiring revision.   Discharge Plan: Unknown at this time. to rehab      Date: 2/17/2018    Nima Rausch MD      Time: Critical care 35 min

## 2018-02-18 ENCOUNTER — APPOINTMENT (OUTPATIENT)
Dept: GENERAL RADIOLOGY | Facility: HOSPITAL | Age: 83
End: 2018-02-18

## 2018-02-18 ENCOUNTER — ANESTHESIA EVENT (OUTPATIENT)
Dept: PERIOP | Facility: HOSPITAL | Age: 83
End: 2018-02-18

## 2018-02-18 ENCOUNTER — ANESTHESIA (OUTPATIENT)
Dept: PERIOP | Facility: HOSPITAL | Age: 83
End: 2018-02-18

## 2018-02-18 LAB
ANION GAP SERPL CALCULATED.3IONS-SCNC: 7.9 MMOL/L
BASOPHILS # BLD AUTO: 0.03 10*3/MM3 (ref 0–0.2)
BASOPHILS NFR BLD AUTO: 0.6 % (ref 0–1.5)
BUN BLD-MCNC: 21 MG/DL (ref 8–23)
BUN/CREAT SERPL: 33.3 (ref 7–25)
CALCIUM SPEC-SCNC: 7.9 MG/DL (ref 8.6–10.5)
CHLORIDE SERPL-SCNC: 103 MMOL/L (ref 98–107)
CO2 SERPL-SCNC: 27.1 MMOL/L (ref 22–29)
CREAT BLD-MCNC: 0.63 MG/DL (ref 0.57–1)
DEPRECATED RDW RBC AUTO: 53.1 FL (ref 37–54)
EOSINOPHIL # BLD AUTO: 0.18 10*3/MM3 (ref 0–0.7)
EOSINOPHIL NFR BLD AUTO: 3.8 % (ref 0.3–6.2)
ERYTHROCYTE [DISTWIDTH] IN BLOOD BY AUTOMATED COUNT: 14.5 % (ref 11.7–13)
GFR SERPL CREATININE-BSD FRML MDRD: 91 ML/MIN/1.73
GLUCOSE BLD-MCNC: 108 MG/DL (ref 65–99)
HCT VFR BLD AUTO: 35.4 % (ref 35.6–45.5)
HCT VFR BLD AUTO: 36.9 % (ref 35.6–45.5)
HGB BLD-MCNC: 11.1 G/DL (ref 11.9–15.5)
HGB BLD-MCNC: 11.4 G/DL (ref 11.9–15.5)
IMM GRANULOCYTES # BLD: 0 10*3/MM3 (ref 0–0.03)
IMM GRANULOCYTES NFR BLD: 0 % (ref 0–0.5)
INR PPP: 1.14 (ref 0.9–1.1)
LYMPHOCYTES # BLD AUTO: 1.16 10*3/MM3 (ref 0.9–4.8)
LYMPHOCYTES NFR BLD AUTO: 24.3 % (ref 19.6–45.3)
MCH RBC QN AUTO: 30.7 PG (ref 26.9–32)
MCHC RBC AUTO-ENTMCNC: 30.9 G/DL (ref 32.4–36.3)
MCV RBC AUTO: 99.5 FL (ref 80.5–98.2)
MONOCYTES # BLD AUTO: 0.65 10*3/MM3 (ref 0.2–1.2)
MONOCYTES NFR BLD AUTO: 13.6 % (ref 5–12)
NEUTROPHILS # BLD AUTO: 2.75 10*3/MM3 (ref 1.9–8.1)
NEUTROPHILS NFR BLD AUTO: 57.7 % (ref 42.7–76)
PLATELET # BLD AUTO: 143 10*3/MM3 (ref 140–500)
PMV BLD AUTO: 11.6 FL (ref 6–12)
POTASSIUM BLD-SCNC: 3.8 MMOL/L (ref 3.5–5.2)
PROTHROMBIN TIME: 14.4 SECONDS (ref 11.7–14.2)
RBC # BLD AUTO: 3.71 10*6/MM3 (ref 3.9–5.2)
SODIUM BLD-SCNC: 138 MMOL/L (ref 136–145)
WBC NRBC COR # BLD: 4.77 10*3/MM3 (ref 4.5–10.7)

## 2018-02-18 PROCEDURE — C1713 ANCHOR/SCREW BN/BN,TIS/BN: HCPCS | Performed by: ORTHOPAEDIC SURGERY

## 2018-02-18 PROCEDURE — 25010000002 DEXAMETHASONE PER 1 MG: Performed by: NURSE ANESTHETIST, CERTIFIED REGISTERED

## 2018-02-18 PROCEDURE — 25010000002 ONDANSETRON PER 1 MG: Performed by: NURSE ANESTHETIST, CERTIFIED REGISTERED

## 2018-02-18 PROCEDURE — 85025 COMPLETE CBC W/AUTO DIFF WBC: CPT | Performed by: HOSPITALIST

## 2018-02-18 PROCEDURE — 85018 HEMOGLOBIN: CPT | Performed by: ORTHOPAEDIC SURGERY

## 2018-02-18 PROCEDURE — 85610 PROTHROMBIN TIME: CPT | Performed by: ORTHOPAEDIC SURGERY

## 2018-02-18 PROCEDURE — 25010000002 MIDAZOLAM PER 1 MG: Performed by: ANESTHESIOLOGY

## 2018-02-18 PROCEDURE — 25010000002 ROPIVACAINE PER 1 MG: Performed by: ORTHOPAEDIC SURGERY

## 2018-02-18 PROCEDURE — 72170 X-RAY EXAM OF PELVIS: CPT

## 2018-02-18 PROCEDURE — 25010000002 FENTANYL PER 0.1 MG

## 2018-02-18 PROCEDURE — 73551 X-RAY EXAM OF FEMUR 1: CPT

## 2018-02-18 PROCEDURE — 76000 FLUOROSCOPY <1 HR PHYS/QHP: CPT

## 2018-02-18 PROCEDURE — 94799 UNLISTED PULMONARY SVC/PX: CPT

## 2018-02-18 PROCEDURE — 25010000003 CEFAZOLIN IN DEXTROSE 2-4 GM/100ML-% SOLUTION: Performed by: ORTHOPAEDIC SURGERY

## 2018-02-18 PROCEDURE — 0QU80JZ SUPPLEMENT RIGHT FEMORAL SHAFT WITH SYNTHETIC SUBSTITUTE, OPEN APPROACH: ICD-10-PCS | Performed by: ORTHOPAEDIC SURGERY

## 2018-02-18 PROCEDURE — 0QS804Z REPOSITION RIGHT FEMORAL SHAFT WITH INTERNAL FIXATION DEVICE, OPEN APPROACH: ICD-10-PCS | Performed by: ORTHOPAEDIC SURGERY

## 2018-02-18 PROCEDURE — 99231 SBSQ HOSP IP/OBS SF/LOW 25: CPT | Performed by: INTERNAL MEDICINE

## 2018-02-18 PROCEDURE — 85014 HEMATOCRIT: CPT | Performed by: ORTHOPAEDIC SURGERY

## 2018-02-18 PROCEDURE — 25010000003 CEFAZOLIN IN DEXTROSE 2-4 GM/100ML-% SOLUTION: Performed by: NURSE ANESTHETIST, CERTIFIED REGISTERED

## 2018-02-18 PROCEDURE — 25010000002 PROPOFOL 10 MG/ML EMULSION: Performed by: NURSE ANESTHETIST, CERTIFIED REGISTERED

## 2018-02-18 PROCEDURE — 80048 BASIC METABOLIC PNL TOTAL CA: CPT | Performed by: HOSPITALIST

## 2018-02-18 PROCEDURE — 73552 X-RAY EXAM OF FEMUR 2/>: CPT

## 2018-02-18 PROCEDURE — 25010000002 HYDROMORPHONE PER 4 MG: Performed by: NURSE ANESTHETIST, CERTIFIED REGISTERED

## 2018-02-18 PROCEDURE — 25010000002 FENTANYL CITRATE (PF) 100 MCG/2ML SOLUTION: Performed by: NURSE ANESTHETIST, CERTIFIED REGISTERED

## 2018-02-18 DEVICE — CABL CERCLG GTR COCR 1.8MM 63.5CM: Type: IMPLANTABLE DEVICE | Site: FEMUR | Status: FUNCTIONAL

## 2018-02-18 DEVICE — IMPLANTABLE DEVICE: Type: IMPLANTABLE DEVICE | Site: FEMUR | Status: FUNCTIONAL

## 2018-02-18 DEVICE — NCB CURVED FEMUR SHAFT PLATE, 12H, 249MM
Type: IMPLANTABLE DEVICE | Site: FEMUR | Status: FUNCTIONAL
Brand: NCB®

## 2018-02-18 DEVICE — NCB SCREW 5.0   L = 46
Type: IMPLANTABLE DEVICE | Site: FEMUR | Status: FUNCTIONAL
Brand: NCB®

## 2018-02-18 DEVICE — NCB SCREW 5.0   L = 60
Type: IMPLANTABLE DEVICE | Site: FEMUR | Status: FUNCTIONAL
Brand: NCB®

## 2018-02-18 DEVICE — BONE FILLER VOID CERAMENT 10CC: Type: IMPLANTABLE DEVICE | Site: FEMUR | Status: FUNCTIONAL

## 2018-02-18 DEVICE — NCB SCREW 5.0   L = 42
Type: IMPLANTABLE DEVICE | Status: FUNCTIONAL
Brand: NCB®

## 2018-02-18 DEVICE — NCB CLAMP-SCREW
Type: IMPLANTABLE DEVICE | Site: FEMUR | Status: FUNCTIONAL
Brand: NCB®

## 2018-02-18 DEVICE — NCB SCREW 5.0   L = 55
Type: IMPLANTABLE DEVICE | Site: FEMUR | Status: FUNCTIONAL
Brand: NCB®

## 2018-02-18 RX ORDER — ONDANSETRON 4 MG/1
4 TABLET, ORALLY DISINTEGRATING ORAL EVERY 6 HOURS PRN
Status: DISCONTINUED | OUTPATIENT
Start: 2018-02-18 | End: 2018-02-21 | Stop reason: HOSPADM

## 2018-02-18 RX ORDER — EPHEDRINE SULFATE 50 MG/ML
5 INJECTION, SOLUTION INTRAVENOUS ONCE AS NEEDED
Status: DISCONTINUED | OUTPATIENT
Start: 2018-02-18 | End: 2018-02-18 | Stop reason: HOSPADM

## 2018-02-18 RX ORDER — FLUMAZENIL 0.1 MG/ML
0.2 INJECTION INTRAVENOUS AS NEEDED
Status: DISCONTINUED | OUTPATIENT
Start: 2018-02-18 | End: 2018-02-18 | Stop reason: HOSPADM

## 2018-02-18 RX ORDER — OXYCODONE HYDROCHLORIDE AND ACETAMINOPHEN 5; 325 MG/1; MG/1
1 TABLET ORAL EVERY 4 HOURS PRN
Status: DISCONTINUED | OUTPATIENT
Start: 2018-02-18 | End: 2018-02-19

## 2018-02-18 RX ORDER — ROPIVACAINE HYDROCHLORIDE 5 MG/ML
INJECTION, SOLUTION EPIDURAL; INFILTRATION; PERINEURAL AS NEEDED
Status: DISCONTINUED | OUTPATIENT
Start: 2018-02-18 | End: 2018-02-18 | Stop reason: HOSPADM

## 2018-02-18 RX ORDER — ACETAMINOPHEN 325 MG/1
325 TABLET ORAL EVERY 4 HOURS PRN
Status: DISCONTINUED | OUTPATIENT
Start: 2018-02-18 | End: 2018-02-21 | Stop reason: HOSPADM

## 2018-02-18 RX ORDER — LIDOCAINE HYDROCHLORIDE 40 MG/ML
SOLUTION TOPICAL
Status: COMPLETED
Start: 2018-02-18 | End: 2018-02-18

## 2018-02-18 RX ORDER — HYDROMORPHONE HCL 110MG/55ML
0.5 PATIENT CONTROLLED ANALGESIA SYRINGE INTRAVENOUS
Status: DISCONTINUED | OUTPATIENT
Start: 2018-02-18 | End: 2018-02-18 | Stop reason: HOSPADM

## 2018-02-18 RX ORDER — ONDANSETRON 4 MG/1
4 TABLET, FILM COATED ORAL EVERY 6 HOURS PRN
Status: DISCONTINUED | OUTPATIENT
Start: 2018-02-18 | End: 2018-02-21 | Stop reason: HOSPADM

## 2018-02-18 RX ORDER — FENTANYL CITRATE 50 UG/ML
50 INJECTION, SOLUTION INTRAMUSCULAR; INTRAVENOUS
Status: DISCONTINUED | OUTPATIENT
Start: 2018-02-18 | End: 2018-02-18 | Stop reason: HOSPADM

## 2018-02-18 RX ORDER — PROMETHAZINE HYDROCHLORIDE 25 MG/1
25 TABLET ORAL ONCE AS NEEDED
Status: DISCONTINUED | OUTPATIENT
Start: 2018-02-18 | End: 2018-02-18 | Stop reason: HOSPADM

## 2018-02-18 RX ORDER — PROPOFOL 10 MG/ML
VIAL (ML) INTRAVENOUS AS NEEDED
Status: DISCONTINUED | OUTPATIENT
Start: 2018-02-18 | End: 2018-02-18 | Stop reason: SURG

## 2018-02-18 RX ORDER — LIDOCAINE HYDROCHLORIDE 40 MG/ML
SOLUTION TOPICAL AS NEEDED
Status: DISCONTINUED | OUTPATIENT
Start: 2018-02-18 | End: 2018-02-18 | Stop reason: SURG

## 2018-02-18 RX ORDER — PROMETHAZINE HYDROCHLORIDE 25 MG/ML
12.5 INJECTION, SOLUTION INTRAMUSCULAR; INTRAVENOUS ONCE AS NEEDED
Status: DISCONTINUED | OUTPATIENT
Start: 2018-02-18 | End: 2018-02-18 | Stop reason: HOSPADM

## 2018-02-18 RX ORDER — MIDAZOLAM HYDROCHLORIDE 1 MG/ML
2 INJECTION INTRAMUSCULAR; INTRAVENOUS
Status: DISCONTINUED | OUTPATIENT
Start: 2018-02-18 | End: 2018-02-18 | Stop reason: HOSPADM

## 2018-02-18 RX ORDER — NALOXONE HCL 0.4 MG/ML
0.4 VIAL (ML) INJECTION
Status: DISCONTINUED | OUTPATIENT
Start: 2018-02-18 | End: 2018-02-19

## 2018-02-18 RX ORDER — DOCUSATE SODIUM 100 MG/1
100 CAPSULE, LIQUID FILLED ORAL 2 TIMES DAILY PRN
Status: DISCONTINUED | OUTPATIENT
Start: 2018-02-18 | End: 2018-02-19

## 2018-02-18 RX ORDER — OXYCODONE HYDROCHLORIDE AND ACETAMINOPHEN 5; 325 MG/1; MG/1
2 TABLET ORAL EVERY 4 HOURS PRN
Status: DISCONTINUED | OUTPATIENT
Start: 2018-02-18 | End: 2018-02-19

## 2018-02-18 RX ORDER — LIDOCAINE HYDROCHLORIDE 10 MG/ML
0.5 INJECTION, SOLUTION EPIDURAL; INFILTRATION; INTRACAUDAL; PERINEURAL ONCE AS NEEDED
Status: DISCONTINUED | OUTPATIENT
Start: 2018-02-18 | End: 2018-02-18 | Stop reason: HOSPADM

## 2018-02-18 RX ORDER — ONDANSETRON 2 MG/ML
4 INJECTION INTRAMUSCULAR; INTRAVENOUS EVERY 6 HOURS PRN
Status: DISCONTINUED | OUTPATIENT
Start: 2018-02-18 | End: 2018-02-21 | Stop reason: HOSPADM

## 2018-02-18 RX ORDER — HYDRALAZINE HYDROCHLORIDE 20 MG/ML
5 INJECTION INTRAMUSCULAR; INTRAVENOUS
Status: DISCONTINUED | OUTPATIENT
Start: 2018-02-18 | End: 2018-02-18 | Stop reason: HOSPADM

## 2018-02-18 RX ORDER — PROMETHAZINE HYDROCHLORIDE 25 MG/1
25 SUPPOSITORY RECTAL ONCE AS NEEDED
Status: DISCONTINUED | OUTPATIENT
Start: 2018-02-18 | End: 2018-02-18 | Stop reason: HOSPADM

## 2018-02-18 RX ORDER — EPHEDRINE SULFATE 50 MG/ML
INJECTION, SOLUTION INTRAVENOUS AS NEEDED
Status: DISCONTINUED | OUTPATIENT
Start: 2018-02-18 | End: 2018-02-18 | Stop reason: SURG

## 2018-02-18 RX ORDER — UREA 10 %
1 LOTION (ML) TOPICAL NIGHTLY PRN
Status: DISCONTINUED | OUTPATIENT
Start: 2018-02-18 | End: 2018-02-21 | Stop reason: HOSPADM

## 2018-02-18 RX ORDER — FAMOTIDINE 10 MG/ML
20 INJECTION, SOLUTION INTRAVENOUS ONCE
Status: COMPLETED | OUTPATIENT
Start: 2018-02-18 | End: 2018-02-18

## 2018-02-18 RX ORDER — CEFAZOLIN SODIUM 2 G/100ML
2 INJECTION, SOLUTION INTRAVENOUS EVERY 8 HOURS
Status: COMPLETED | OUTPATIENT
Start: 2018-02-18 | End: 2018-02-19

## 2018-02-18 RX ORDER — ROCURONIUM BROMIDE 10 MG/ML
INJECTION, SOLUTION INTRAVENOUS AS NEEDED
Status: DISCONTINUED | OUTPATIENT
Start: 2018-02-18 | End: 2018-02-18 | Stop reason: SURG

## 2018-02-18 RX ORDER — SODIUM CHLORIDE, SODIUM LACTATE, POTASSIUM CHLORIDE, CALCIUM CHLORIDE 600; 310; 30; 20 MG/100ML; MG/100ML; MG/100ML; MG/100ML
75 INJECTION, SOLUTION INTRAVENOUS CONTINUOUS
Status: DISCONTINUED | OUTPATIENT
Start: 2018-02-18 | End: 2018-02-20

## 2018-02-18 RX ORDER — MORPHINE SULFATE 10 MG/ML
6 INJECTION INTRAMUSCULAR; INTRAVENOUS; SUBCUTANEOUS
Status: DISCONTINUED | OUTPATIENT
Start: 2018-02-18 | End: 2018-02-19

## 2018-02-18 RX ORDER — FENTANYL CITRATE 50 UG/ML
INJECTION, SOLUTION INTRAMUSCULAR; INTRAVENOUS AS NEEDED
Status: DISCONTINUED | OUTPATIENT
Start: 2018-02-18 | End: 2018-02-18 | Stop reason: SURG

## 2018-02-18 RX ORDER — BISACODYL 5 MG/1
10 TABLET, DELAYED RELEASE ORAL DAILY PRN
Status: DISCONTINUED | OUTPATIENT
Start: 2018-02-18 | End: 2018-02-21 | Stop reason: HOSPADM

## 2018-02-18 RX ORDER — SODIUM CHLORIDE 0.9 % (FLUSH) 0.9 %
1-10 SYRINGE (ML) INJECTION AS NEEDED
Status: DISCONTINUED | OUTPATIENT
Start: 2018-02-18 | End: 2018-02-18 | Stop reason: HOSPADM

## 2018-02-18 RX ORDER — SODIUM CHLORIDE 0.9 % (FLUSH) 0.9 %
1-10 SYRINGE (ML) INJECTION AS NEEDED
Status: DISCONTINUED | OUTPATIENT
Start: 2018-02-18 | End: 2018-02-21 | Stop reason: HOSPADM

## 2018-02-18 RX ORDER — LABETALOL HYDROCHLORIDE 5 MG/ML
5 INJECTION, SOLUTION INTRAVENOUS
Status: DISCONTINUED | OUTPATIENT
Start: 2018-02-18 | End: 2018-02-18 | Stop reason: HOSPADM

## 2018-02-18 RX ORDER — ONDANSETRON 2 MG/ML
4 INJECTION INTRAMUSCULAR; INTRAVENOUS ONCE AS NEEDED
Status: DISCONTINUED | OUTPATIENT
Start: 2018-02-18 | End: 2018-02-18 | Stop reason: HOSPADM

## 2018-02-18 RX ORDER — CEFAZOLIN SODIUM 2 G/100ML
INJECTION, SOLUTION INTRAVENOUS AS NEEDED
Status: DISCONTINUED | OUTPATIENT
Start: 2018-02-18 | End: 2018-02-18 | Stop reason: SURG

## 2018-02-18 RX ORDER — ONDANSETRON 2 MG/ML
INJECTION INTRAMUSCULAR; INTRAVENOUS AS NEEDED
Status: DISCONTINUED | OUTPATIENT
Start: 2018-02-18 | End: 2018-02-18 | Stop reason: SURG

## 2018-02-18 RX ORDER — FENTANYL CITRATE 50 UG/ML
INJECTION, SOLUTION INTRAMUSCULAR; INTRAVENOUS
Status: COMPLETED
Start: 2018-02-18 | End: 2018-02-18

## 2018-02-18 RX ORDER — SODIUM CHLORIDE, SODIUM LACTATE, POTASSIUM CHLORIDE, CALCIUM CHLORIDE 600; 310; 30; 20 MG/100ML; MG/100ML; MG/100ML; MG/100ML
9 INJECTION, SOLUTION INTRAVENOUS CONTINUOUS
Status: DISCONTINUED | OUTPATIENT
Start: 2018-02-18 | End: 2018-02-19

## 2018-02-18 RX ORDER — NALOXONE HCL 0.4 MG/ML
0.2 VIAL (ML) INJECTION AS NEEDED
Status: DISCONTINUED | OUTPATIENT
Start: 2018-02-18 | End: 2018-02-18 | Stop reason: HOSPADM

## 2018-02-18 RX ORDER — DIPHENHYDRAMINE HYDROCHLORIDE 50 MG/ML
12.5 INJECTION INTRAMUSCULAR; INTRAVENOUS
Status: DISCONTINUED | OUTPATIENT
Start: 2018-02-18 | End: 2018-02-18 | Stop reason: HOSPADM

## 2018-02-18 RX ORDER — LIDOCAINE HYDROCHLORIDE 20 MG/ML
INJECTION, SOLUTION INFILTRATION; PERINEURAL AS NEEDED
Status: DISCONTINUED | OUTPATIENT
Start: 2018-02-18 | End: 2018-02-18 | Stop reason: SURG

## 2018-02-18 RX ORDER — MIDAZOLAM HYDROCHLORIDE 1 MG/ML
1 INJECTION INTRAMUSCULAR; INTRAVENOUS
Status: DISCONTINUED | OUTPATIENT
Start: 2018-02-18 | End: 2018-02-18 | Stop reason: HOSPADM

## 2018-02-18 RX ORDER — DEXAMETHASONE SODIUM PHOSPHATE 10 MG/ML
INJECTION INTRAMUSCULAR; INTRAVENOUS AS NEEDED
Status: DISCONTINUED | OUTPATIENT
Start: 2018-02-18 | End: 2018-02-18 | Stop reason: SURG

## 2018-02-18 RX ORDER — HYDROMORPHONE HCL 110MG/55ML
PATIENT CONTROLLED ANALGESIA SYRINGE INTRAVENOUS AS NEEDED
Status: DISCONTINUED | OUTPATIENT
Start: 2018-02-18 | End: 2018-02-18 | Stop reason: SURG

## 2018-02-18 RX ADMIN — PRIMIDONE 100 MG: 50 TABLET ORAL at 13:08

## 2018-02-18 RX ADMIN — FAMOTIDINE 20 MG: 10 INJECTION INTRAVENOUS at 07:32

## 2018-02-18 RX ADMIN — CALCIUM CARBONATE-VITAMIN D TAB 500 MG-200 UNIT 500 MG: 500-200 TAB at 20:34

## 2018-02-18 RX ADMIN — SODIUM CHLORIDE, POTASSIUM CHLORIDE, SODIUM LACTATE AND CALCIUM CHLORIDE 9 ML/HR: 600; 310; 30; 20 INJECTION, SOLUTION INTRAVENOUS at 07:32

## 2018-02-18 RX ADMIN — HYDROMORPHONE HYDROCHLORIDE 0.5 MG: 2 INJECTION INTRAMUSCULAR; INTRAVENOUS; SUBCUTANEOUS at 09:45

## 2018-02-18 RX ADMIN — AMLODIPINE BESYLATE 5 MG: 5 TABLET ORAL at 13:08

## 2018-02-18 RX ADMIN — LIDOCAINE HYDROCHLORIDE 1 EACH: 40 SPRAY LARYNGEAL; TRANSTRACHEAL at 08:05

## 2018-02-18 RX ADMIN — CEFAZOLIN SODIUM 2 G: 2 INJECTION, SOLUTION INTRAVENOUS at 16:12

## 2018-02-18 RX ADMIN — HYDROMORPHONE HYDROCHLORIDE 0.5 MG: 2 INJECTION INTRAMUSCULAR; INTRAVENOUS; SUBCUTANEOUS at 09:48

## 2018-02-18 RX ADMIN — CEFAZOLIN SODIUM 2 G: 2 INJECTION, SOLUTION INTRAVENOUS at 08:10

## 2018-02-18 RX ADMIN — SODIUM CHLORIDE, POTASSIUM CHLORIDE, SODIUM LACTATE AND CALCIUM CHLORIDE 75 ML/HR: 600; 310; 30; 20 INJECTION, SOLUTION INTRAVENOUS at 13:10

## 2018-02-18 RX ADMIN — MIDAZOLAM 0.5 MG: 1 INJECTION INTRAMUSCULAR; INTRAVENOUS at 07:33

## 2018-02-18 RX ADMIN — OXYCODONE HYDROCHLORIDE AND ACETAMINOPHEN 1 TABLET: 5; 325 TABLET ORAL at 13:50

## 2018-02-18 RX ADMIN — FENTANYL CITRATE 50 MCG: 50 INJECTION, SOLUTION INTRAMUSCULAR; INTRAVENOUS at 09:11

## 2018-02-18 RX ADMIN — ONDANSETRON 4 MG: 2 INJECTION INTRAMUSCULAR; INTRAVENOUS at 09:25

## 2018-02-18 RX ADMIN — HYDROCODONE BITARTRATE AND ACETAMINOPHEN 1 TABLET: 5; 325 TABLET ORAL at 23:37

## 2018-02-18 RX ADMIN — SUGAMMADEX 300 MG: 100 INJECTION, SOLUTION INTRAVENOUS at 09:36

## 2018-02-18 RX ADMIN — SODIUM CHLORIDE, POTASSIUM CHLORIDE, SODIUM LACTATE AND CALCIUM CHLORIDE: 600; 310; 30; 20 INJECTION, SOLUTION INTRAVENOUS at 09:15

## 2018-02-18 RX ADMIN — DONEPEZIL HYDROCHLORIDE 5 MG: 5 TABLET, FILM COATED ORAL at 20:34

## 2018-02-18 RX ADMIN — CEFAZOLIN SODIUM 2 G: 2 INJECTION, SOLUTION INTRAVENOUS at 23:37

## 2018-02-18 RX ADMIN — PROPOFOL 100 MG: 10 INJECTION, EMULSION INTRAVENOUS at 08:05

## 2018-02-18 RX ADMIN — RIVAROXABAN 10 MG: 10 TABLET, FILM COATED ORAL at 18:22

## 2018-02-18 RX ADMIN — LORAZEPAM 0.5 MG: 0.5 TABLET ORAL at 13:42

## 2018-02-18 RX ADMIN — FENTANYL CITRATE 50 MCG: 50 INJECTION, SOLUTION INTRAMUSCULAR; INTRAVENOUS at 08:27

## 2018-02-18 RX ADMIN — EPHEDRINE SULFATE 10 MG: 50 INJECTION INTRAMUSCULAR; INTRAVENOUS; SUBCUTANEOUS at 09:20

## 2018-02-18 RX ADMIN — EPHEDRINE SULFATE 10 MG: 50 INJECTION INTRAMUSCULAR; INTRAVENOUS; SUBCUTANEOUS at 08:08

## 2018-02-18 RX ADMIN — HYDROMORPHONE HYDROCHLORIDE 0.5 MG: 10 INJECTION INTRAMUSCULAR; INTRAVENOUS; SUBCUTANEOUS at 06:39

## 2018-02-18 RX ADMIN — MIRTAZAPINE 15 MG: 15 TABLET, FILM COATED ORAL at 20:34

## 2018-02-18 RX ADMIN — FENTANYL CITRATE 50 MCG: 50 INJECTION, SOLUTION INTRAMUSCULAR; INTRAVENOUS at 08:48

## 2018-02-18 RX ADMIN — CALCIUM CARBONATE-VITAMIN D TAB 500 MG-200 UNIT 500 MG: 500-200 TAB at 13:42

## 2018-02-18 RX ADMIN — Medication 1 TABLET: at 13:43

## 2018-02-18 RX ADMIN — LIDOCAINE HYDROCHLORIDE 60 MG: 20 INJECTION, SOLUTION INFILTRATION; PERINEURAL at 08:05

## 2018-02-18 RX ADMIN — DEXAMETHASONE SODIUM PHOSPHATE 8 MG: 10 INJECTION INTRAMUSCULAR; INTRAVENOUS at 08:30

## 2018-02-18 RX ADMIN — FENTANYL CITRATE 100 MCG: 50 INJECTION, SOLUTION INTRAMUSCULAR; INTRAVENOUS at 08:05

## 2018-02-18 RX ADMIN — CITALOPRAM 20 MG: 20 TABLET, FILM COATED ORAL at 13:42

## 2018-02-18 RX ADMIN — FENTANYL CITRATE 25 MCG: 50 INJECTION, SOLUTION INTRAMUSCULAR; INTRAVENOUS at 07:33

## 2018-02-18 RX ADMIN — EPHEDRINE SULFATE 10 MG: 50 INJECTION INTRAMUSCULAR; INTRAVENOUS; SUBCUTANEOUS at 09:00

## 2018-02-18 RX ADMIN — ROCURONIUM BROMIDE 40 MG: 10 INJECTION INTRAVENOUS at 08:05

## 2018-02-18 RX ADMIN — HYDROMORPHONE HYDROCHLORIDE 0.5 MG: 10 INJECTION INTRAMUSCULAR; INTRAVENOUS; SUBCUTANEOUS at 02:39

## 2018-02-18 NOTE — ANESTHESIA PROCEDURE NOTES
Airway  Urgency: elective    Airway not difficult    General Information and Staff    Patient location during procedure: OR  Anesthesiologist: ARCHIE GAN  CRNA: LEXX HOLLINS    Indications and Patient Condition  Indications for airway management: airway protection    Preoxygenated: yes  MILS not maintained throughout  Mask difficulty assessment: 1 - vent by mask    Final Airway Details  Final airway type: endotracheal airway      Successful airway: ETT  Cuffed: yes   Successful intubation technique: direct laryngoscopy  Endotracheal tube insertion site: oral  Blade: John  Blade size: #3  ETT size: 7.0 mm  Cormack-Lehane Classification: grade I - full view of glottis  Placement verified by: chest auscultation and capnometry   Measured from: lips  ETT to lips (cm): 20  Number of attempts at approach: 1    Additional Comments  Dentition intact and unchanged. CBEBS.  +ETCO2.

## 2018-02-18 NOTE — ANESTHESIA POSTPROCEDURE EVALUATION
"Patient: Alona Cisneros    Procedure Summary     Date Anesthesia Start Anesthesia Stop Room / Location    02/18/18 0759 1004  [facility] /  [facility]       Procedure Diagnosis Surgeon Provider    RIGHT FEMUR PERIPROSTHETIC FRACTURE REPAIR OPEN REDUCTION INTERNAL FIXATION  (Right Thigh) No diagnosis on file. MD Sarbjit Peng MD          Anesthesia Type: general  Last vitals  BP   138/60 (02/18/18 1135)   Temp   36.4 °C (97.6 °F) (02/18/18 1135)   Pulse   73 (02/18/18 1135)   Resp   12 (02/18/18 1135)     SpO2   95 % (02/18/18 1135)     Post Anesthesia Care and Evaluation    Patient location during evaluation: PACU  Patient participation: complete - patient participated  Level of consciousness: awake and alert  Pain management: adequate  Airway patency: patent  Anesthetic complications: No anesthetic complications    Cardiovascular status: acceptable  Respiratory status: acceptable  Hydration status: acceptable    Comments: /60  Pulse 73  Temp 36.4 °C (97.6 °F) (Oral)   Resp 12  Ht 177.8 cm (70\")  Wt 57.2 kg (126 lb)  SpO2 95%  BMI 18.08 kg/m2      "

## 2018-02-18 NOTE — PLAN OF CARE
Problem: Patient Care Overview (Adult)  Goal: Plan of Care Review  Outcome: Ongoing (interventions implemented as appropriate)   02/18/18 1139   Coping/Psychosocial Response Interventions   Plan Of Care Reviewed With patient   Patient Care Overview   Progress improving   Outcome Evaluation   Outcome Summary/Follow up Plan pt pacu stay uneventful. no pain reported. xrays are ok. family sent to room.

## 2018-02-18 NOTE — PLAN OF CARE
Problem: Patient Care Overview (Adult)  Goal: Plan of Care Review  Outcome: Ongoing (interventions implemented as appropriate)   02/18/18 0246   Coping/Psychosocial Response Interventions   Plan Of Care Reviewed With patient   Patient Care Overview   Progress progress toward functional goals as expected   Outcome Evaluation   Outcome Summary/Follow up Plan Pt admitted from the ER with a rt femur fracture. Surgery scheduled for the AM.ORIF to the femur. Pt has been NPO since midnight. Consent signed and on chart. IV pain medicine given with good pain control noted. Pt continues with the mcpherson in place. Pt educated on the importance of monitoring blood pressure related to comorbidity of HTN. Pt voiced understanding. Will continue to monitor.     Goal: Adult Individualization and Mutuality  Outcome: Ongoing (interventions implemented as appropriate)    Goal: Discharge Needs Assessment  Outcome: Ongoing (interventions implemented as appropriate)      Problem: Fall Risk (Adult)  Goal: Absence of Falls  Outcome: Ongoing (interventions implemented as appropriate)    Goal: Absence of Falls  Outcome: Ongoing (interventions implemented as appropriate)      Problem: Orthopaedic Fracture (Adult)  Goal: Signs and Symptoms of Listed Potential Problems Will be Absent or Manageable (Orthopaedic Fracture)  Outcome: Ongoing (interventions implemented as appropriate)      Problem: Pain, Acute (Adult)  Goal: Acceptable Pain Control/Comfort Level  Outcome: Ongoing (interventions implemented as appropriate)      Problem: Pressure Ulcer Risk (Yassine Scale) (Adult,Obstetrics,Pediatric)  Goal: Skin Integrity  Outcome: Ongoing (interventions implemented as appropriate)

## 2018-02-18 NOTE — PROGRESS NOTES
Hospital Follow Up    LOS:  LOS: 1 day   Patient Name: Alona Cisneros  Age/Sex: 82 y.o. female  : 1935  MRN: [id number]    Day of Service: 18   Length of Stay: 1  Encounter Provider: Mor Siegel MD  Place of Service: [facility] CARDIOLOGY  Patient Care Team:  Pierre Ibrahim MD as PCP - General  Pierre Ibrahim MD as PCP - Family Medicine  David Tobin MD as PCP - Claims Attributed    Subjective:     Chief Complaint: Perioperative assessment.    Interval History: Patient status post surgery this morning.  Ceftin she's doing well with no complaints.    Objective:     Objective:  Temp:  [96.9 °F (36.1 °C)-99.3 °F (37.4 °C)] 96.9 °F (36.1 °C)  Heart Rate:  [68-94] 94  Resp:  [12-16] 14  BP: (108-162)/(60-98) 116/65     Intake/Output Summary (Last 24 hours) at 18 1550  Last data filed at 18 1126   Gross per 24 hour   Intake             1600 ml   Output              950 ml   Net              650 ml     Body mass index is 18.08 kg/(m^2).  Last 3 weights    18  0341   Weight: 57.2 kg (126 lb)     Weight change:       Physical Exam:   General : Alert, cooperative, in no acute distress.  Neuro: alert,cooperative and oriented  Lungs: CTAB. Normal respiratory effort and rate.  CV:: Regular rate and rhythm, normal S1 and S2, no murmurs, gallops or rubs.  ABD: Soft, nontender, non-distended. positive bowel sounds  Extr: No edema or cyanosis, moves all extremities    Lab Review:     Results from last 7 days  Lab Units 18  0335 18  0433   SODIUM mmol/L 138 141   POTASSIUM mmol/L 3.8 3.6   CHLORIDE mmol/L 103 102   CO2 mmol/L 27.1 27.3   BUN mg/dL 21 14   CREATININE mg/dL 0.63 0.63   GLUCOSE mg/dL 108* 96   CALCIUM mg/dL 7.9* 8.9   AST (SGOT) U/L  --  22   ALT (SGPT) U/L  --  20           Results from last 7 days  Lab Units 18  1236 18  0335 18  0433   WBC 10*3/mm3  --  4.77 4.38*   HEMOGLOBIN g/dL 11.1* 11.4* 13.6   HEMATOCRIT %  35.4* 36.9 42.9   PLATELETS 10*3/mm3  --  143 189       Results from last 7 days  Lab Units 02/18/18  1236 02/17/18  0433   INR  1.14* 1.09   APTT seconds  --  25.8               Invalid input(s): LDLCALC          Current Medications:   Scheduled Meds:  amLODIPine 5 mg Oral Daily   calcium-vitamin D 500 mg Oral BID   ceFAZolin 2 g Intravenous Q8H   citalopram 20 mg Oral Daily   donepezil 5 mg Oral Nightly   HYDROmorphone      LORazepam 0.5 mg Oral Daily   mirtazapine 15 mg Oral Nightly   IV Fluids 1000 mL + additives 100 mL/hr Intravenous Daily   multivitamin 1 tablet Oral Daily   primidone 100 mg Oral Daily   rivaroxaban 10 mg Oral Daily With Dinner     Continuous Infusions:  lactated ringers 9 mL/hr Last Rate: 9 mL/hr (02/18/18 0732)   lactated ringers 75 mL/hr Last Rate: 75 mL/hr (02/18/18 1310)   sodium chloride 125 mL/hr Last Rate: Stopped (02/18/18 0730)       Allergies:  No Known Allergies    Assessment:     Principal Problem:    Closed displaced oblique fracture of shaft of right femur  Active Problems:    Degeneration of lumbar or lumbosacral intervertebral disc    Fall from standing    Anxiety    Hypertension    Tremors of nervous system        Plan:       Patient tolerated surgery.  Vital signs good clinically doing well no issues.  We'll sign off thank you for allowing me to care for this patient    Mor Siegel MD  02/18/18  3:50 PM

## 2018-02-18 NOTE — ANESTHESIA PREPROCEDURE EVALUATION
Anesthesia Evaluation     Patient summary reviewed and Nursing notes reviewed   NPO Solid Status: > 8 hours  NPO Liquid Status: > 8 hours           Airway   Mallampati: II  TM distance: >3 FB  Neck ROM: full  no difficulty expected  Dental - normal exam     Pulmonary - negative pulmonary ROS and normal exam    breath sounds clear to auscultation  (-) shortness of breath, sleep apnea, decreased breath sounds, wheezes  Cardiovascular - normal exam  Exercise tolerance: good (4-7 METS)    Rhythm: regular  Rate: normal    (+) hypertension well controlled,   (-) CAD, angina, CHF, orthopnea, PND, SHARPE, PVD      Neuro/Psych- negative ROS  (-) seizures, neuromuscular disease, TIA, CVA, headaches, syncope, weakness, numbness  GI/Hepatic/Renal/Endo - negative ROS   (-) liver disease, diabetes    Musculoskeletal (-) negative ROS    Abdominal  - normal exam   Substance History - negative use  (-) alcohol use, drug use     OB/GYN negative ob/gyn ROS         Other - negative ROS                     Anesthesia Plan    ASA 3     general     intravenous induction

## 2018-02-18 NOTE — ADDENDUM NOTE
Addendum  created 02/18/18 1206 by Sarbjit Infante MD    Anesthesia Attestations deleted, Anesthesia Attestations filed

## 2018-02-18 NOTE — PROGRESS NOTES
"DAILY PROGRESS NOTE  [facility]    Patient Identification:  Name: Alona Cisneros  Age: 82 y.o.  Sex: female  :  1935  MRN: [id number]         Primary Care Physician: Pierre Ibrahim MD    Subjective:  Interval History:She feels better after surgery.    Objective:    Scheduled Meds:  amLODIPine 5 mg Oral Daily   [MAR Hold] calcium-vitamin D 500 mg Oral BID   ceFAZolin 2 g Intravenous Q8H   [MAR Hold] citalopram 20 mg Oral Daily   [MAR Hold] donepezil 5 mg Oral Nightly   HYDROmorphone      [MAR Hold] LORazepam 0.5 mg Oral Daily   [MAR Hold] mirtazapine 15 mg Oral Nightly   [MAR Hold] IV Fluids 1000 mL + additives 100 mL/hr Intravenous Daily   [MAR Hold] multivitamin 1 tablet Oral Daily   primidone 100 mg Oral Daily   rivaroxaban 10 mg Oral Daily With Dinner     Continuous Infusions:  lactated ringers 9 mL/hr Last Rate: 9 mL/hr (18 0732)   lactated ringers 75 mL/hr    sodium chloride 125 mL/hr Last Rate: Stopped (18 0730)       Vital signs in last 24 hours:  Temp:  [96.9 °F (36.1 °C)-99.3 °F (37.4 °C)] 96.9 °F (36.1 °C)  Heart Rate:  [68-90] 78  Resp:  [12-16] 14  BP: (108-162)/(60-98) 124/67    Intake/Output:    Intake/Output Summary (Last 24 hours) at 18 1225  Last data filed at 18 1126   Gross per 24 hour   Intake             1600 ml   Output              950 ml   Net              650 ml       Exam:  /67 (BP Location: Right arm, Patient Position: Lying)  Pulse 78  Temp 96.9 °F (36.1 °C) (Oral)   Resp 14  Ht 177.8 cm (70\")  Wt 57.2 kg (126 lb)  SpO2 95%  BMI 18.08 kg/m2    General Appearance:    Alert, cooperative, no distress   Head:    Normocephalic, without obvious abnormality, atraumatic   Eyes:       Throat:   Lips, tongue, gums normal   Neck:   Supple, symmetrical, trachea midline, no JVD   Lungs:     Clear to auscultation bilaterally, respirations unlabored   Chest Wall:    No tenderness or deformity    Heart:    Regular rate and rhythm, S1 and S2 " normal, no murmur,no  Rub or gallop   Abdomen:     Soft, non-tender, bowel sounds active, no masses, no organomegaly    Extremities:   Extremities normal, surgical changes right leg, no cyanosis or edema   Pulses:      Skin:   Skin is warm and dry,  no rashes or palpable lesions   Neurologic:   no focal deficits noted      [unfilled]  Data Review:    Results from last 7 days  Lab Units 02/18/18  0335 02/17/18  0433   SODIUM mmol/L 138 141   POTASSIUM mmol/L 3.8 3.6   CHLORIDE mmol/L 103 102   CO2 mmol/L 27.1 27.3   BUN mg/dL 21 14   CREATININE mg/dL 0.63 0.63   GLUCOSE mg/dL 108* 96   CALCIUM mg/dL 7.9* 8.9       Results from last 7 days  Lab Units 02/18/18  0335 02/17/18  0433   WBC 10*3/mm3 4.77 4.38*   HEMOGLOBIN g/dL 11.4* 13.6   HEMATOCRIT % 36.9 42.9   PLATELETS 10*3/mm3 143 189             No results found for: TROPONINT        Results from last 7 days  Lab Units 02/17/18  0433   ALK PHOS U/L 69   BILIRUBIN mg/dL 0.4   ALT (SGPT) U/L 20   AST (SGOT) U/L 22             No results found for: POCGLU    Results from last 7 days  Lab Units 02/17/18  0433   INR  1.09       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   • Closed displaced oblique fracture of shaft of right femur S72.331A   • Fall from standing W19.XXXA   • Anxiety F41.9   • Hypertension I10   • Tremors of nervous system R25.1       Assessment:  Active Hospital Problems (** Indicates Principal Problem)    Diagnosis Date Noted   • **Closed displaced oblique fracture of shaft of right femur [S72.331A] 02/17/2018   • Fall from standing [W19.XXXA] 02/17/2018   • Anxiety [F41.9] 02/17/2018   • Hypertension [I10] 02/17/2018   • Tremors of nervous system [R25.1] 02/17/2018   • Degeneration of lumbar or lumbosacral intervertebral disc [M51.37] 06/01/2017      Resolved Hospital Problems    Diagnosis Date Noted Date Resolved   No resolved problems to display.   S/P  ORIF femur fracture.    Plan:  Post op care. Follow labs, Will need SNU for rehab.    Bobo Finley MD  2/18/2018  12:25 PM

## 2018-02-19 PROBLEM — R63.6 UNDERWEIGHT: Status: ACTIVE | Noted: 2018-02-19

## 2018-02-19 PROBLEM — F10.939 ALCOHOL WITHDRAWAL (HCC): Status: ACTIVE | Noted: 2018-02-19

## 2018-02-19 PROBLEM — M80.00XA OSTEOPOROSIS WITH PATHOLOGICAL FRACTURE: Status: ACTIVE | Noted: 2018-02-19

## 2018-02-19 LAB
ANION GAP SERPL CALCULATED.3IONS-SCNC: 8.8 MMOL/L
BASOPHILS # BLD AUTO: 0.02 10*3/MM3 (ref 0–0.2)
BASOPHILS NFR BLD AUTO: 0.3 % (ref 0–1.5)
BUN BLD-MCNC: 15 MG/DL (ref 8–23)
BUN/CREAT SERPL: 26.8 (ref 7–25)
CALCIUM SPEC-SCNC: 8.8 MG/DL (ref 8.6–10.5)
CHLORIDE SERPL-SCNC: 100 MMOL/L (ref 98–107)
CO2 SERPL-SCNC: 28.2 MMOL/L (ref 22–29)
CREAT BLD-MCNC: 0.56 MG/DL (ref 0.57–1)
DEPRECATED RDW RBC AUTO: 51.4 FL (ref 37–54)
EOSINOPHIL # BLD AUTO: 0.06 10*3/MM3 (ref 0–0.7)
EOSINOPHIL NFR BLD AUTO: 0.8 % (ref 0.3–6.2)
ERYTHROCYTE [DISTWIDTH] IN BLOOD BY AUTOMATED COUNT: 14.3 % (ref 11.7–13)
GFR SERPL CREATININE-BSD FRML MDRD: 104 ML/MIN/1.73
GLUCOSE BLD-MCNC: 115 MG/DL (ref 65–99)
HCT VFR BLD AUTO: 29.6 % (ref 35.6–45.5)
HGB BLD-MCNC: 9.4 G/DL (ref 11.9–15.5)
IMM GRANULOCYTES # BLD: 0.02 10*3/MM3 (ref 0–0.03)
IMM GRANULOCYTES NFR BLD: 0.3 % (ref 0–0.5)
INR PPP: 1.61 (ref 0.9–1.1)
LYMPHOCYTES # BLD AUTO: 1.13 10*3/MM3 (ref 0.9–4.8)
LYMPHOCYTES NFR BLD AUTO: 14.7 % (ref 19.6–45.3)
MCH RBC QN AUTO: 31.5 PG (ref 26.9–32)
MCHC RBC AUTO-ENTMCNC: 31.8 G/DL (ref 32.4–36.3)
MCV RBC AUTO: 99.3 FL (ref 80.5–98.2)
MONOCYTES # BLD AUTO: 0.82 10*3/MM3 (ref 0.2–1.2)
MONOCYTES NFR BLD AUTO: 10.7 % (ref 5–12)
NEUTROPHILS # BLD AUTO: 5.63 10*3/MM3 (ref 1.9–8.1)
NEUTROPHILS NFR BLD AUTO: 73.2 % (ref 42.7–76)
PLATELET # BLD AUTO: 121 10*3/MM3 (ref 140–500)
PMV BLD AUTO: 11.6 FL (ref 6–12)
POTASSIUM BLD-SCNC: 3.9 MMOL/L (ref 3.5–5.2)
PROTHROMBIN TIME: 18.9 SECONDS (ref 11.7–14.2)
RBC # BLD AUTO: 2.98 10*6/MM3 (ref 3.9–5.2)
SODIUM BLD-SCNC: 137 MMOL/L (ref 136–145)
WBC NRBC COR # BLD: 7.68 10*3/MM3 (ref 4.5–10.7)

## 2018-02-19 PROCEDURE — 97110 THERAPEUTIC EXERCISES: CPT

## 2018-02-19 PROCEDURE — 99024 POSTOP FOLLOW-UP VISIT: CPT | Performed by: ORTHOPAEDIC SURGERY

## 2018-02-19 PROCEDURE — 25010000002 MAGNESIUM SULFATE PER 500 MG OF MAGNESIUM: Performed by: ORTHOPAEDIC SURGERY

## 2018-02-19 PROCEDURE — 25010000002 THIAMINE PER 100 MG: Performed by: ORTHOPAEDIC SURGERY

## 2018-02-19 PROCEDURE — 85610 PROTHROMBIN TIME: CPT | Performed by: ORTHOPAEDIC SURGERY

## 2018-02-19 PROCEDURE — 85025 COMPLETE CBC W/AUTO DIFF WBC: CPT | Performed by: HOSPITALIST

## 2018-02-19 PROCEDURE — 97162 PT EVAL MOD COMPLEX 30 MIN: CPT

## 2018-02-19 PROCEDURE — 80048 BASIC METABOLIC PNL TOTAL CA: CPT | Performed by: HOSPITALIST

## 2018-02-19 RX ORDER — DOCUSATE SODIUM 100 MG/1
100 CAPSULE, LIQUID FILLED ORAL 2 TIMES DAILY
Status: DISCONTINUED | OUTPATIENT
Start: 2018-02-19 | End: 2018-02-21 | Stop reason: HOSPADM

## 2018-02-19 RX ORDER — OXYCODONE HYDROCHLORIDE AND ACETAMINOPHEN 5; 325 MG/1; MG/1
1 TABLET ORAL EVERY 4 HOURS PRN
Status: DISCONTINUED | OUTPATIENT
Start: 2018-02-19 | End: 2018-02-20

## 2018-02-19 RX ORDER — LORAZEPAM 0.5 MG/1
0.5 TABLET ORAL EVERY 6 HOURS PRN
Status: DISCONTINUED | OUTPATIENT
Start: 2018-02-19 | End: 2018-02-21 | Stop reason: HOSPADM

## 2018-02-19 RX ORDER — HYDROCODONE BITARTRATE AND ACETAMINOPHEN 5; 325 MG/1; MG/1
1 TABLET ORAL EVERY 4 HOURS PRN
Status: DISCONTINUED | OUTPATIENT
Start: 2018-02-19 | End: 2018-02-19

## 2018-02-19 RX ORDER — POLYETHYLENE GLYCOL 3350 17 G/17G
17 POWDER, FOR SOLUTION ORAL DAILY
Status: DISCONTINUED | OUTPATIENT
Start: 2018-02-19 | End: 2018-02-21 | Stop reason: HOSPADM

## 2018-02-19 RX ORDER — OXYCODONE HYDROCHLORIDE AND ACETAMINOPHEN 5; 325 MG/1; MG/1
2 TABLET ORAL EVERY 4 HOURS PRN
Status: DISCONTINUED | OUTPATIENT
Start: 2018-02-19 | End: 2018-02-20

## 2018-02-19 RX ADMIN — AMLODIPINE BESYLATE 5 MG: 5 TABLET ORAL at 08:58

## 2018-02-19 RX ADMIN — CALCIUM CARBONATE-VITAMIN D TAB 500 MG-200 UNIT 500 MG: 500-200 TAB at 08:58

## 2018-02-19 RX ADMIN — PRIMIDONE 100 MG: 50 TABLET ORAL at 08:59

## 2018-02-19 RX ADMIN — HYDROCODONE BITARTRATE AND ACETAMINOPHEN 1 TABLET: 5; 325 TABLET ORAL at 05:22

## 2018-02-19 RX ADMIN — Medication 1 TABLET: at 08:59

## 2018-02-19 RX ADMIN — MIRTAZAPINE 15 MG: 15 TABLET, FILM COATED ORAL at 20:19

## 2018-02-19 RX ADMIN — DOCUSATE SODIUM 100 MG: 100 CAPSULE, LIQUID FILLED ORAL at 15:09

## 2018-02-19 RX ADMIN — MAGNESIUM SULFATE HEPTAHYDRATE 100 ML/HR: 500 INJECTION, SOLUTION INTRAMUSCULAR; INTRAVENOUS at 15:09

## 2018-02-19 RX ADMIN — LORAZEPAM 0.5 MG: 0.5 TABLET ORAL at 08:58

## 2018-02-19 RX ADMIN — CITALOPRAM 20 MG: 20 TABLET, FILM COATED ORAL at 08:58

## 2018-02-19 RX ADMIN — OXYCODONE HYDROCHLORIDE AND ACETAMINOPHEN 2 TABLET: 5; 325 TABLET ORAL at 19:23

## 2018-02-19 RX ADMIN — CALCIUM CARBONATE-VITAMIN D TAB 500 MG-200 UNIT 500 MG: 500-200 TAB at 20:19

## 2018-02-19 RX ADMIN — SODIUM CHLORIDE, POTASSIUM CHLORIDE, SODIUM LACTATE AND CALCIUM CHLORIDE 75 ML/HR: 600; 310; 30; 20 INJECTION, SOLUTION INTRAVENOUS at 07:30

## 2018-02-19 RX ADMIN — RIVAROXABAN 10 MG: 10 TABLET, FILM COATED ORAL at 19:23

## 2018-02-19 RX ADMIN — OXYCODONE HYDROCHLORIDE AND ACETAMINOPHEN 2 TABLET: 5; 325 TABLET ORAL at 15:09

## 2018-02-19 RX ADMIN — DONEPEZIL HYDROCHLORIDE 5 MG: 5 TABLET, FILM COATED ORAL at 20:19

## 2018-02-19 NOTE — PROGRESS NOTES
Orthopedic Progress Note      Patient: Alona Cisneros    YOB: 1935    Medical Record Number: [id number]    Attending Physician: Garcia Armstrong MD    Date of Admission: 2/17/2018  3:43 AM    Admitting Dx:  Closed displaced oblique fracture of shaft of right femur, initial encounter [S72.331A]  Fall from standing, initial encounter [W19.XXXA]    Status Post: RIGHT FEMUR PERIPROSTHETIC FRACTURE REPAIR OPEN REDUCTION INTERNAL FIXATION     Post Operative Day Number: 1    Current Problem List:   Patient Active Problem List   Diagnosis   • Degeneration of lumbar or lumbosacral intervertebral disc   • Lumbar neuritis   • Closed displaced oblique fracture of shaft of right femur   • Fall from standing   • Anxiety   • Hypertension   • Tremors of nervous system         Past Medical History:   Diagnosis Date   • Anxiety    • Degeneration of lumbar or lumbosacral intervertebral disc 6/1/2017   • Hypertension    • Low back pain    • Tremors of nervous system        SUBJECTIVE: 82 y.o.  female. Awake and alert.  Appears comfortable at present.     OBJECTIVE:   Vitals:    02/18/18 1914 02/18/18 2311 02/19/18 0337 02/19/18 0715   BP: 112/56 124/68 105/62 113/73   BP Location: Left arm Right arm Left arm Left arm   Patient Position: Lying Lying Lying Lying   Pulse: 95 95 76 92   Resp: 16 16 16 16   Temp: 98.4 °F (36.9 °C) 98.4 °F (36.9 °C) 98.6 °F (37 °C) 98.3 °F (36.8 °C)   TempSrc: Oral Oral Oral Oral   SpO2: 94% 95% 96% 93%   Weight:       Height:         I/O last 3 completed shifts:  In: 1960 [P.O.:460; I.V.:1500]  Out: 950 [Urine:800; Blood:150]    Current Medications:  Scheduled Meds:  amLODIPine 5 mg Oral Daily   calcium-vitamin D 500 mg Oral BID   citalopram 20 mg Oral Daily   docusate sodium 100 mg Oral BID   donepezil 5 mg Oral Nightly   LORazepam 0.5 mg Oral Daily   mirtazapine 15 mg Oral Nightly   IV Fluids 1000 mL + additives 100 mL/hr Intravenous Daily   multivitamin 1 tablet Oral Daily   polyethylene  glycol 17 g Oral Daily   primidone 100 mg Oral Daily   rivaroxaban 10 mg Oral Daily With Dinner     PRN Meds:.•  acetaminophen  •  acetaminophen  •  bisacodyl  •  HYDROcodone-acetaminophen  •  HYDROmorphone  •  magnesium hydroxide  •  melatonin  •  Morphine **AND** naloxone  •  ondansetron  •  ondansetron **OR** ondansetron ODT **OR** ondansetron  •  oxyCODONE-acetaminophen  •  oxyCODONE-acetaminophen  •  sodium chloride  •  sodium chloride  •  Insert peripheral IV **AND** sodium chloride    Diagnostic Tests:   Lab Results (last 24 hours)     Procedure Component Value Units Date/Time    Hemoglobin & Hematocrit, Blood [[id number]]  (Abnormal) Collected:  02/18/18 1236    Specimen:  Blood Updated:  02/18/18 1249     Hemoglobin 11.1 (L) g/dL      Hematocrit 35.4 (L) %     Protime-INR [[id number]]  (Abnormal) Collected:  02/18/18 1236    Specimen:  Blood Updated:  02/18/18 1304     Protime 14.4 (H) Seconds      INR 1.14 (H)    CBC & Differential [[id number]] Collected:  02/19/18 0321    Specimen:  Blood Updated:  02/19/18 0425    Narrative:       The following orders were created for panel order CBC & Differential.  Procedure                               Abnormality         Status                     ---------                               -----------         ------                     CBC Auto Differential[[id number]]        Abnormal            Final result                 Please view results for these tests on the individual orders.    CBC Auto Differential [[id number]]  (Abnormal) Collected:  02/19/18 0321    Specimen:  Blood Updated:  02/19/18 0425     WBC 7.68 10*3/mm3      RBC 2.98 (L) 10*6/mm3      Hemoglobin 9.4 (L) g/dL      Hematocrit 29.6 (L) %      MCV 99.3 (H) fL      MCH 31.5 pg      MCHC 31.8 (L) g/dL      RDW 14.3 (H) %      RDW-SD 51.4 fl      MPV 11.6 fL      Platelets 121 (L) 10*3/mm3      Neutrophil % 73.2 %      Lymphocyte % 14.7 (L) %      Monocyte % 10.7 %      Eosinophil % 0.8 %      Basophil % 0.3 %       Immature Grans % 0.3 %      Neutrophils, Absolute 5.63 10*3/mm3      Lymphocytes, Absolute 1.13 10*3/mm3      Monocytes, Absolute 0.82 10*3/mm3      Eosinophils, Absolute 0.06 10*3/mm3      Basophils, Absolute 0.02 10*3/mm3      Immature Grans, Absolute 0.02 10*3/mm3     Protime-INR [[id number]]  (Abnormal) Collected:  02/19/18 0321    Specimen:  Blood Updated:  02/19/18 0430     Protime 18.9 (H) Seconds      INR 1.61 (H)    Basic Metabolic Panel [[id number]]  (Abnormal) Collected:  02/19/18 0321    Specimen:  Blood Updated:  02/19/18 0441     Glucose 115 (H) mg/dL      BUN 15 mg/dL      Creatinine 0.56 (L) mg/dL      Sodium 137 mmol/L      Potassium 3.9 mmol/L      Chloride 100 mmol/L      CO2 28.2 mmol/L      Calcium 8.8 mg/dL      eGFR Non African Amer 104 mL/min/1.73      BUN/Creatinine Ratio 26.8 (H)     Anion Gap 8.8 mmol/L     Narrative:       The MDRD GFR formula is only valid for adults with stable renal function between ages 18 and 70.          PHYSICAL EXAM:  Right thigh dressing intact.  Does have small amount of drainage noted on distal dressing.  Moderate edema to right thigh.  Tends to keep RLE externally rotated.  Good motion and sensation to right foot and ankle.  Calf soft and nontender.   Encouraged her work on AROM ankle.  Also encouraged use of incentive spirometry.  HG 9.4.  Last BM Friday.  Will start Stool softeners and Miralax.       ASSESSMENT & PLAN: Patient lives in assisted living at [place].  She will be NWB 4-6 weeks and will need subacute rehab.    Will leave mcpherson in until able to get up in chair.    Continue Monitor HG.            Date: 2/19/2018    ABELINO Leon MD

## 2018-02-19 NOTE — CONSULTS
"Adult Nutrition  Assessment/PES    Patient Name:  Alona Cinseros  YOB: 1935  MRN: [id number]  Admit Date:  2/17/2018    Assessment Date:  2/19/2018    Comments:  Completed nutrition assessment triggered by nurse admission screen.          Reason for Assessment       02/19/18 1150    Reason for Assessment    Reason For Assessment/Visit nurse/nurse practitioner consult    Identified At Risk By Screening Criteria MST SCORE 2+    Diagnosis Diagnosis    Ortho Fracture   R femur                Anthropometrics       02/19/18 1152    Anthropometrics    RD Documented Weight on Admission 57.2 kg (126 lb 1.7 oz)    Ideal Body Weight (IBW)    % Ideal Body Weight Malnutrition 80-90% - mild deficit    Body Mass Index (BMI)    BMI Grade 17 - 19 low grade I            Labs/Tests/Procedures/Meds       02/19/18 1153    Labs/Tests/Procedures/Meds    Diagnostic Test/Procedure Review reviewed    Labs/Tests Review Reviewed;Creat;Glucose;Hgb Hct;Platlets    Medication Review Reviewed, pertinent;Laxative;Multivitamin;Other (comment)   calcium-vit D    Significant Vitals reviewed            Physical Findings       02/19/18 1154    Physical Findings/Assessment    Additional Documentation Physical Appearance (Group)    Physical Appearance    Skin surgical wound;other (see comments)   ics R hip; Yassine score 19            Estimated/Assessed Needs       02/19/18 1155    Calculation Measurements    Weight Used For Calculations 57.2 kg (126 lb 1.7 oz)    Height Used for Calculations 1.778 m (5' 10\")    Estimated/Assessed Energy Needs    Energy Need Method [place] Jeor    Age 82    RMR ([place]-St[place] Jeor Equation) 1122.25    Activity Factors ([place] St Jeor)  Out of bed, ambulatory  1.3    Total estimated needs ([place] St[place] Jeor) 1459    Estimated/Assessed Protein Needs    Weight Used for Protein Calculation 57.2 kg (126 lb 1.7 oz)    Protein (gm/kg) 1.0    1.0 Gm Protein (gm) 57.2    Estimated/Assessed Fluid Needs    Fluid Need " Method RDA method    RDA Method (mL)  1459            Nutrition Prescription Ordered       02/19/18 1157    Nutrition Prescription PO    Current PO Diet Regular            Evaluation of Received Nutrient/Fluid Intake       02/19/18 1157    PO Evaluation    Number of Days PO Intake Evaluated 2 days    Number of Meals 2    % PO Intake averaged 63%            Problem/Interventions:        Problem 1       02/19/18 1157    Nutrition Diagnoses Problem 1    Problem 1 Nutrition Appropriate for Condition at this Time    Etiology (related to) Medical Diagnosis    Ortho Fracture   s/p ORIF    Signs/Symptoms (evidenced by) Report/Observation    Reported/Observed By RN;MD                    Intervention Goal       02/19/18 1158    Intervention Goal    General Maintain nutrition    PO Tolerate PO;Increase intake;PO intake (%)    PO Intake % 75 %    Weight No significant weight loss            Nutrition Intervention       02/19/18 1158    Nutrition Intervention    RD/Tech Action Follow Tx progress;Care plan reviewd              Education/Evaluation       02/19/18 1158    Education    Education Will Instruct as appropriate    Monitor/Evaluation    Monitor Per protocol        Electronically signed by:  Ca Ferrer RD  02/19/18 11:58 AM

## 2018-02-19 NOTE — PROGRESS NOTES
Continued Stay Note  [facility]     Patient Name: Alona Cisneros  MRN: [id number]  Today's Date: 2/19/2018    Admit Date: 2/17/2018          Discharge Plan       02/19/18 1528    Case Management/Social Work Plan    Plan [facility]     Additional Comments F/u w/ pt and family, they would like to d/c to [facility]. Karen notified, [facility] will have a bed when pt is ready.               Discharge Codes     None            Lauryn David RN

## 2018-02-19 NOTE — PLAN OF CARE
Problem: Patient Care Overview (Adult)  Goal: Plan of Care Review  Outcome: Ongoing (interventions implemented as appropriate)   02/19/18 0247 02/19/18 1633   Coping/Psychosocial Response Interventions   Plan Of Care Reviewed With --  patient   Patient Care Overview   Progress improving --    Outcome Evaluation   Outcome Summary/Follow up Plan --  Patient up to chair per Staff, back to chair per PT. Drsg D/C/I - some dried drainage. Non WB at this time. Pain medications increased 1 - 2 tabs Q 4 hrs PRN for pain. SNF plan for discharge. CIWA was not an issue today.      Goal: Adult Individualization and Mutuality  Outcome: Ongoing (interventions implemented as appropriate)    Goal: Discharge Needs Assessment  Outcome: Ongoing (interventions implemented as appropriate)      Problem: Fall Risk (Adult)  Goal: Absence of Falls  Outcome: Ongoing (interventions implemented as appropriate)      Problem: Orthopaedic Fracture (Adult)  Goal: Signs and Symptoms of Listed Potential Problems Will be Absent or Manageable (Orthopaedic Fracture)  Outcome: Ongoing (interventions implemented as appropriate)      Problem: Pain, Acute (Adult)  Goal: Acceptable Pain Control/Comfort Level  Outcome: Ongoing (interventions implemented as appropriate)      Problem: Perioperative Period (Adult)  Goal: Signs and Symptoms of Listed Potential Problems Will be Absent or Manageable (Perioperative Period)  Outcome: Outcome(s) achieved Date Met: 02/19/18

## 2018-02-19 NOTE — PLAN OF CARE
Problem: Patient Care Overview (Adult)  Goal: Plan of Care Review  Outcome: Ongoing (interventions implemented as appropriate)   02/19/18 0247   Coping/Psychosocial Response Interventions   Plan Of Care Reviewed With patient   Patient Care Overview   Progress improving   Outcome Evaluation   Outcome Summary/Follow up Plan Pain well controlled with po pain medicine. Dressing dry and intact. F/C to BSD in place, to remove in the morning. Educated patient on the importance of BP monitoring r/t history of Hypertension.     Goal: Adult Individualization and Mutuality  Outcome: Ongoing (interventions implemented as appropriate)    Goal: Discharge Needs Assessment  Outcome: Ongoing (interventions implemented as appropriate)      Problem: Fall Risk (Adult)  Goal: Absence of Falls  Outcome: Ongoing (interventions implemented as appropriate)      Problem: Pain, Acute (Adult)  Goal: Acceptable Pain Control/Comfort Level  Outcome: Ongoing (interventions implemented as appropriate)

## 2018-02-19 NOTE — THERAPY TREATMENT NOTE
Acute Care - Physical Therapy Treatment Note  [facility]     Patient Name: Alona Cisneros  : 1935  MRN: [id number]  Today's Date: 2018  Onset of Illness/Injury or Date of Surgery Date: 18 (POD1 RIGHT FEMUR PERIPROSTHETIC FRACTURE REPAIR ORIF)  Date of Referral to PT: 18  Referring Physician: Miracle    Admit Date: 2018    Visit Dx:    ICD-10-CM ICD-9-CM   1. Closed displaced oblique fracture of shaft of right femur, initial encounter S72.331A 821.01   2. Fall from standing, initial encounter W19.XXXA E888.9   3. Impaired functional mobility, balance, gait, and endurance Z74.09 V49.89     Patient Active Problem List   Diagnosis   • Degeneration of lumbar or lumbosacral intervertebral disc   • Lumbar neuritis   • Closed displaced oblique fracture of shaft of right femur   • Fall from standing   • Anxiety   • Hypertension   • Tremors of nervous system   • Underweight BMI 18.1   • Osteoporosis with pathological fracture   • Alcohol withdrawal               Adult Rehabilitation Note       18 1620          Rehab Assessment/Intervention    Discipline physical therapist  -MA      Document Type therapy note (daily note)  -MA      Subjective Information agree to therapy;complains of;pain  -MA      Patient Effort, Rehab Treatment adequate  -MA      Symptoms Noted During/After Treatment fatigue;increased pain  -MA      Precautions/Limitations fall precautions;non-weight bearing status  -MA      Recorded by [MA] Iraida Conte, PT      Vital Signs    O2 Delivery Pre Treatment supplemental O2  -MA      O2 Delivery Post Treatment supplemental O2  -MA      Recorded by [MA] Iraida Conte, PT      Pain Assessment    Pain Assessment Pearl-Hirsch FACES  -MA      Pearl-Hirsch FACES Pain Rating 6  -MA      Pain Type Surgical pain  -MA      Pain Location Hip  -MA      Pain Orientation Right  -MA      Pain Intervention(s) Cold applied;Repositioned;Ambulation/increased activity;Rest  -MA       Response to Interventions tolerated  -MA      Recorded by [MA] Iraida Conte, PT      Vision Assessment/Intervention    Visual Impairment WFL  -MA      Recorded by [MA] Iraida Conte PT      Cognitive Assessment/Intervention    Current Cognitive/Communication Assessment functional  -MA      Orientation Status oriented to;person;place  -MA      Follows Commands/Answers Questions 75% of the time;able to follow multi-step instructions;needs cueing;needs increased time;needs repetition  -MA      Personal Safety mild impairment  -MA      Personal Safety Interventions fall prevention program maintained;gait belt;nonskid shoes/slippers when out of bed  -MA      Short/Long Term Memory requires constant cues  -MA      Recorded by [MA] Iraida Conte, PT      Mobility Assessment/Training    Extremity Weight-Bearing Status right lower extremity  -MA      Right Lower Extremity Weight-Bearing non weight-bearing  -MA      Recorded by [MA] Iraida Conte PT      Bed Mobility, Assessment/Treatment    Bed Mobility, Assistive Device --  -MA      Bed Mob, Supine to Sit, [place] --  -MA      Bed Mob, Sit to Supine, [place] --  -MA      Bed Mob, Sidelying to Sit, [place] --  -MA      Bed Mobility, Safety Issues --  -MA      Bed Mobility, Impairments --  -MA      Bed Mobility, Comment UIC upon PT arrival  -MA      Recorded by [MA] Iraida Conte PT      Transfer Assessment/Treatment    Transfers, Sit-Stand [place] moderate assist (50% patient effort);2 person assist required  -MA      Transfers, Stand-Sit [place] moderate assist (50% patient effort);2 person assist required  -MA      Transfers, Sit-Stand-Sit, Assist Device rolling walker   foot under R LE to assess WBing status  -MA      Transfer, Safety Issues weight-shifting ability decreased;loses balance backward;sequencing ability decreased  -MA      Transfer, Impairments pain;strength decreased;impaired balance  -MA      Transfer,  Comment Stood twice- tolerated standing 45s for first trial and 30s for 2nd trial  -MA      Recorded by [MA] Iraida Conte, PT      Gait Assessment/Treatment    Gait, [place] Level unable to perform  -MA      Recorded by [MA] Iraida Conte PT      Balance Skills Training    Sitting-Level of Assistance Contact guard  -MA      Sitting-Balance Support Feet supported  -MA      Standing-Level of Assistance x2;Minimum assistance  -MA      Static Standing Balance Support assistive device  -MA      Recorded by [MA] Iraida Conte PT      Therapy Exercises    Exercise Protocols --  -MA      Hip ORIF Exercises --  -MA      Recorded by [MA] Iraida Conte PT      Positioning and Restraints    Pre-Treatment Position sitting in chair/recliner  -MA      Post Treatment Position chair  -MA      In Bed notified nsg;sitting;call light within reach;encouraged to call for assist;with family/caregiver;legs elevated  -MA      Recorded by [MA] Iraida Conte PT        User Key  (r) = Recorded By, (t) = Taken By, (c) = Cosigned By    Initials Name Effective Dates    CHANNING Conte, PT 12/13/16 -                 IP PT Goals       02/19/18 0900          Bed Mobility PT LTG    Bed Mobility PT LTG, Date Established 02/19/18  -MA      Bed Mobility PT LTG, Time to Achieve 1 wk  -MA      Bed Mobility PT LTG, Activity Type all bed mobility  -MA      Bed Mobility PT LTG, [place] Level supervision required  -MA      Transfer Training PT LTG    Transfer Training PT LTG, Date Established 02/19/18  -MA      Transfer Training PT LTG, Time to Achieve 1 wk  -MA      Transfer Training PT LTG, Activity Type all transfers  -MA      Transfer Training PT LTG, [place] Level supervision required  -MA      Transfer Training PT LTG, Assist Device walker, rolling   maintaining NWBing status  -MA      Gait Training PT LTG    Gait Training Goal PT LTG, Date Established 02/19/18  -MA      Gait Training Goal PT LTG, Time  to Achieve 1 wk  -MA      Gait Training Goal PT LTG, [place] Level contact guard assist  -MA      Gait Training Goal PT LTG, Assist Device walker, rolling   maintaining NWBing status  -MA      Gait Training Goal PT LTG, Distance to Achieve 15  -MA        User Key  (r) = Recorded By, (t) = Taken By, (c) = Cosigned By    Initials Name Provider Type    CHANNING Conte PT Physical Therapist          Physical Therapy Education     Title: PT OT SLP Therapies (Active)     Topic: Physical Therapy (Active)     Point: Mobility training (Active)    Learning Progress Summary    Learner Readiness Method Response Comment Documented by Status   Patient Acceptance E NR  MA 02/19/18 0904 Active               Point: Home exercise program (Active)    Learning Progress Summary    Learner Readiness Method Response Comment Documented by Status   Patient Acceptance E NR  MA 02/19/18 0904 Active               Point: Body mechanics (Active)    Learning Progress Summary    Learner Readiness Method Response Comment Documented by Status   Patient Acceptance E NR  MA 02/19/18 0904 Active               Point: Precautions (Active)    Learning Progress Summary    Learner Readiness Method Response Comment Documented by Status   Patient Acceptance E NR  MA 02/19/18 0904 Active                      User Key     Initials Effective Dates Name Provider Type Discipline    MA 12/13/16 -  Iraida Conte PT Physical Therapist PT                    PT Recommendation and Plan  Anticipated Discharge Disposition: skilled nursing facility  Planned Therapy Interventions: balance training, bed mobility training, gait training, home exercise program, patient/family education, postural re-education, ROM (Range of Motion), strengthening, transfer training  PT Frequency: daily  Plan of Care Review  Plan Of Care Reviewed With: patient, family  Outcome Summary/Follow up Plan: Patient is a pleasant 82 y.o. female who presents with impaired functional  mobility secondary to sustaining a fall at home resulting in a R femur oblique fracture. POD1 RIGHT FEMUR PERIPROSTHETIC FRACTURE REPAIR ORIF- NWBing of R LE per orders. Patient lives in independent living at [facility] and uses a rollator for community ambulation. Assist x 2 required for all bed mobility and transfers. Stood twice and tolerated static standing of 15-20s while maintaining NWBing status. 7/10 pain to report. Patient will benefit from skilled PT services acutely to address deficits as able and improve level of independence. Would recommend SNU at this time for continued rehabilitation.          Outcome Measures       02/19/18 0900          How much help from another person do you currently need...    Turning from your back to your side while in flat bed without using bedrails? 2  -MA      Moving from lying on back to sitting on the side of a flat bed without bedrails? 2  -MA      Moving to and from a bed to a chair (including a wheelchair)? 2  -MA      Standing up from a chair using your arms (e.g., wheelchair, bedside chair)? 2  -MA      Climbing 3-5 steps with a railing? 1  -MA      To walk in hospital room? 1  -MA      AM-PAC 6 Clicks Score 10  -MA      Functional Assessment    Outcome Measure Options AM-PAC 6 Clicks Basic Mobility (PT)  -MA        User Key  (r) = Recorded By, (t) = Taken By, (c) = Cosigned By    Initials Name Provider Type    CHANNING Conte, AMELIA Physical Therapist           Time Calculation:         PT Charges       02/19/18 1643 02/19/18 0849 02/19/18 0823    Time Calculation    Start Time 1620  -MA 0808  -MA 0808  -MA    Stop Time 1640  -MA 0848  -MA     Time Calculation (min) 20 min  -MA 40 min  -MA     PT Received On 02/19/18  -MA 02/19/18  -MA     PT - Next Appointment 02/20/18  -MA 02/20/18  -MA     PT Goal Re-Cert Due Date  02/26/18  -MA       User Key  (r) = Recorded By, (t) = Taken By, (c) = Cosigned By    Initials Name Provider Type    CHANNING Conte, PT  Physical Therapist          Therapy Charges for Today     Code Description Service Date Service Provider Modifiers Qty    54096216123 HC PT EVAL MOD COMPLEXITY 2 2/19/2018 Iraida Conte, PT GP 1    24550222714 HC PT THER PROC EA 15 MIN 2/19/2018 Iraida Conte, PT GP 1    48036476754 HC PT THER PROC EA 15 MIN 2/19/2018 Iraida Conte, PT GP 1          PT G-Codes  Outcome Measure Options: AM-PAC 6 Clicks Basic Mobility (PT)    Iraida Conte PT  2/19/2018

## 2018-02-19 NOTE — PROGRESS NOTES
Name: Alona Cisneros ADMIT: 2018   : 1935  PCP: Pierre Ibrahim MD    MRN: [id number] LOS: 2 days   AGE/SEX: 82 y.o. female  ROOM: [facility]     Subjective   Subjective:  Symptoms:  Stable.  No shortness of breath or chest pain.    Diet:  Adequate intake.  No nausea.    Activity level: Impaired due to pain.    Pain:  She complains of pain that is moderate.  She reports pain is unchanged.  Pain is partially controlled and requiring pain medication.        Objective   Vital Signs  Temp:  [98.1 °F (36.7 °C)-98.6 °F (37 °C)] 98.1 °F (36.7 °C)  Heart Rate:  [76-98] 98  Resp:  [14-16] 16  BP: (105-124)/(56-74) 121/74  SpO2:  [92 %-97 %] 96 %  on  Flow (L/min):  [2] 2;   O2 Device: nasal cannula  Body mass index is 18.08 kg/(m^2).    Physical Exam   Constitutional: She is oriented to person, place, and time. She is cooperative. No distress.   Frail, elderly, poor muscle mass   Neck: No JVD present. No tracheal deviation present. No thyromegaly present.   Cardiovascular: Normal rate and regular rhythm.    No murmur heard.  Pulmonary/Chest: Effort normal and breath sounds normal. No respiratory distress.   Abdominal: Soft. Normal appearance and bowel sounds are normal. She exhibits no distension. There is no tenderness.   Neurological: She is alert and oriented to person, place, and time.   Skin: Skin is warm and dry. No rash noted.   Psychiatric: She has a normal mood and affect. Her behavior is normal.   Nursing note and vitals reviewed.      Results Review:       I reviewed the patient's new clinical results.    Results from last 7 days  Lab Units 18  0321 18  1236 18  0335 18  0433   WBC 10*3/mm3 7.68  --  4.77 4.38*   HEMOGLOBIN g/dL 9.4* 11.1* 11.4* 13.6   PLATELETS 10*3/mm3 121*  --  143 189     Results from last 7 days  Lab Units 18  0321 18  0335 18  0433   SODIUM mmol/L 137 138 141   POTASSIUM mmol/L 3.9 3.8 3.6   CHLORIDE mmol/L 100 103 102   CO2 mmol/L 28.2  27.1 27.3   BUN mg/dL 15 21 14   CREATININE mg/dL 0.56* 0.63 0.63   GLUCOSE mg/dL 115* 108* 96   Estimated Creatinine Clearance: 50.6 mL/min (by C-G formula based on Cr of 0.56).  Results from last 7 days  Lab Units 02/19/18  0321 02/18/18  0335 02/17/18  0433   CALCIUM mg/dL 8.8 7.9* 8.9   ALBUMIN g/dL  --   --  3.80       Results from last 7 days  Lab Units 02/19/18  0321 02/18/18  1236 02/17/18  0433   PROTIME Seconds 18.9* 14.4* 13.9   INR  1.61* 1.14* 1.09               amLODIPine 5 mg Oral Daily   calcium-vitamin D 500 mg Oral BID   citalopram 20 mg Oral Daily   docusate sodium 100 mg Oral BID   donepezil 5 mg Oral Nightly   LORazepam 0.5 mg Oral Daily   mirtazapine 15 mg Oral Nightly   IV Fluids 1000 mL + additives 100 mL/hr Intravenous Daily   multivitamin 1 tablet Oral Daily   polyethylene glycol 17 g Oral Daily   primidone 100 mg Oral Daily   rivaroxaban 10 mg Oral Daily With Dinner       lactated ringers 9 mL/hr Last Rate: 9 mL/hr (02/18/18 0732)   lactated ringers 75 mL/hr Last Rate: 75 mL/hr (02/19/18 0730)   sodium chloride 125 mL/hr Last Rate: Stopped (02/18/18 0730)         Assessment/Plan   Active Hospital Problems (** Indicates Principal Problem)    Diagnosis Date Noted   • **Closed displaced oblique fracture of shaft of right femur [S72.331A] 02/17/2018   • Underweight BMI 18.1 [R63.6] 02/19/2018   • Osteoporosis with pathological fracture [M80.00XA] 02/19/2018   • Alcohol withdrawal [F10.239] 02/19/2018   • Fall from standing [W19.XXXA] 02/17/2018   • Anxiety [F41.9] 02/17/2018   • Hypertension [I10] 02/17/2018   • Tremors of nervous system [R25.1] 02/17/2018   • Degeneration of lumbar or lumbosacral intervertebral disc [M51.37] 06/01/2017      Resolved Hospital Problems    Diagnosis Date Noted Date Resolved   No resolved problems to display.       Ms. Cisneros is a 82 y.o. female who is POD#1 RIGHT FEMUR PERIPROSTHETIC FRACTURE REPAIR ORIF .    · Expected drop in Hgb post fracture and surgery.  Continue to monitor. No need for transfusion at this time.  · LORTAB 5/325 mg PO q4h prn pain.    · Continue MiraLAX for constipation. Consider stronger laxative if no BM in 2 days.  · Ensure ordered.  · On XARELTO per ortho. Continue SCDs while inpatient.  · Physical therapy to improve mobility and range of motion.  · Patient encouraged to use incentive spirometer as instructed.  · According to family already takes calcium and vitamin D at home. Will check vitamin D level in the morning.  · Recommend bisphosphonate therapy for osteoporosis post discharge. Should also have bone densitometry to establish baseline and monitor treatment response.  Will defer this to the outpatient setting with PCP.   · Defer Meek catheter to orthopedics.       Disposition  · Will need SNU.      Garcia Armstrong MD  [facility]  02/19/18  12:18 PM

## 2018-02-19 NOTE — PROGRESS NOTES
Discharge Planning Assessment  [facility]     Patient Name: Alona Cisneros  MRN: [id number]  Today's Date: 2/19/2018    Admit Date: 2/17/2018          Discharge Needs Assessment       02/19/18 1006    Living Environment    Lives With facility resident    Living Arrangements independent living facility    Home Accessibility no concerns;bed and bath on same level    Stair Railings at Home none    Type of Financial/Environmental Concern none    Transportation Available family or friend will provide;car    Living Environment    Quality Of Family Relationships supportive    Able to Return to Prior Living Arrangements yes    Discharge Needs Assessment    Concerns To Be Addressed no discharge needs identified    Readmission Within The Last 30 Days no previous admission in last 30 days    Discharge Disposition skilled nursing facility            Discharge Plan       02/19/18 1007    Case Management/Social Work Plan    Plan Pending referrals to SNF     Patient/Family In Agreement With Plan yes    Additional Comments CCP met with patient, patient's daughter and son in law at bedside. (Paula Mon [phone] and Ilir [phone]). CCP role explained and discharge planning discussed. Face sheet verified and IMM noted. Patient lives in independent living at [facility]. Patient uses a cane and rollator for mobility. Patient is independent with her ADLS. Patient has used [facility] home health in the past. Patient and patient's daughter would like referrals to [facility] (1st choice) [facility] (2nd choice) and [facility] (3rd choice). Referrals made to [facility], [facility] and [facility]. Patient's PCP is Dr. Pierre Ibrahim. CCP will follow for pending referrals to SNF. Whitney IRAHETA.         Discharge Placement     Facility/Agency Request Status Selected? Address Phone Number Fax Number    [facility] Pending - Request Sent     [address] [address] [phone]     [facility] Pending - Request Sent     [address] [address] [phone]     [facility] Pending - Request Sent     [address] [address] [phone]                Demographic Summary       02/19/18 1004    Referral Information    Admission Type inpatient    Arrived From admitted as an inpatient    Referral Source admission list    Reason For Consult discharge planning    Record Reviewed history and physical;patient profile    Primary Care Physician Information    Name Pierre Ibrahim MD             Functional Status       02/19/18 1004    Functional Status Current    Ambulation 3-->assistive equipment and person    Transferring 3-->assistive equipment and person    Toileting 3-->assistive equipment and person    Bathing 3-->assistive equipment and person    Dressing 3-->assistive equipment and person    Eating 2-->assistive person    Communication 0-->understands/communicates without difficulty    Functional Status Prior    Ambulation 1-->assistive equipment    Transferring 1-->assistive equipment    Toileting 1-->assistive equipment    Bathing 1-->assistive equipment    Dressing 1-->assistive equipment    Eating 0-->independent    Communication 0-->understands/communicates without difficulty    IADL    Medications assistive equipment    Meal Preparation assistive equipment    Housekeeping assistive equipment    Laundry assistive equipment    Shopping assistive equipment    Oral Care independent    Activity Tolerance    Current Activity Limitations none    Cognitive/Perceptual/Developmental    Current Mental Status/Cognitive Functioning no deficits noted    Recent Changes in Mental Status/Cognitive Functioning no changes    Employment/Financial    Financial Concerns none            Psychosocial     None            Abuse/Neglect     None            Legal       02/19/18 1006    Legal    Legal Comments no POA/living will.  Emergency contact Paula Mon (daughter; [phone]).            Substance Abuse     None            Patient Forms       02/19/18 1016    Patient Forms    Provider Choice List Delivered    Delivered to Patient    Method of delivery In person          ESEQUIEL Hernandez

## 2018-02-19 NOTE — PLAN OF CARE
Problem: Patient Care Overview (Adult)  Goal: Plan of Care Review  Outcome: Ongoing (interventions implemented as appropriate)   02/18/18 1139 02/18/18 1949   Coping/Psychosocial Response Interventions   Plan Of Care Reviewed With patient --    Patient Care Overview   Progress --  progress towards functional goals is fair   Outcome Evaluation   Outcome Summary/Follow up Plan --  Pt s/p right femur ORIF. Pt has some confusion post-op. Pt denies pain at this time. Vitals WNL, dressing CDI. Will continue to monitor bp r/t history of HTN.     Goal: Adult Individualization and Mutuality  Outcome: Ongoing (interventions implemented as appropriate)    Goal: Discharge Needs Assessment  Outcome: Ongoing (interventions implemented as appropriate)      Problem: Fall Risk (Adult)  Goal: Absence of Falls  Outcome: Outcome(s) achieved Date Met: 02/18/18    Goal: Absence of Falls  Outcome: Ongoing (interventions implemented as appropriate)      Problem: Orthopaedic Fracture (Adult)  Goal: Signs and Symptoms of Listed Potential Problems Will be Absent or Manageable (Orthopaedic Fracture)  Outcome: Ongoing (interventions implemented as appropriate)      Problem: Pain, Acute (Adult)  Goal: Acceptable Pain Control/Comfort Level  Outcome: Ongoing (interventions implemented as appropriate)      Problem: Pressure Ulcer Risk (Yassine Scale) (Adult,Obstetrics,Pediatric)  Goal: Skin Integrity  Outcome: Ongoing (interventions implemented as appropriate)      Problem: Perioperative Period (Adult)  Goal: Signs and Symptoms of Listed Potential Problems Will be Absent or Manageable (Perioperative Period)  Outcome: Ongoing (interventions implemented as appropriate)

## 2018-02-19 NOTE — PLAN OF CARE
Problem: Patient Care Overview (Adult)  Goal: Plan of Care Review   02/19/18 0900   Coping/Psychosocial Response Interventions   Plan Of Care Reviewed With patient;family   Outcome Evaluation   Outcome Summary/Follow up Plan Patient is a pleasant 82 y.o. female who presents with impaired functional mobility secondary to sustaining a fall at home resulting in a R femur oblique fracture. POD1 RIGHT FEMUR PERIPROSTHETIC FRACTURE REPAIR ORIF- NWBing of R LE per orders. Patient lives in independent living at [facility] and uses a rollator for community ambulation. Assist x 2 required for all bed mobility and transfers. Stood twice and tolerated static standing of 15-20s while maintaining NWBing status. 7/10 pain to report. Patient will benefit from skilled PT services acutely to address deficits as able and improve level of independence. Would recommend SNU at this time for continued rehabilitation.       Problem: Inpatient Physical Therapy  Goal: Bed Mobility Goal LTG- PT   02/19/18 0900   Bed Mobility PT LTG   Bed Mobility PT LTG, Date Established 02/19/18   Bed Mobility PT LTG, Time to Achieve 1 wk   Bed Mobility PT LTG, Activity Type all bed mobility   Bed Mobility PT LTG, [place] Level supervision required     Goal: Transfer Training Goal 1 LTG- PT   02/19/18 0900   Transfer Training PT LTG   Transfer Training PT LTG, Date Established 02/19/18   Transfer Training PT LTG, Time to Achieve 1 wk   Transfer Training PT LTG, Activity Type all transfers   Transfer Training PT LTG, [place] Level supervision required   Transfer Training PT LTG, Assist Device walker, rolling  (maintaining NWBing status)     Goal: Gait Training Goal LTG- PT   02/19/18 0900   Gait Training PT LTG   Gait Training Goal PT LTG, Date Established 02/19/18   Gait Training Goal PT LTG, Time to Achieve 1 wk   Gait Training Goal PT LTG, [place] Level contact guard assist   Gait Training Goal PT LTG, Assist Device walker,  rolling  (maintaining NWBing status)   Gait Training Goal PT LTG, Distance to Achieve 15

## 2018-02-19 NOTE — DISCHARGE PLACEMENT REQUEST
"Alona Cisneros (82 y.o. Female)     Date of Birth Social Security Number Address Home Phone MRN    1935  [id number] [facility] [phone] [id number]    Taoism Marital Status          Rastafari        Admission Date Admission Type Admitting Provider Attending Provider Department, Room/Bed    2/17/18 Emergency Bobo Finley MD Ray, Jonathan, MD [address], P780/1    Discharge Date Discharge Disposition Discharge Destination                      Attending Provider: Garcia Armstrong MD     Allergies:  No Known Allergies    Isolation:  None   Infection:  None   Code Status:  FULL    Ht:  177.8 cm (70\")   Wt:  57.2 kg (126 lb)    Admission Cmt:  None   Principal Problem:  Closed displaced oblique fracture of shaft of right femur [S72.331A]                 Active Insurance as of 2/17/2018     Primary Coverage     Payor Plan Insurance Group Employer/Plan Group    MEDICARE MEDICARE A & B      Payor Plan Address Payor Plan Phone Number Effective From Effective To    [address] [phone] 12/1/2002     [place] [address]       Subscriber Name Subscriber Birth Date Member ID       ALONA CISNEROS 1935 [id number]           Secondary Coverage     Payor Plan Insurance Group Employer/Plan Group    [facility] SUPP [id number]     Payor Plan Address Payor Plan Phone Number Effective From Effective To    [address]  12/1/2016     [place] [address]       Subscriber Name Subscriber Birth Date Member ID       ALONA CISNEROS 1935 [id number]                 Emergency Contacts      (Rel.) Home Phone Work Phone Mobile Phone    Jannette Mon (Daughter) [phone] -- --    AmeliaDanielito (Son) -- -- [phone]    Josette Guardaod (Daughter) -- -- [phone]              "

## 2018-02-20 LAB
25(OH)D3 SERPL-MCNC: 31.8 NG/ML (ref 30–100)
ANION GAP SERPL CALCULATED.3IONS-SCNC: 7.6 MMOL/L
BASOPHILS # BLD AUTO: 0.03 10*3/MM3 (ref 0–0.2)
BASOPHILS NFR BLD AUTO: 0.5 % (ref 0–1.5)
BUN BLD-MCNC: 13 MG/DL (ref 8–23)
BUN/CREAT SERPL: 23.2 (ref 7–25)
CALCIUM SPEC-SCNC: 7.9 MG/DL (ref 8.6–10.5)
CHLORIDE SERPL-SCNC: 103 MMOL/L (ref 98–107)
CO2 SERPL-SCNC: 29.4 MMOL/L (ref 22–29)
CREAT BLD-MCNC: 0.56 MG/DL (ref 0.57–1)
DEPRECATED RDW RBC AUTO: 50.5 FL (ref 37–54)
EOSINOPHIL # BLD AUTO: 0.15 10*3/MM3 (ref 0–0.7)
EOSINOPHIL NFR BLD AUTO: 2.5 % (ref 0.3–6.2)
ERYTHROCYTE [DISTWIDTH] IN BLOOD BY AUTOMATED COUNT: 14.3 % (ref 11.7–13)
GFR SERPL CREATININE-BSD FRML MDRD: 104 ML/MIN/1.73
GLUCOSE BLD-MCNC: 101 MG/DL (ref 65–99)
HCT VFR BLD AUTO: 27.9 % (ref 35.6–45.5)
HGB BLD-MCNC: 8.8 G/DL (ref 11.9–15.5)
IMM GRANULOCYTES # BLD: 0.02 10*3/MM3 (ref 0–0.03)
IMM GRANULOCYTES NFR BLD: 0.3 % (ref 0–0.5)
LYMPHOCYTES # BLD AUTO: 0.91 10*3/MM3 (ref 0.9–4.8)
LYMPHOCYTES NFR BLD AUTO: 14.9 % (ref 19.6–45.3)
MCH RBC QN AUTO: 30.8 PG (ref 26.9–32)
MCHC RBC AUTO-ENTMCNC: 31.5 G/DL (ref 32.4–36.3)
MCV RBC AUTO: 97.6 FL (ref 80.5–98.2)
MONOCYTES # BLD AUTO: 0.65 10*3/MM3 (ref 0.2–1.2)
MONOCYTES NFR BLD AUTO: 10.7 % (ref 5–12)
NEUTROPHILS # BLD AUTO: 4.33 10*3/MM3 (ref 1.9–8.1)
NEUTROPHILS NFR BLD AUTO: 71.1 % (ref 42.7–76)
PLATELET # BLD AUTO: 126 10*3/MM3 (ref 140–500)
PMV BLD AUTO: 11.9 FL (ref 6–12)
POTASSIUM BLD-SCNC: 3.8 MMOL/L (ref 3.5–5.2)
RBC # BLD AUTO: 2.86 10*6/MM3 (ref 3.9–5.2)
SODIUM BLD-SCNC: 140 MMOL/L (ref 136–145)
WBC NRBC COR # BLD: 6.09 10*3/MM3 (ref 4.5–10.7)

## 2018-02-20 PROCEDURE — 80048 BASIC METABOLIC PNL TOTAL CA: CPT | Performed by: HOSPITALIST

## 2018-02-20 PROCEDURE — 82306 VITAMIN D 25 HYDROXY: CPT | Performed by: HOSPITALIST

## 2018-02-20 PROCEDURE — 27507 TREATMENT OF THIGH FRACTURE: CPT | Performed by: ORTHOPAEDIC SURGERY

## 2018-02-20 PROCEDURE — 85025 COMPLETE CBC W/AUTO DIFF WBC: CPT | Performed by: HOSPITALIST

## 2018-02-20 PROCEDURE — L1833 KO ADJ JNT POS R SUP PRE OTS: HCPCS

## 2018-02-20 PROCEDURE — 99024 POSTOP FOLLOW-UP VISIT: CPT | Performed by: ORTHOPAEDIC SURGERY

## 2018-02-20 PROCEDURE — 97110 THERAPEUTIC EXERCISES: CPT

## 2018-02-20 RX ORDER — HYDROCODONE BITARTRATE AND ACETAMINOPHEN 5; 325 MG/1; MG/1
1 TABLET ORAL EVERY 4 HOURS PRN
Status: DISCONTINUED | OUTPATIENT
Start: 2018-02-20 | End: 2018-02-21 | Stop reason: HOSPADM

## 2018-02-20 RX ORDER — HYDROCODONE BITARTRATE AND ACETAMINOPHEN 5; 325 MG/1; MG/1
2 TABLET ORAL EVERY 4 HOURS PRN
Status: DISCONTINUED | OUTPATIENT
Start: 2018-02-20 | End: 2018-02-21 | Stop reason: HOSPADM

## 2018-02-20 RX ADMIN — Medication 1 TABLET: at 07:41

## 2018-02-20 RX ADMIN — OXYCODONE HYDROCHLORIDE AND ACETAMINOPHEN 1 TABLET: 5; 325 TABLET ORAL at 11:33

## 2018-02-20 RX ADMIN — MIRTAZAPINE 15 MG: 15 TABLET, FILM COATED ORAL at 21:51

## 2018-02-20 RX ADMIN — DOCUSATE SODIUM 100 MG: 100 CAPSULE, LIQUID FILLED ORAL at 07:41

## 2018-02-20 RX ADMIN — LORAZEPAM 0.5 MG: 0.5 TABLET ORAL at 07:41

## 2018-02-20 RX ADMIN — DOCUSATE SODIUM 100 MG: 100 CAPSULE, LIQUID FILLED ORAL at 21:51

## 2018-02-20 RX ADMIN — HYDROCODONE BITARTRATE AND ACETAMINOPHEN 1 TABLET: 5; 325 TABLET ORAL at 17:37

## 2018-02-20 RX ADMIN — DONEPEZIL HYDROCHLORIDE 5 MG: 5 TABLET, FILM COATED ORAL at 21:51

## 2018-02-20 RX ADMIN — SODIUM CHLORIDE, POTASSIUM CHLORIDE, SODIUM LACTATE AND CALCIUM CHLORIDE 75 ML/HR: 600; 310; 30; 20 INJECTION, SOLUTION INTRAVENOUS at 07:28

## 2018-02-20 RX ADMIN — RIVAROXABAN 10 MG: 10 TABLET, FILM COATED ORAL at 17:37

## 2018-02-20 RX ADMIN — CALCIUM CARBONATE-VITAMIN D TAB 500 MG-200 UNIT 500 MG: 500-200 TAB at 21:51

## 2018-02-20 RX ADMIN — HYDROCODONE BITARTRATE AND ACETAMINOPHEN 1 TABLET: 5; 325 TABLET ORAL at 18:14

## 2018-02-20 RX ADMIN — PRIMIDONE 100 MG: 50 TABLET ORAL at 07:41

## 2018-02-20 RX ADMIN — HYDROCODONE BITARTRATE AND ACETAMINOPHEN 2 TABLET: 5; 325 TABLET ORAL at 22:56

## 2018-02-20 RX ADMIN — AMLODIPINE BESYLATE 5 MG: 5 TABLET ORAL at 07:41

## 2018-02-20 RX ADMIN — CITALOPRAM 20 MG: 20 TABLET, FILM COATED ORAL at 07:40

## 2018-02-20 RX ADMIN — CALCIUM CARBONATE-VITAMIN D TAB 500 MG-200 UNIT 500 MG: 500-200 TAB at 07:41

## 2018-02-20 RX ADMIN — POLYETHYLENE GLYCOL 3350 17 G: 17 POWDER, FOR SOLUTION ORAL at 07:42

## 2018-02-20 RX ADMIN — OXYCODONE HYDROCHLORIDE AND ACETAMINOPHEN 1 TABLET: 5; 325 TABLET ORAL at 07:41

## 2018-02-20 NOTE — PROGRESS NOTES
Orthopedic Progress Note      Patient: Alona Cisneros    YOB: 1935    Medical Record Number: [id number]    Attending Physician: Garcia Armstrong MD    Date of Admission: 2/17/2018  3:43 AM    Admitting Dx:  Closed displaced oblique fracture of shaft of right femur, initial encounter [S72.331A]  Fall from standing, initial encounter [W19.XXXA]    Status Post: RIGHT FEMUR PERIPROSTHETIC FRACTURE REPAIR OPEN REDUCTION INTERNAL FIXATION     Post Operative Day Number: 2    Current Problem List:   Patient Active Problem List   Diagnosis   • Degeneration of lumbar or lumbosacral intervertebral disc   • Lumbar neuritis   • Closed displaced oblique fracture of shaft of right femur   • Fall from standing   • Anxiety   • Hypertension   • Tremors of nervous system   • Underweight BMI 18.1   • Osteoporosis with pathological fracture   • Alcohol withdrawal         Past Medical History:   Diagnosis Date   • Anxiety    • Degeneration of lumbar or lumbosacral intervertebral disc 6/1/2017   • Hypertension    • Low back pain    • Tremors of nervous system        SUBJECTIVE: 82 y.o.  female . Awake and alert.  Better today.    OBJECTIVE:   Vitals:    02/20/18 0254 02/20/18 0735 02/20/18 1048 02/20/18 1529   BP: 125/69 140/72 129/69 114/66   BP Location: Right arm Right arm Right arm Right arm   Patient Position: Lying Lying Lying Sitting   Pulse: 88 79 85 86   Resp: 16 18 20 18   Temp: 96.8 °F (36 °C) 100.5 °F (38.1 °C) 98.6 °F (37 °C) 97.7 °F (36.5 °C)   TempSrc: Oral Oral Oral Oral   SpO2: 95% 95% 97% 98%   Weight:       Height:         I/O last 3 completed shifts:  In: 3179 [P.O.:1180; I.V.:1999]  Out: 3850 [Urine:3850]    Current Medications:  Scheduled Meds:  amLODIPine 5 mg Oral Daily   calcium-vitamin D 500 mg Oral BID   citalopram 20 mg Oral Daily   docusate sodium 100 mg Oral BID   donepezil 5 mg Oral Nightly   LORazepam 0.5 mg Oral Daily   mirtazapine 15 mg Oral Nightly   multivitamin 1 tablet Oral Daily    polyethylene glycol 17 g Oral Daily   primidone 100 mg Oral Daily   rivaroxaban 10 mg Oral Daily With Dinner     PRN Meds:.•  acetaminophen  •  acetaminophen  •  bisacodyl  •  HYDROcodone-acetaminophen  •  HYDROcodone-acetaminophen  •  LORazepam  •  magnesium hydroxide  •  melatonin  •  ondansetron  •  ondansetron **OR** ondansetron ODT **OR** ondansetron  •  sodium chloride  •  Insert peripheral IV **AND** sodium chloride    Diagnostic Tests:   Lab Results (last 24 hours)     Procedure Component Value Units Date/Time    CBC & Differential [[id number]] Collected:  02/20/18 0445    Specimen:  Blood Updated:  02/20/18 0514    Narrative:       The following orders were created for panel order CBC & Differential.  Procedure                               Abnormality         Status                     ---------                               -----------         ------                     CBC Auto Differential[[id number]]        Abnormal            Final result                 Please view results for these tests on the individual orders.    CBC Auto Differential [[id number]]  (Abnormal) Collected:  02/20/18 0445    Specimen:  Blood Updated:  02/20/18 0514     WBC 6.09 10*3/mm3      RBC 2.86 (L) 10*6/mm3      Hemoglobin 8.8 (L) g/dL      Hematocrit 27.9 (L) %      MCV 97.6 fL      MCH 30.8 pg      MCHC 31.5 (L) g/dL      RDW 14.3 (H) %      RDW-SD 50.5 fl      MPV 11.9 fL      Platelets 126 (L) 10*3/mm3      Neutrophil % 71.1 %      Lymphocyte % 14.9 (L) %      Monocyte % 10.7 %      Eosinophil % 2.5 %      Basophil % 0.5 %      Immature Grans % 0.3 %      Neutrophils, Absolute 4.33 10*3/mm3      Lymphocytes, Absolute 0.91 10*3/mm3      Monocytes, Absolute 0.65 10*3/mm3      Eosinophils, Absolute 0.15 10*3/mm3      Basophils, Absolute 0.03 10*3/mm3      Immature Grans, Absolute 0.02 10*3/mm3     Basic Metabolic Panel [[id number]]  (Abnormal) Collected:  02/20/18 0445    Specimen:  Blood Updated:  02/20/18 0537     Glucose 101  (H) mg/dL      BUN 13 mg/dL      Creatinine 0.56 (L) mg/dL      Sodium 140 mmol/L      Potassium 3.8 mmol/L      Chloride 103 mmol/L      CO2 29.4 (H) mmol/L      Calcium 7.9 (L) mg/dL      eGFR Non African Amer 104 mL/min/1.73      BUN/Creatinine Ratio 23.2     Anion Gap 7.6 mmol/L     Narrative:       The MDRD GFR formula is only valid for adults with stable renal function between ages 18 and 70.    Vitamin D 25 Hydroxy [[id number]]  (Normal) Collected:  02/20/18 0445    Specimen:  Blood Updated:  02/20/18 0557     25 Hydroxy, Vitamin D 31.8 ng/ml     Narrative:       Reference Range for Total Vitamin D 25(OH)     Deficiency    <20.0 ng/mL   Insufficiency 21-29 ng/mL   Sufficiency    ng/mL  Toxicity      >100 ng/ml               PHYSICAL EXAM:  Right thigh dressing remains intact with old bloody drainage.  Calf and thigh remain soft and nontender.  Pain well controlled.  Hg 8.8.  Patient asymptomatic when OOB.  Will continue to monitor     ASSESSMENT & PLAN:  Will plan to fit the patient with T-Scope knee brace locked in full extension.  Patient will remain strict NWB for minimum of 6 weeks.  Continue to work on strengthening exercises and bed to chair transfers.  Plan transfer to rehab when OK with medicine.            Date: 2/20/2018    ABELINO Leon MD

## 2018-02-20 NOTE — OP NOTE
HIP FRACTURE - PERCUTANEOUS SCREW FIXATION:    PATIENT NAME: Alona Cisneros  PREOPERATIVE DIAGNOSIS: Periprosthetic right femoral shaft fracture.  SECONDARY DIAGNOSIS: Displaced, comminuted right periprosthetic shaft fracture  POSTOPERATIVE DIAGNOSIS: Post-Op Diagnosis Codes:   Same  PROCEDURE PERFORMED: Open reduction and locking plate fixation along with cables and bone grafting of femur fracture.   SURGEON: MARY CHRISTIAN M.D.  ASSISTANT: VIK Nielsen.  The assistance of my assistant was required in safe positioning of the patient and retracting the tissues while the implants were being placed.  ANESTHESIOLOGIST: Dr. Infante  ANESTHESIA USED: Gen. with an LMA  ANALGESIC PROCEDURE USED: 30 cc of half percent Naropin plain was infiltrated into the incision  ESTIMATED BLOOD LOSS: 150 mL  SPECIMENS: * No orders in the log *  IMPLANTS USED: A 12 hole lateral locking plate from [employer] with 4 cables to augment the fixation and Cerament bone graft   DRAINS: None  COMPLICATIONS: None  POSITION: Lateral on [place] table.    NOTE: C-arm was used through the entire procedure for gauging the adequacy of the reduction of the fracture and to ensure appropriate positioning of the implants and to prevent any implant related complications.     INDICATIONS: Patient sustained an injury to the Femur which led to the development of a periprosthetic fracture.  The patient has had a prior total hip arthroplasty performed by  in 2015.  The comminuted fracture of the femur was just distal to the stem and did not appear to compromise the stability of the prosthesis.  She also admits to being a fairly constant consumer of alcohol to the extent of 5-6 ounces every day.  There has been an extensive discussion about modalities of treatment with the patient. At this point, because of the nature of the fracture and its relatively displaced position and also the fact that internal fixation would be a less invasive procedure than  replacement  arthroplasty, it was decided to proceed with plate and screw stabilization of the fracture. Patient understands completely and in great detail the risks of death, infection, myocardial infarction, DVT, pulmonary embolism, delayed union, nonunion, malunion, AVN, possibility of revison surgery including but not limited to removal of screws and revision to partial or total hip arthroplasty. Issues with regards to delayed healing, limb length discrepancy, shortening, limp and limited range of motion all discussed with the patient. Patient understands and accepts.     PROCEDURE: Patient was given antibiotics per SCIP protocol. Patient was placed in a lateral position on a [place] table. C-arm was used through the entire procedure to assess the adequacy of the fracture reduction and position of the implants. The hip and femur were prepped and draped in a standard fashion. A shower curtain drape was applied. A lateral approach was carried out to the base of the greater trochanter and to the lateral shaft of the femur. The fascia marcelino was incised.  The vastus lateralis was split.  The fracture was extensively comminuted.  The fracture was brought out to length.  The distal cement plug and a small amount of bone cement were removed from the intramedullary canal of the proximal fragment to allow anatomic reduction of the proximal to the distal fragments.  A 12 hole plate was applied to the lateral aspect of the femur.  Proximally the locking screws were unicortical because of the placement of the cemented femoral stem.  Distally locking screws were used.  A total of 4 cables were used, 3 proximally and 2 distally to augment the stability of fixation of the plates to the shaft of the femur.  Intraoperative x-ray imaging showed a satisfactory reduction of the fracture and good position of the implants.  The area of extensive comminution around the mid diaphysis of the femur was then bone grafted using tricalcium  phosphate-Cerament- bone graft material.      Hemostasis was obtained Wounds were lavaged with Bacitracin irrigating solution. The entire construct was extremely stable. The fascia was repaired with interrupted sutures. Subcuticular sutures were applied.  Staples were applied to the skin incision We placed 30cc half percent Naropin plain locally for postoperative analgesia. Patient tolerated the procedure well. Patient was reversed from anesthetic and taken from the operating room to the recovery room in stable, satisfactory condition. I discussed the satisfactory performance of the procedure with the patient’s family and answered all questions for them. I showed them the C-arm pictures.    Nima Rausch MD  2/20/2018  7:02 AM

## 2018-02-20 NOTE — THERAPY TREATMENT NOTE
Acute Care - Physical Therapy Treatment Note  [facility]     Patient Name: Alona Cisneros  : 1935  MRN: [id number]  Today's Date: 2018  Onset of Illness/Injury or Date of Surgery Date: 18 (POD1 RIGHT FEMUR PERIPROSTHETIC FRACTURE REPAIR ORIF)  Date of Referral to PT: 18  Referring Physician: Miracle    Admit Date: 2018    Visit Dx:    ICD-10-CM ICD-9-CM   1. Closed displaced oblique fracture of shaft of right femur, initial encounter S72.331A 821.01   2. Fall from standing, initial encounter W19.XXXA E888.9   3. Impaired functional mobility, balance, gait, and endurance Z74.09 V49.89     Patient Active Problem List   Diagnosis   • Degeneration of lumbar or lumbosacral intervertebral disc   • Lumbar neuritis   • Closed displaced oblique fracture of shaft of right femur   • Fall from standing   • Anxiety   • Hypertension   • Tremors of nervous system   • Underweight BMI 18.1   • Osteoporosis with pathological fracture   • Alcohol withdrawal               Adult Rehabilitation Note       18 1500 18 1100 18 1620    Rehab Assessment/Intervention    Discipline physical therapy assistant  -CW physical therapy assistant  -CW physical therapist  -MA    Document Type therapy note (daily note)  -CW therapy note (daily note)  -CW therapy note (daily note)  -MA    Subjective Information agree to therapy;complains of;weakness;pain  -CW agree to therapy;complains of;weakness;pain  -CW agree to therapy;complains of;pain  -MA    Patient Effort, Rehab Treatment adequate  -CW adequate  -CW adequate  -MA    Symptoms Noted During/After Treatment   fatigue;increased pain  -MA    Precautions/Limitations fall precautions;non-weight bearing status  -CW fall precautions;non-weight bearing status  -CW fall precautions;non-weight bearing status  -MA    Recorded by [CW] Colt Finn PTA [CW] Colt Finn PTA [MA] Iraida Conte PT    Vital Signs    O2 Delivery Pre Treatment room  air  -CW room air  -CW supplemental O2  -MA    O2 Delivery Intra Treatment  room air  -CW     O2 Delivery Post Treatment  room air  -CW supplemental O2  -MA    Recorded by [CW] Colt Finn PTA [CW] Colt Finn PTA [MA] Iraida Conte, PT    Pain Assessment    Pain Assessment 0-10  -CW 0-10  -CW Pearl-Baker FACES  -MA    Pearl-Hirsch FACES Pain Rating   6  -MA    Pain Score 6  -CW 4  -CW     Post Pain Score 6  -CW 4  -CW     Pain Type Surgical pain  -CW Surgical pain  -CW Surgical pain  -MA    Pain Location Hip  -CW Hip  -CW Hip  -MA    Pain Orientation Right  -CW Right  -CW Right  -MA    Pain Intervention(s) Repositioned;Ambulation/increased activity  -CW Repositioned;Ambulation/increased activity  -CW Cold applied;Repositioned;Ambulation/increased activity;Rest  -MA    Response to Interventions claire  -CW claire  -CW tolerated  -MA    Recorded by [CW] Colt Finn PTA [CW] Colt Finn PTA [MA] Iraida Conte, PT    Vision Assessment/Intervention    Visual Impairment   WFL  -MA    Recorded by   [MA] Iraida Conte, PT    Cognitive Assessment/Intervention    Current Cognitive/Communication Assessment functional  -CW functional  -CW functional  -MA    Orientation Status oriented to;person;place  -CW oriented to;person;place  -CW oriented to;person;place  -MA    Follows Commands/Answers Questions 100% of the time;able to follow single-step instructions;needs cueing  -% of the time;able to follow single-step instructions;needs cueing  -CW 75% of the time;able to follow multi-step instructions;needs cueing;needs increased time;needs repetition  -MA    Personal Safety mild impairment;decreased awareness, need for assist;decreased awareness, need for safety;decreased insight to deficits  -CW mild impairment;decreased awareness, need for assist;decreased awareness, need for safety;decreased insight to deficits  -CW mild impairment  -MA    Personal Safety Interventions fall prevention  program maintained;gait belt;muscle strengthening facilitated;nonskid shoes/slippers when out of bed  -CW fall prevention program maintained;gait belt;muscle strengthening facilitated;nonskid shoes/slippers when out of bed  -CW fall prevention program maintained;gait belt;nonskid shoes/slippers when out of bed  -MA    Short/Long Term Memory requires constant cues  -CW requires constant cues  -CW requires constant cues  -MA    Recorded by [CW] Colt Finn PTA [CW] Colt Finn PTA [MA] Iraida Conte, PT    Mobility Assessment/Training    Extremity Weight-Bearing Status   right lower extremity  -MA    Right Lower Extremity Weight-Bearing non weight-bearing  -CW non weight-bearing  -CW non weight-bearing  -MA    Recorded by [CW] Colt Finn PTA [CW] Colt Finn PTA [MA] Iraida Conte, PT    Bed Mobility, Assessment/Treatment    Bed Mobility, Assistive Device   --  -MA    Bed Mob, Supine to Sit, [place]  moderate assist (50% patient effort)  -CW --  -MA    Bed Mob, Sit to Supine, [place] not tested  -CW not tested  -CW --  -MA    Bed Mob, Sidelying to Sit, [place]   --  -MA    Bed Mobility, Safety Issues   --  -MA    Bed Mobility, Impairments   --  -MA    Bed Mobility, Comment up in chair  -CW up to chair  -CW UIC upon PT arrival  -MA    Recorded by [CW] Colt Finn, NINA [CW] Colt Finn, NINA [MA] Iraida Conte, PT    Transfer Assessment/Treatment    Transfers, Sit-Stand [place] moderate assist (50% patient effort);2 person assist required  -CW moderate assist (50% patient effort);2 person assist required  -CW moderate assist (50% patient effort);2 person assist required  -MA    Transfers, Stand-Sit [place] moderate assist (50% patient effort);2 person assist required  -CW moderate assist (50% patient effort);2 person assist required  -CW moderate assist (50% patient effort);2 person assist required  -MA    Transfers, Sit-Stand-Sit,  Assist Device rolling walker  -CW rolling walker  -CW rolling walker   foot under R LE to assess WBing status  -MA    Transfer, Maintain Weight Bearing Status  assist to maintain weight bearing status  -CW     Transfer, Safety Issues weight-shifting ability decreased;loses balance backward;sequencing ability decreased  -CW weight-shifting ability decreased;loses balance backward;sequencing ability decreased  -CW weight-shifting ability decreased;loses balance backward;sequencing ability decreased  -MA    Transfer, Impairments pain;strength decreased;impaired balance  -CW pain;strength decreased;impaired balance  -CW pain;strength decreased;impaired balance  -MA    Transfer, Comment   Stood twice- tolerated standing 45s for first trial and 30s for 2nd trial  -MA    Recorded by [CW] Colt Finn PTA [CW] Colt Finn PTA [MA] Iraida Conte, PT    Gait Assessment/Treatment    Gait, Becker Level   unable to perform  -MA    Recorded by   [MA] Iraida Conte, PT    Balance Skills Training    Sitting-Level of Assistance   Contact guard  -MA    Sitting-Balance Support   Feet supported  -MA    Standing-Level of Assistance   x2;Minimum assistance  -MA    Static Standing Balance Support   assistive device  -MA    Recorded by   [MA] Iraida Conte, PT    Therapy Exercises    Exercise Protocols hip ORIF  -CW hip ORIF  -CW --  -MA    Hip ORIF Exercises right:;15 reps;completed protocol;with assist  -CW right:;15 reps;completed protocol;with assist  -CW --  -MA    Recorded by [CW] Colt Finn, PTA [CW] Colt Finn PTA [MA] Iraida Conte, PT    Positioning and Restraints    Pre-Treatment Position sitting in chair/recliner  -CW in bed  -CW sitting in chair/recliner  -MA    Post Treatment Position chair  -CW chair  -CW chair  -MA    In Bed   notified nsg;sitting;call light within reach;encouraged to call for assist;with family/caregiver;legs elevated  -MA    In Chair notified  nsg;reclined;call light within reach;encouraged to call for assist;with family/caregiver  -CW notified nsg;reclined;call light within reach;encouraged to call for assist;with family/caregiver  -CW     Recorded by [CW] Colt Finn, NINA [CW] Colt Finn, NINA [MA] Iraida Conte PT      User Key  (r) = Recorded By, (t) = Taken By, (c) = Cosigned By    Initials Name Effective Dates    CHANNING Conte, PT 12/13/16 -     CW Colt Finn PTA 12/13/16 -                 IP PT Goals       02/19/18 0900          Bed Mobility PT LTG    Bed Mobility PT LTG, Date Established 02/19/18  -MA      Bed Mobility PT LTG, Time to Achieve 1 wk  -MA      Bed Mobility PT LTG, Activity Type all bed mobility  -MA      Bed Mobility PT LTG, [place] Level supervision required  -MA      Transfer Training PT LTG    Transfer Training PT LTG, Date Established 02/19/18  -MA      Transfer Training PT LTG, Time to Achieve 1 wk  -MA      Transfer Training PT LTG, Activity Type all transfers  -MA      Transfer Training PT LTG, [place] Level supervision required  -MA      Transfer Training PT LTG, Assist Device walker, rolling   maintaining NWBing status  -MA      Gait Training PT LTG    Gait Training Goal PT LTG, Date Established 02/19/18  -MA      Gait Training Goal PT LTG, Time to Achieve 1 wk  -MA      Gait Training Goal PT LTG, [place] Level contact guard assist  -MA      Gait Training Goal PT LTG, Assist Device walker, rolling   maintaining [place] status  -MA      Gait Training Goal PT LTG, Distance to Achieve 15  -MA        User Key  (r) = Recorded By, (t) = Taken By, (c) = Cosigned By    Initials Name Provider Type    CHANNING Conte, PT Physical Therapist          Physical Therapy Education     Title: PT OT SLP Therapies (Active)     Topic: Physical Therapy (Active)     Point: Mobility training (Active)    Learning Progress Summary    Learner Readiness Method Response Comment Documented by  Status   Patient Acceptance E,TB NR   02/20/18 1136 Active    Acceptance E NR  MA 02/19/18 0904 Active   Family Acceptance E,TB DU,VU  CW 02/20/18 1136 Done               Point: Home exercise program (Active)    Learning Progress Summary    Learner Readiness Method Response Comment Documented by Status   Patient Acceptance E,TB NR   02/20/18 1136 Active    Acceptance E NR  MA 02/19/18 0904 Active   Family Acceptance E,TB DU,VU  CW 02/20/18 1136 Done               Point: Body mechanics (Active)    Learning Progress Summary    Learner Readiness Method Response Comment Documented by Status   Patient Acceptance E,TB NR   02/20/18 1136 Active    Acceptance E NR  MA 02/19/18 0904 Active   Family Acceptance E,TB DU,VU  CW 02/20/18 1136 Done               Point: Precautions (Active)    Learning Progress Summary    Learner Readiness Method Response Comment Documented by Status   Patient Acceptance E,TB NR   02/20/18 1136 Active    Acceptance E NR  MA 02/19/18 0904 Active   Family Acceptance E,TB DU,VU   02/20/18 1136 Done                      User Key     Initials Effective Dates Name Provider Type Discipline    MA 12/13/16 -  Iraida Conte, PT Physical Therapist PT     12/13/16 -  Colt Finn, PTA Physical Therapy Assistant PT                    PT Recommendation and Plan  Anticipated Discharge Disposition: skilled nursing facility  Planned Therapy Interventions: balance training, bed mobility training, gait training, home exercise program, patient/family education, postural re-education, ROM (Range of Motion), strengthening, transfer training  PT Frequency: daily  Plan of Care Review  Plan Of Care Reviewed With: patient  Progress: progress towards functional goals is fair  Outcome Summary/Follow up Plan: Pt able to improve with bed mobility but requires vc's for WB and transfer safety to chair          Outcome Measures       02/20/18 1100 02/19/18 0900       How much help from another person do you  currently need...    Turning from your back to your side while in flat bed without using bedrails? 2  -CW 2  -MA     Moving from lying on back to sitting on the side of a flat bed without bedrails? 2  -CW 2  -MA     Moving to and from a bed to a chair (including a wheelchair)? 2  -CW 2  -MA     Standing up from a chair using your arms (e.g., wheelchair, bedside chair)? 2  -CW 2  -MA     Climbing 3-5 steps with a railing? 1  -CW 1  -MA     To walk in hospital room? 1  -CW 1  -MA     AM-PAC 6 Clicks Score 10  -CW 10  -MA     Functional Assessment    Outcome Measure Options AM-PAC 6 Clicks Basic Mobility (PT)  -CW AM-PAC 6 Clicks Basic Mobility (PT)  -MA       User Key  (r) = Recorded By, (t) = Taken By, (c) = Cosigned By    Initials Name Provider Type    CHANNING Conte, PT Physical Therapist    CW Colt Finn PTA Physical Therapy Assistant           Time Calculation:         PT Charges       02/20/18 1517 02/20/18 1139       Time Calculation    Start Time 1500  -CW 1122  -CW     Stop Time 1517  -CW 1139  -CW     Time Calculation (min) 17 min  -CW 17 min  -CW     PT Received On 02/20/18  -CW 02/20/18  -CW     PT - Next Appointment 02/21/18  -CW 02/20/18  -CW       User Key  (r) = Recorded By, (t) = Taken By, (c) = Cosigned By    Initials Name Provider Type    CW Cotl Finn PTA Physical Therapy Assistant          Therapy Charges for Today     Code Description Service Date Service Provider Modifiers Qty    25573283000 HC PT THER PROC EA 15 MIN 2/20/2018 Colt Finn PTA GP 1    63312378041 HC PT THER PROC EA 15 MIN 2/20/2018 Colt Finn PTA GP 1          PT G-Codes  Outcome Measure Options: AM-PAC 6 Clicks Basic Mobility (PT)    Colt Finn PTA  2/20/2018

## 2018-02-20 NOTE — PROGRESS NOTES
Name: Alona Cisneros ADMIT: 2018   : 1935  PCP: Pierre Ibrahim MD    MRN: [id number] LOS: 3 days   AGE/SEX: 82 y.o. female  ROOM: [facility]     Subjective   Subjective:  Symptoms:  Stable.  No shortness of breath or chest pain.    Diet:  Adequate intake.  No nausea.    Activity level: Impaired due to pain.    Pain:  She complains of pain that is moderate.  She reports pain is unchanged.  Pain is partially controlled and requiring pain medication.    Struggling with PT somewhat.    Objective   Vital Signs  Temp:  [96.8 °F (36 °C)-100.5 °F (38.1 °C)] 100.5 °F (38.1 °C)  Heart Rate:  [75-98] 79  Resp:  [16-18] 18  BP: ()/(57-74) 140/72  SpO2:  [95 %-98 %] 95 %  on  Flow (L/min):  [2] 2;   O2 Device: room air  Body mass index is 18.08 kg/(m^2).    Physical Exam   Constitutional: She is oriented to person, place, and time. She is cooperative. No distress.   Frail, elderly, poor muscle mass   Neck: No JVD present. No tracheal deviation present. No thyromegaly present.   Cardiovascular: Normal rate and regular rhythm.    No murmur heard.  Pulmonary/Chest: Effort normal and breath sounds normal. No respiratory distress.   Abdominal: Soft. Normal appearance and bowel sounds are normal. She exhibits no distension. There is no tenderness.   Neurological: She is alert and oriented to person, place, and time.   Skin: Skin is warm and dry. No rash noted.   Psychiatric: She has a normal mood and affect. Her behavior is normal.   Nursing note and vitals reviewed.      Results Review:       I reviewed the patient's new clinical results.    Results from last 7 days  Lab Units 18  0445 18  0321 18  1236 18  0335 18  0433   WBC 10*3/mm3 6.09 7.68  --  4.77 4.38*   HEMOGLOBIN g/dL 8.8* 9.4* 11.1* 11.4* 13.6   PLATELETS 10*3/mm3 126* 121*  --  143 189       Results from last 7 days  Lab Units 18  0445 18  0321 18  0335 18  0433   SODIUM mmol/L 140 137 138 141    POTASSIUM mmol/L 3.8 3.9 3.8 3.6   CHLORIDE mmol/L 103 100 103 102   CO2 mmol/L 29.4* 28.2 27.1 27.3   BUN mg/dL 13 15 21 14   CREATININE mg/dL 0.56* 0.56* 0.63 0.63   GLUCOSE mg/dL 101* 115* 108* 96   Estimated Creatinine Clearance: 50.6 mL/min (by C-G formula based on Cr of 0.56).    Results from last 7 days  Lab Units 02/20/18  0445 02/19/18  0321 02/18/18  0335 02/17/18  0433   CALCIUM mg/dL 7.9* 8.8 7.9* 8.9   ALBUMIN g/dL  --   --   --  3.80       Results from last 7 days  Lab Units 02/20/18 0445   VIT D 25 HYDROXY ng/ml 31.8         amLODIPine 5 mg Oral Daily   calcium-vitamin D 500 mg Oral BID   citalopram 20 mg Oral Daily   docusate sodium 100 mg Oral BID   donepezil 5 mg Oral Nightly   LORazepam 0.5 mg Oral Daily   mirtazapine 15 mg Oral Nightly   IV Fluids 1000 mL + additives 100 mL/hr Intravenous Daily   multivitamin 1 tablet Oral Daily   polyethylene glycol 17 g Oral Daily   primidone 100 mg Oral Daily   rivaroxaban 10 mg Oral Daily With Dinner       lactated ringers 75 mL/hr Last Rate: 75 mL/hr (02/20/18 0728)         Assessment/Plan   Active Hospital Problems (** Indicates Principal Problem)    Diagnosis Date Noted   • **Closed displaced oblique fracture of shaft of right femur [S72.331A] 02/17/2018   • Underweight BMI 18.1 [R63.6] 02/19/2018   • Osteoporosis with pathological fracture [M80.00XA] 02/19/2018   • Alcohol withdrawal [F10.239] 02/19/2018   • Fall from standing [W19.XXXA] 02/17/2018   • Anxiety [F41.9] 02/17/2018   • Hypertension [I10] 02/17/2018   • Tremors of nervous system [R25.1] 02/17/2018   • Degeneration of lumbar or lumbosacral intervertebral disc [M51.37] 06/01/2017      Resolved Hospital Problems    Diagnosis Date Noted Date Resolved   No resolved problems to display.       Ms. Cisneros is a 82 y.o. female who is POD#2 RIGHT FEMUR PERIPROSTHETIC FRACTURE REPAIR ORIF .    · Expected drop in Hgb post fracture and surgery. Continue to monitor. No need for transfusion at this  time.  · PERCOCET 5/325 mg 1-2 PO q4h prn pain.    · Continue MiraLAX for constipation. Consider stronger laxative if no BM in 2 days.  · Ensure ordered.  · On XARELTO per ortho. Continue SCDs while inpatient.  · Physical therapy to improve mobility and range of motion.  · Patient encouraged to use incentive spirometer as instructed.  · Vitamin D level normal. Continue preadmit Ca++/Vit D replacement meds.  · Recommend bisphosphonate therapy for osteoporosis post discharge. Should also have bone densitometry to establish baseline and monitor treatment response.  Will defer this to the outpatient setting with PCP.   · Fantasma is out.       Disposition  · [facility] at discharge. Presumably tomorrow. Discussed with ortho.      Garcia Armstrong MD  [facility]  02/20/18  8:26 AM

## 2018-02-20 NOTE — THERAPY TREATMENT NOTE
Acute Care - Physical Therapy Treatment Note  [facility]     Patient Name: Alona Cisneros  : 1935  MRN: [id number]  Today's Date: 2018  Onset of Illness/Injury or Date of Surgery Date: 18 (POD1 RIGHT FEMUR PERIPROSTHETIC FRACTURE REPAIR ORIF)  Date of Referral to PT: 18  Referring Physician: Miracle    Admit Date: 2018    Visit Dx:    ICD-10-CM ICD-9-CM   1. Closed displaced oblique fracture of shaft of right femur, initial encounter S72.331A 821.01   2. Fall from standing, initial encounter W19.XXXA E888.9   3. Impaired functional mobility, balance, gait, and endurance Z74.09 V49.89     Patient Active Problem List   Diagnosis   • Degeneration of lumbar or lumbosacral intervertebral disc   • Lumbar neuritis   • Closed displaced oblique fracture of shaft of right femur   • Fall from standing   • Anxiety   • Hypertension   • Tremors of nervous system   • Underweight BMI 18.1   • Osteoporosis with pathological fracture   • Alcohol withdrawal               Adult Rehabilitation Note       18 1100 18 1620       Rehab Assessment/Intervention    Discipline physical therapy assistant  -CW physical therapist  -MA     Document Type therapy note (daily note)  -CW therapy note (daily note)  -MA     Subjective Information agree to therapy;complains of;weakness;pain  -CW agree to therapy;complains of;pain  -MA     Patient Effort, Rehab Treatment adequate  -CW adequate  -MA     Symptoms Noted During/After Treatment  fatigue;increased pain  -MA     Precautions/Limitations fall precautions;non-weight bearing status  -CW fall precautions;non-weight bearing status  -MA     Recorded by [CW] Colt Finn, NINA [MA] Iraida Conte, PT     Vital Signs    O2 Delivery Pre Treatment room air  -CW supplemental O2  -MA     O2 Delivery Intra Treatment room air  -CW      O2 Delivery Post Treatment room air  -CW supplemental O2  -MA     Recorded by [CW] Colt Finn PTA [MA] Iraida HILL  Dg, PT     Pain Assessment    Pain Assessment 0-10  -CW Pearl-Hirsch FACES  -MA     Pearl-Hirsch FACES Pain Rating  6  -MA     Pain Score 4  -CW      Post Pain Score 4  -CW      Pain Type Surgical pain  -CW Surgical pain  -MA     Pain Location Hip  -CW Hip  -MA     Pain Orientation Right  -CW Right  -MA     Pain Intervention(s) Repositioned;Ambulation/increased activity  -CW Cold applied;Repositioned;Ambulation/increased activity;Rest  -MA     Response to Interventions claire  -CW tolerated  -MA     Recorded by [CW] Colt Finn PTA [MA] Iraida Conte, PT     Vision Assessment/Intervention    Visual Impairment  WFL  -MA     Recorded by  [MA] Iraida Conte, PT     Cognitive Assessment/Intervention    Current Cognitive/Communication Assessment functional  -CW functional  -MA     Orientation Status oriented to;person;place  -CW oriented to;person;place  -MA     Follows Commands/Answers Questions 100% of the time;able to follow single-step instructions;needs cueing  -CW 75% of the time;able to follow multi-step instructions;needs cueing;needs increased time;needs repetition  -MA     Personal Safety mild impairment;decreased awareness, need for assist;decreased awareness, need for safety;decreased insight to deficits  -CW mild impairment  -MA     Personal Safety Interventions fall prevention program maintained;gait belt;muscle strengthening facilitated;nonskid shoes/slippers when out of bed  -CW fall prevention program maintained;gait belt;nonskid shoes/slippers when out of bed  -MA     Short/Long Term Memory requires constant cues  -CW requires constant cues  -MA     Recorded by [CW] Colt Finn PTA [MA] Iraida Conte, PT     Mobility Assessment/Training    Extremity Weight-Bearing Status  right lower extremity  -MA     Right Lower Extremity Weight-Bearing non weight-bearing  -CW non weight-bearing  -MA     Recorded by [CW] Colt Finn PTA [MA] Iraida Conte, PT     Bed  Mobility, Assessment/Treatment    Bed Mobility, Assistive Device  --  -MA     Bed Mob, Supine to Sit, Rooks moderate assist (50% patient effort)  -CW --  -MA     Bed Mob, Sit to Supine, Rooks not tested  -CW --  -MA     Bed Mob, Sidelying to Sit, Rooks  --  -MA     Bed Mobility, Safety Issues  --  -MA     Bed Mobility, Impairments  --  -MA     Bed Mobility, Comment up to chair  -CW UIC upon PT arrival  -MA     Recorded by [CW] Colt Finn PTA [MA] Iraida Conte, PT     Transfer Assessment/Treatment    Transfers, Sit-Stand Rooks moderate assist (50% patient effort);2 person assist required  -CW moderate assist (50% patient effort);2 person assist required  -MA     Transfers, Stand-Sit Rooks moderate assist (50% patient effort);2 person assist required  -CW moderate assist (50% patient effort);2 person assist required  -MA     Transfers, Sit-Stand-Sit, Assist Device rolling walker  -CW rolling walker   foot under R LE to assess WBing status  -MA     Transfer, Maintain Weight Bearing Status assist to maintain weight bearing status  -CW      Transfer, Safety Issues weight-shifting ability decreased;loses balance backward;sequencing ability decreased  -CW weight-shifting ability decreased;loses balance backward;sequencing ability decreased  -MA     Transfer, Impairments pain;strength decreased;impaired balance  -CW pain;strength decreased;impaired balance  -MA     Transfer, Comment  Stood twice- tolerated standing 45s for first trial and 30s for 2nd trial  -MA     Recorded by [CW] Colt Finn PTA [MA] Iraida Conte, PT     Gait Assessment/Treatment    Gait, Rooks Level  unable to perform  -MA     Recorded by  [MA] Iraida Conte, PT     Balance Skills Training    Sitting-Level of Assistance  Contact guard  -MA     Sitting-Balance Support  Feet supported  -MA     Standing-Level of Assistance  x2;Minimum assistance  -MA     Static Standing Balance  Support  assistive device  -MA     Recorded by  [MA] Iraida Conte PT     Therapy Exercises    Exercise Protocols hip ORIF  -CW --  -MA     Hip ORIF Exercises right:;15 reps;completed protocol;with assist  -CW --  -MA     Recorded by [CW] Colt Finn PTA [MA] Iraida Conte PT     Positioning and Restraints    Pre-Treatment Position in bed  -CW sitting in chair/recliner  -MA     Post Treatment Position chair  -CW chair  -MA     In Bed  notified nsg;sitting;call light within reach;encouraged to call for assist;with family/caregiver;legs elevated  -MA     In Chair notified nsg;reclined;call light within reach;encouraged to call for assist;with family/caregiver  -CW      Recorded by [CW] Colt Finn PTA [MA] Iraida Conte PT       User Key  (r) = Recorded By, (t) = Taken By, (c) = Cosigned By    Initials Name Effective Dates    CHANNING Conte, PT 12/13/16 -     CW Colt Finn PTA 12/13/16 -                 IP PT Goals       02/19/18 0900          Bed Mobility PT LTG    Bed Mobility PT LTG, Date Established 02/19/18  -MA      Bed Mobility PT LTG, Time to Achieve 1 wk  -MA      Bed Mobility PT LTG, Activity Type all bed mobility  -MA      Bed Mobility PT LTG, Ceiba Level supervision required  -MA      Transfer Training PT LTG    Transfer Training PT LTG, Date Established 02/19/18  -MA      Transfer Training PT LTG, Time to Achieve 1 wk  -MA      Transfer Training PT LTG, Activity Type all transfers  -MA      Transfer Training PT LTG, Ceiba Level supervision required  -MA      Transfer Training PT LTG, Assist Device walker, rolling   maintaining NWBing status  -MA      Gait Training PT LTG    Gait Training Goal PT LTG, Date Established 02/19/18  -MA      Gait Training Goal PT LTG, Time to Achieve 1 wk  -MA      Gait Training Goal PT LTG, Ceiba Level contact guard assist  -MA      Gait Training Goal PT LTG, Assist Device walker, rolling   maintaining  NWBing status  -MA      Gait Training Goal PT LTG, Distance to Achieve 15  -MA        User Key  (r) = Recorded By, (t) = Taken By, (c) = Cosigned By    Initials Name Provider Type    CHANNING Conte, PT Physical Therapist          Physical Therapy Education     Title: PT OT SLP Therapies (Active)     Topic: Physical Therapy (Active)     Point: Mobility training (Active)    Learning Progress Summary    Learner Readiness Method Response Comment Documented by Status   Patient Acceptance E,TB NR   02/20/18 1136 Active    Acceptance E NR  MA 02/19/18 0904 Active   Family Acceptance E,TB DU,VU   02/20/18 1136 Done               Point: Home exercise program (Active)    Learning Progress Summary    Learner Readiness Method Response Comment Documented by Status   Patient Acceptance E,TB NR   02/20/18 1136 Active    Acceptance E NR  MA 02/19/18 0904 Active   Family Acceptance E,TB DU,VU   02/20/18 1136 Done               Point: Body mechanics (Active)    Learning Progress Summary    Learner Readiness Method Response Comment Documented by Status   Patient Acceptance E,TB NR   02/20/18 1136 Active    Acceptance E NR  MA 02/19/18 0904 Active   Family Acceptance E,TB DU,VU   02/20/18 1136 Done               Point: Precautions (Active)    Learning Progress Summary    Learner Readiness Method Response Comment Documented by Status   Patient Acceptance E,TB NR   02/20/18 1136 Active    Acceptance E NR  MA 02/19/18 0904 Active   Family Acceptance E,TB DU,VU   02/20/18 1136 Done                      User Key     Initials Effective Dates Name Provider Type Discipline    MA 12/13/16 -  Iraida Conte, PT Physical Therapist PT     12/13/16 -  Colt Finn, PTA Physical Therapy Assistant PT                    PT Recommendation and Plan  Anticipated Discharge Disposition: skilled nursing facility  Planned Therapy Interventions: balance training, bed mobility training, gait training, home exercise program,  patient/family education, postural re-education, ROM (Range of Motion), strengthening, transfer training  PT Frequency: daily  Plan of Care Review  Plan Of Care Reviewed With: patient  Progress: progress towards functional goals is fair  Outcome Summary/Follow up Plan: Pt able to improve with bed mobility but requires vc's for WB and transfer safety to chair          Outcome Measures       02/20/18 1100 02/19/18 0900       How much help from another person do you currently need...    Turning from your back to your side while in flat bed without using bedrails? 2  -CW 2  -MA     Moving from lying on back to sitting on the side of a flat bed without bedrails? 2  -CW 2  -MA     Moving to and from a bed to a chair (including a wheelchair)? 2  -CW 2  -MA     Standing up from a chair using your arms (e.g., wheelchair, bedside chair)? 2  -CW 2  -MA     Climbing 3-5 steps with a railing? 1  -CW 1  -MA     To walk in hospital room? 1  -CW 1  -MA     AM-PAC 6 Clicks Score 10  -CW 10  -MA     Functional Assessment    Outcome Measure Options AM-PAC 6 Clicks Basic Mobility (PT)  -CW AM-PAC 6 Clicks Basic Mobility (PT)  -MA       User Key  (r) = Recorded By, (t) = Taken By, (c) = Cosigned By    Initials Name Provider Type    CHANNING Conte, PT Physical Therapist    CW Colt Finn PTA Physical Therapy Assistant           Time Calculation:         PT Charges       02/20/18 1139          Time Calculation    Start Time 1122  -CW      Stop Time 1139  -CW      Time Calculation (min) 17 min  -CW      PT Received On 02/20/18  -CW      PT - Next Appointment 02/20/18  -CW        User Key  (r) = Recorded By, (t) = Taken By, (c) = Cosigned By    Initials Name Provider Type    PHILIP Finn PTA Physical Therapy Assistant          Therapy Charges for Today     Code Description Service Date Service Provider Modifiers Qty    88621049816 HC PT THER PROC EA 15 MIN 2/20/2018 Colt Finn PTA GP 1          PT  G-Codes  Outcome Measure Options: AM-PAC 6 Clicks Basic Mobility (PT)    Colt Finn, PTA  2/20/2018

## 2018-02-20 NOTE — NURSING NOTE
Has patterned  mottled area to rt thigh, family states has had as a result of pre-admit long term heating pad use

## 2018-02-20 NOTE — PLAN OF CARE
Problem: Patient Care Overview (Adult)  Goal: Plan of Care Review  Outcome: Ongoing (interventions implemented as appropriate)   02/20/18 0429   Coping/Psychosocial Response Interventions   Plan Of Care Reviewed With patient   Patient Care Overview   Progress improving   Outcome Evaluation   Outcome Summary/Follow up Plan patient able to stand and pivot with max assist x2 to [facility], patient voiding since cath removal, pain controlled at this time, patient educated on b/p monitoring     Goal: Discharge Needs Assessment  Outcome: Ongoing (interventions implemented as appropriate)      Problem: Fall Risk (Adult)  Goal: Absence of Falls  Outcome: Ongoing (interventions implemented as appropriate)      Problem: Orthopaedic Fracture (Adult)  Goal: Signs and Symptoms of Listed Potential Problems Will be Absent or Manageable (Orthopaedic Fracture)  Outcome: Ongoing (interventions implemented as appropriate)      Problem: Pressure Ulcer Risk (Yassine Scale) (Adult,Obstetrics,Pediatric)  Goal: Skin Integrity  Outcome: Ongoing (interventions implemented as appropriate)

## 2018-02-20 NOTE — PLAN OF CARE
Problem: Patient Care Overview (Adult)  Goal: Plan of Care Review  Outcome: Ongoing (interventions implemented as appropriate)   02/20/18 1137   Coping/Psychosocial Response Interventions   Plan Of Care Reviewed With patient   Patient Care Overview   Progress progress towards functional goals is fair   Outcome Evaluation   Outcome Summary/Follow up Plan Pt able to improve with bed mobility but requires vc's for WB and transfer safety to chair

## 2018-02-20 NOTE — PLAN OF CARE
Problem: Patient Care Overview (Adult)  Goal: Plan of Care Review  Outcome: Ongoing (interventions implemented as appropriate)  Pt doing well today. Pain is controlled with PO pain medication. Medication changed R/T increased drowsiness per family request. Pt able to participate with therapy. Pivot to chair. KI/brace in place. Pt voiding per BSC without difficulty. Family remains at bedside. Dressing C/D/I. VSS, NVI. Possible D/C to SNU in am. Demonstrated use of IS. Educated pt on the importance of blood pressure monitoring and the use of medications as ordered for H/O HTN. Will monitor.    02/20/18 1324   Coping/Psychosocial Response Interventions   Plan Of Care Reviewed With patient   Patient Care Overview   Progress progress towards functional goals is fair     Goal: Adult Individualization and Mutuality  Outcome: Ongoing (interventions implemented as appropriate)    Goal: Discharge Needs Assessment  Outcome: Ongoing (interventions implemented as appropriate)      Problem: Fall Risk (Adult)  Goal: Identify Related Risk Factors and Signs and Symptoms  Outcome: Ongoing (interventions implemented as appropriate)    Goal: Absence of Falls  Outcome: Ongoing (interventions implemented as appropriate)      Problem: Orthopaedic Fracture (Adult)  Goal: Signs and Symptoms of Listed Potential Problems Will be Absent or Manageable (Orthopaedic Fracture)  Outcome: Ongoing (interventions implemented as appropriate)      Problem: Pressure Ulcer Risk (Yassine Scale) (Adult,Obstetrics,Pediatric)  Goal: Identify Related Risk Factors and Signs and Symptoms  Outcome: Ongoing (interventions implemented as appropriate)    Goal: Skin Integrity  Outcome: Ongoing (interventions implemented as appropriate)

## 2018-02-20 NOTE — NURSING NOTE
Noted at shift change mcpherson catheter not present at shift change and #20 g iv not present at shift change. Also pt has mottled appearing area to left thigh in addition to right thigh, family states pt has had pre-admit and is due to chronic heating pad use.

## 2018-02-21 VITALS
BODY MASS INDEX: 18.04 KG/M2 | TEMPERATURE: 98.3 F | SYSTOLIC BLOOD PRESSURE: 128 MMHG | RESPIRATION RATE: 18 BRPM | WEIGHT: 126 LBS | HEIGHT: 70 IN | DIASTOLIC BLOOD PRESSURE: 70 MMHG | HEART RATE: 73 BPM | OXYGEN SATURATION: 98 %

## 2018-02-21 PROCEDURE — 99024 POSTOP FOLLOW-UP VISIT: CPT | Performed by: ORTHOPAEDIC SURGERY

## 2018-02-21 RX ORDER — PSEUDOEPHEDRINE HCL 30 MG
100 TABLET ORAL 2 TIMES DAILY
Start: 2018-02-21 | End: 2020-02-26

## 2018-02-21 RX ORDER — POLYETHYLENE GLYCOL 3350 17 G/17G
17 POWDER, FOR SOLUTION ORAL DAILY
Start: 2018-02-22 | End: 2020-02-26

## 2018-02-21 RX ORDER — LORAZEPAM 0.5 MG/1
0.5 TABLET ORAL DAILY
Qty: 5 TABLET | Refills: 0 | OUTPATIENT
Start: 2018-02-21 | End: 2019-01-20

## 2018-02-21 RX ORDER — HYDROCODONE BITARTRATE AND ACETAMINOPHEN 5; 325 MG/1; MG/1
TABLET ORAL
Qty: 60 TABLET | Refills: 0 | Status: SHIPPED | OUTPATIENT
Start: 2018-02-21 | End: 2018-11-16 | Stop reason: SDUPTHER

## 2018-02-21 RX ADMIN — POLYETHYLENE GLYCOL 3350 17 G: 17 POWDER, FOR SOLUTION ORAL at 08:45

## 2018-02-21 RX ADMIN — LORAZEPAM 0.5 MG: 0.5 TABLET ORAL at 08:45

## 2018-02-21 RX ADMIN — AMLODIPINE BESYLATE 5 MG: 5 TABLET ORAL at 08:46

## 2018-02-21 RX ADMIN — DOCUSATE SODIUM 100 MG: 100 CAPSULE, LIQUID FILLED ORAL at 08:45

## 2018-02-21 RX ADMIN — HYDROCODONE BITARTRATE AND ACETAMINOPHEN 2 TABLET: 5; 325 TABLET ORAL at 14:41

## 2018-02-21 RX ADMIN — Medication 1 TABLET: at 08:45

## 2018-02-21 RX ADMIN — PRIMIDONE 100 MG: 50 TABLET ORAL at 08:45

## 2018-02-21 RX ADMIN — CALCIUM CARBONATE-VITAMIN D TAB 500 MG-200 UNIT 500 MG: 500-200 TAB at 08:45

## 2018-02-21 RX ADMIN — HYDROCODONE BITARTRATE AND ACETAMINOPHEN 2 TABLET: 5; 325 TABLET ORAL at 08:45

## 2018-02-21 RX ADMIN — CITALOPRAM 20 MG: 20 TABLET, FILM COATED ORAL at 08:45

## 2018-02-21 NOTE — PROGRESS NOTES
Case Management Discharge Note    Final Note: d/c to [facility], Karen notified. Family notified at the bedside.     Discharge Placement     Facility/Agency Request Status Selected? Address Phone Number Fax Number    [facility] [place] Accepted    [facility] [address] [facility] [address] [phone]    [facility] Pending - Request Sent     [address] [facility] [address] [phone]     [facility] Pending - Request Sent     [address] [facility] [place] [facility] [address] [phone]        Ambulance: [facility]    Discharge Codes: 03  Discharged/transferred to skilled nursing facility (SNF) with Medicare certification in anticipation of skilled care

## 2018-02-21 NOTE — PROGRESS NOTES
Orthopedic Progress Note      Patient: Alona Cisneros    YOB: 1935    Medical Record Number: [id number]    Attending Physician: Garcia Armstrong MD    Date of Admission: 2/17/2018  3:43 AM    Admitting Dx:  Closed displaced oblique fracture of shaft of right femur, initial encounter [S72.331A]  Fall from standing, initial encounter [W19.XXXA]    Status Post: RIGHT FEMUR PERIPROSTHETIC FRACTURE REPAIR OPEN REDUCTION INTERNAL FIXATION     Post Operative Day Number: 3    Current Problem List:   Patient Active Problem List   Diagnosis   • Degeneration of lumbar or lumbosacral intervertebral disc   • Lumbar neuritis   • Closed displaced oblique fracture of shaft of right femur   • Fall from standing   • Anxiety   • Hypertension   • Tremors of nervous system   • Underweight BMI 18.1   • Osteoporosis with pathological fracture   • Alcohol withdrawal         Past Medical History:   Diagnosis Date   • Anxiety    • Degeneration of lumbar or lumbosacral intervertebral disc 6/1/2017   • Hypertension    • Low back pain    • Tremors of nervous system        SUBJECTIVE: 82 y.o.  female. Awake and alert.  Better today.     OBJECTIVE:   Vitals:    02/20/18 2258 02/21/18 0259 02/21/18 0713 02/21/18 1144   BP: 151/82 105/62 115/70 117/67   BP Location: Right arm Right arm Right arm Right arm   Patient Position: Lying Lying Lying Sitting   Pulse: 98 84 93 97   Resp: 18 18 16 20   Temp: 98 °F (36.7 °C) 96.8 °F (36 °C) 98.3 °F (36.8 °C) 97.6 °F (36.4 °C)   TempSrc: Axillary Oral Oral Oral   SpO2: 94% 93% 95% 97%   Weight:       Height:         I/O last 3 completed shifts:  In: 2134 [P.O.:1060; I.V.:1074]  Out: 900 [Urine:900]    Current Medications:  Scheduled Meds:  amLODIPine 5 mg Oral Daily   calcium-vitamin D 500 mg Oral BID   citalopram 20 mg Oral Daily   docusate sodium 100 mg Oral BID   donepezil 5 mg Oral Nightly   LORazepam 0.5 mg Oral Daily   mirtazapine 15 mg Oral Nightly   multivitamin 1 tablet Oral Daily    polyethylene glycol 17 g Oral Daily   primidone 100 mg Oral Daily   rivaroxaban 10 mg Oral Daily With Dinner     PRN Meds:.•  acetaminophen  •  acetaminophen  •  bisacodyl  •  HYDROcodone-acetaminophen  •  HYDROcodone-acetaminophen  •  LORazepam  •  magnesium hydroxide  •  melatonin  •  ondansetron  •  ondansetron **OR** ondansetron ODT **OR** ondansetron  •  sodium chloride  •  Insert peripheral IV **AND** sodium chloride    Diagnostic Tests:   Lab Results (last 24 hours)     ** No results found for the last 24 hours. **          PHYSICAL EXAM:  Right thigh dressing dry and intact.  Calf soft and nontender.  Good motion and sensation to right foot and ankle.  Tolerating T-Scope brace.       ASSESSMENT & PLAN:    Have discussed with Dr. Rausch.  Will allow the brace to be unlocked to work on PROM and AROM exercises of right knee from 0-60 degrees.  Brace will need to be locked back in full extension after PT.  Remain strict NWB RLE.      OK to transfer to rehab anytime from Orth standpoint.  Will need to RTO in 2 weeks to see Dr. Rausch.     Date: 2/21/2018    ABELINO Leon MD

## 2018-02-21 NOTE — DISCHARGE SUMMARY
Name: Alona Cisneros  Age: 82 y.o.  Sex: female  :  1935  MRN: [id number]         Primary Care Physician: Pierre Ibrahim MD      Date of Admission:  2018  Date of Discharge:  2018      CHIEF COMPLAINT  Fall and Leg Injury      DISCHARGE DIAGNOSIS  Active Hospital Problems (** Indicates Principal Problem)    Diagnosis Date Noted   • **Closed displaced oblique fracture of shaft of right femur [S72.331A] 2018   • Underweight BMI 18.1 [R63.6] 2018   • Osteoporosis with pathological fracture [M80.00XA] 2018   • Alcohol withdrawal [F10.239] 2018   • Fall from standing [W19.XXXA] 2018   • Anxiety [F41.9] 2018   • Hypertension [I10] 2018   • Tremors of nervous system [R25.1] 2018   • Degeneration of lumbar or lumbosacral intervertebral disc [M51.37] 2017      Resolved Hospital Problems    Diagnosis Date Noted Date Resolved   No resolved problems to display.       SECONDARY DIAGNOSES  Past Medical History:   Diagnosis Date   • Anxiety    • Degeneration of lumbar or lumbosacral intervertebral disc 2017   • Hypertension    • Low back pain    • Tremors of nervous system        CONSULTS   Consult Orders (all)     Start     Ordered    18 0846  Inpatient Consult to Cardiology  Once     Specialty:  Cardiology  Provider:  London Head III, MD    18 0848    18 0643  Inpatient Consult to Orthopedic Surgery  Once     Specialty:  Orthopedic Surgery  Provider:  Nima Rausch MD    18 0642            PROCEDURES PERFORMED  RIGHT FEMUR PERIPROSTHETIC FRACTURE REPAIR ORIF        HOSPITAL COURSE  Ms. Cisneros is a 82 y.o. female who presented to [facility] initially complaining of pain in her hip after sustaining a fall. Please see the admitting history and physical for further details. She was found to have hip fracture and was admitted to the hospital for further evaluation and treatment. After cardiac clearance she was  taken for the above-mentioned surgical procedure. She's had no significant postoperative complications. She was found to have significant osteoporosis during surgery. She is already on calcium and vitamin D. Her vitamin D levels are normal. Advised that she talk to her primary care provider about restarting a bisphosphonate she has taken in the past. Arrangements have been made for transfer to the [facility] for further rehabilitation. She is on Xarelto for DVT prophylaxis per orthopedics.        PHYSICAL EXAM  Temp:  [96.8 °F (36 °C)-99.4 °F (37.4 °C)] 97.6 °F (36.4 °C)  Heart Rate:  [84-98] 97  Resp:  [16-20] 20  BP: (105-151)/(62-82) 117/67  Body mass index is 18.08 kg/(m^2).  Physical Exam  Constitutional: She is oriented to person, place, and time. She is cooperative. No distress.   Frail, elderly, poor muscle mass   Cardiovascular: Normal rate and regular rhythm.  No murmur heard.  Pulmonary/Chest: Effort normal and breath sounds normal. No respiratory distress.   Abdominal: Soft. Normal appearance and bowel sounds are normal. She exhibits no distension. There is no tenderness.   Neurological: She is alert and oriented to person, place, and time.   Skin: Skin is warm and dry. No rash noted.         CONDITION ON DISCHARGE  Stable.      DISCHARGE DISPOSITION   [facility]      ALLERGIES  No Known Allergies      DISCHARGE MEDICATIONS   Alona Cisneros   Home Medication Instructions KAREN:[id number]    Printed on:02/21/18 8668   Medication Information                      albuterol (VENTOLIN HFA) 108 (90 BASE) MCG/ACT inhaler  Inhale 2 puffs Every 4 (Four) Hours As Needed for wheezing.             amLODIPine (NORVASC) 5 MG tablet  Take 5 mg by mouth daily.             aspirin 81 MG chewable tablet  Chew 81 mg daily.             Calcium Citrate-Vitamin D (CALCIUM CITRATE + PO)  Take  by mouth Daily.             citalopram (CELEXA) 40 MG tablet  Take 1 tablet by mouth daily.             docusate sodium 100 MG  capsule  Take 100 mg by mouth 2 (Two) Times a Day.             donepezil (ARICEPT) 5 MG tablet  Take 5 mg by mouth Every Night.             HYDROcodone-acetaminophen (NORCO) 5-325 MG per tablet  Take 1-2 tabs po q 4 hours prn pain             LORazepam (ATIVAN) 0.5 MG tablet  Take 1 tablet by mouth Daily.             mirtazapine (REMERON) 7.5 MG half tablet  Take 15 mg by mouth Every Night.             Multiple Vitamin (MV-ONE PO)  Take 1 tablet by mouth daily.             polyethylene glycol (MIRALAX) packet  Take 17 g by mouth Daily.             primidone (MYSOLINE) 50 MG tablet  Take 50 mg by mouth 2 (two) times a day.             rivaroxaban (XARELTO) 10 MG tablet  Take 1 tablet by mouth Daily With Dinner.                 Diet Instructions     Diet: Regular, Cardiac       Discharge Diet:   Regular  Cardiac                   Activity Instructions     Activity as Tolerated                 No future appointments.  Follow-up Information     Follow up with [facility] .    Specialties:  Assisted Living Facility, Skilled Nursing Facility, Intermediate Care Facility    Contact information:    [address]  [facility] [address] [phone]        Follow up with Pierre Ibrahim MD Follow up in 2 week(s).    Specialty:  Family Medicine    Contact information:    [address] [place] [id number] [phone]          Follow up with David Tobin MD .    Specialty:  Psychiatry    Contact information:    [address]  [address]  [facility] [address]  [phone]          Follow up with Nima Rausch MD .    Specialty:  Orthopedic Surgery    Contact information:    [address]  [address]  [facility] [address] [phone]            TEST  RESULTS PENDING AT DISCHARGE  None        CODE STATUS  Full Code        Garcia Armstrong MD  [facility]  02/21/18  3:19 PM      Time: greater than 30 minutes.

## 2018-02-21 NOTE — SIGNIFICANT NOTE
02/21/18 1139   Rehab Treatment   Discipline physical therapist   Treatment Not Performed patient/family declined treatment  (Attempted PT this AM- patient currently eating and would like us to check back early afternoon. Will follow up.)   Recommendation   PT - Next Appointment 02/21/18

## 2018-02-21 NOTE — PLAN OF CARE
Problem: Patient Care Overview (Adult)  Goal: Plan of Care Review  Outcome: Ongoing (interventions implemented as appropriate)   02/21/18 0340   Coping/Psychosocial Response Interventions   Plan Of Care Reviewed With patient;daughter   Patient Care Overview   Progress progress towards functional goals is fair   Outcome Evaluation   Outcome Summary/Follow up Plan VSS. Peripheral vascular assessment WNL. Dressing with scant dried drainage. Remains NWB RLE. Transfers with assist X1-2 using walker and gait belt. Voices better pain control. Resting well this night. Understands all educational topics to include monitoring of b/p as has DX HTN. Possible d/c this date to [facility] for skilled therapy       Problem: Fall Risk (Adult)  Goal: Identify Related Risk Factors and Signs and Symptoms  Outcome: Outcome(s) achieved Date Met: 02/21/18 02/21/18 0340   Fall Risk   Fall Risk: Related Risk Factors age-related changes;fatigue/slow reaction;gait/mobility problems;polypharmacy;history of falls   Fall Risk: Signs and Symptoms presence of risk factors     Goal: Absence of Falls  Outcome: Ongoing (interventions implemented as appropriate)   02/21/18 0340   Fall Risk (Adult)   Absence of Falls achieves outcome       Problem: Orthopaedic Fracture (Adult)  Goal: Signs and Symptoms of Listed Potential Problems Will be Absent or Manageable (Orthopaedic Fracture)  Outcome: Ongoing (interventions implemented as appropriate)   02/21/18 0340   Orthopaedic Fracture   Problems Assessed (Orthopaedic Fracture) all   Problems Present (Orthopaedic Fracture) functional deficit/ self-care deficit;pain       Problem: Pressure Ulcer Risk (Yassine Scale) (Adult,Obstetrics,Pediatric)  Goal: Identify Related Risk Factors and Signs and Symptoms  Outcome: Outcome(s) achieved Date Met: 02/21/18 02/21/18 0340   Pressure Ulcer Risk (Yassine Scale)   Related Risk Factors (Pressure Ulcer Risk (Yassine Scale)) age extremes;mobility impaired     Goal: Skin  Integrity  Outcome: Ongoing (interventions implemented as appropriate)   02/21/18 0340   Pressure Ulcer Risk (Yassine Scale) (Adult,Obstetrics,Pediatric)   Skin Integrity making progress toward outcome

## 2018-02-21 NOTE — PLAN OF CARE
Problem: Patient Care Overview (Adult)  Goal: Plan of Care Review  Outcome: Ongoing (interventions implemented as appropriate)  Pt doing well, working well with therapy. Family at bedside. Pain is controlled with PO pain medication. Pt able to stand and pivot only. Dressing C/D/I. Hinge brace in place. VSS. NVI. Pt voiding with the use of BSC. Pt to be d/c'd to [facility] today via ambulance. Demonstrated use of IS. Educated pt on the importance of BP monitoring and medications as ordered with H/O HTN.    02/21/18 0340 02/21/18 0846   Coping/Psychosocial Response Interventions   Plan Of Care Reviewed With --  patient   Patient Care Overview   Progress progress towards functional goals is fair --      Goal: Adult Individualization and Mutuality  Outcome: Ongoing (interventions implemented as appropriate)    Goal: Discharge Needs Assessment  Outcome: Ongoing (interventions implemented as appropriate)      Problem: Fall Risk (Adult)  Goal: Identify Related Risk Factors and Signs and Symptoms  Outcome: Ongoing (interventions implemented as appropriate)    Goal: Absence of Falls  Outcome: Ongoing (interventions implemented as appropriate)      Problem: Orthopaedic Fracture (Adult)  Goal: Signs and Symptoms of Listed Potential Problems Will be Absent or Manageable (Orthopaedic Fracture)  Outcome: Ongoing (interventions implemented as appropriate)      Problem: Pressure Ulcer Risk (Yassine Scale) (Adult,Obstetrics,Pediatric)  Goal: Identify Related Risk Factors and Signs and Symptoms  Outcome: Ongoing (interventions implemented as appropriate)    Goal: Skin Integrity  Outcome: Ongoing (interventions implemented as appropriate)

## 2018-02-22 ENCOUNTER — EPISODE CHANGES (OUTPATIENT)
Dept: CASE MANAGEMENT | Facility: OTHER | Age: 83
End: 2018-02-22

## 2018-02-22 ENCOUNTER — PATIENT OUTREACH (OUTPATIENT)
Dept: CASE MANAGEMENT | Facility: OTHER | Age: 83
End: 2018-02-22

## 2018-02-22 ENCOUNTER — TELEPHONE (OUTPATIENT)
Dept: ORTHOPEDIC SURGERY | Facility: CLINIC | Age: 83
End: 2018-02-22

## 2018-02-22 NOTE — OUTREACH NOTE
Skilled Nursing Facility Discharge Flowsheet:     Skilled Nursing Facility Discharge Assessment 2/22/2018   Acute Facility Discharged From [facility]   Acute Discharge Date 2/21/2018   Name of the Skilled Nursing Facility? [facility]   Tier Level of the Skilled Nursing Facility 1   Purpose of SNF Admission PT;OT;WC   Estimated length of stay for the patient? To be Determined   Who is the insurance provider or payor of patient stay? Medicare

## 2018-02-28 ENCOUNTER — PATIENT OUTREACH (OUTPATIENT)
Dept: CASE MANAGEMENT | Facility: OTHER | Age: 83
End: 2018-02-28

## 2018-02-28 NOTE — OUTREACH NOTE
Skilled Nursing Facility Discharge Flowsheet:     Skilled Nursing Facility Discharge Assessment 2/28/2018   Acute Facility Discharged From [facility]   Acute Discharge Date 2/21/2018   Name of the Skilled Nursing Facility? [facility]   Tier Level of the Skilled Nursing Facility 1   Purpose of SNF Admission PT;OT;WC   Estimated length of stay for the patient? To be Determined   Who is the insurance provider or payor of patient stay? Medicare   Progression of Patient? Fair progress with therapies

## 2018-03-07 ENCOUNTER — PATIENT OUTREACH (OUTPATIENT)
Dept: CASE MANAGEMENT | Facility: OTHER | Age: 83
End: 2018-03-07

## 2018-03-07 ENCOUNTER — OFFICE VISIT (OUTPATIENT)
Dept: ORTHOPEDIC SURGERY | Facility: CLINIC | Age: 83
End: 2018-03-07

## 2018-03-07 VITALS — BODY MASS INDEX: 18.04 KG/M2 | TEMPERATURE: 97.8 F | WEIGHT: 126 LBS | HEIGHT: 70 IN

## 2018-03-07 DIAGNOSIS — S72.331D CLOSED DISPLACED OBLIQUE FRACTURE OF SHAFT OF RIGHT FEMUR WITH ROUTINE HEALING, SUBSEQUENT ENCOUNTER: Primary | ICD-10-CM

## 2018-03-07 PROCEDURE — 99024 POSTOP FOLLOW-UP VISIT: CPT | Performed by: ORTHOPAEDIC SURGERY

## 2018-03-07 NOTE — PROGRESS NOTES
Chief Complaint   Patient presents with   • Right Leg - Pain, Post-op Follow-up         HPI  Patient is here today for her first post op visit following periprosthetic fracture repair of right femur.Patient initially had a inner trochanteric fracture in 2014 that was repaired by Dr. Teran.  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  This was followed by a total hip arthroplasty performed in 2015 by Dr. Sidhu.  Most recently the patient sustained a fracture of the femoral diaphysis distal to the cemented total hip arthroplasty stem.  The stem was kept in place because it was well fixed and repair of the fracture was carried out with a laterally applied locking plate and cable system.  She has done quite well post operatively.  She does not have any fevers, rigors or chills.  There is no clinical deformity.  She is moving her toes and ankles well and overall seems to be in good spirits.  There were some issues with regards to excessive EtOH consumption and the family is addressing those issues with the patient in conjunction with her PCP.        Vitals:    03/07/18 0917   Temp: 97.8 °F (36.6 °C)           Joint/Body Part Specific Exam:  Right knee and femur: There is no clinical deformity.  The incision is clean and healing nicely.  Appropriate amounts of swelling and bruising are noted.  Cap refill is 2 seconds with a brisk return.  There is mild tenderness over the shaft of the distal femur at the site of the fracture.  There is no prominence of the implants on the metallic hardware that was used to repair the fracture.  Distal pulses are palpable.  Homans sign is negative.  Knee range of motion is from 10-90° of flexion.      X-RAY REPORT:        Alona was seen today for pain and post-op follow-up.    Diagnoses and all orders for this visit:    Closed displaced oblique fracture of shaft of right femur with routine healing, subsequent  encounter          Procedures        Instructions:      Plan:DC the alternative staples.    The remainder of the staples will be removed at the rehabilitation center.    Ace wrap from toes to groin to help decrease the swelling.    Okay to proceed with gentle active assisted range of motion exercises of the knee to prevent a posterior  stiffness.    Strict nonweightbearing for a total of 6 weeks postop.    Use the hinged I ROM brace to help immobilize the fracture and allow the soft tissues to heal.    Patient's osteoporosis definitely needs to be worked up and her DEXA scan needs to be performed.  This study can be done in the next 2-3 months once the healing has got an established.    Tablet Tylenol 650 mg extra strength Tylenol 1 by mouth twice a day for pain swelling and discomfort.    Decubitus precautions.    Follow-up in my office in 4 weeks and we will obtain x-rays upon return.

## 2018-03-07 NOTE — OUTREACH NOTE
Skilled Nursing Facility Discharge Flowsheet:     Skilled Nursing Facility Discharge Assessment 3/7/2018   Acute Facility Discharged From [facility]   Acute Discharge Date 2/21/2018   Name of the Skilled Nursing Facility? [facility]   Tier Level of the Skilled Nursing Facility 1   Purpose of SNF Admission PT;OT;WC   Estimated length of stay for the patient? Plan discharge 4/3/18   Who is the insurance provider or payor of patient stay? Medicare   Progression of Patient? Fair progress with therapies

## 2018-03-14 ENCOUNTER — PATIENT OUTREACH (OUTPATIENT)
Dept: CASE MANAGEMENT | Facility: OTHER | Age: 83
End: 2018-03-14

## 2018-03-14 NOTE — OUTREACH NOTE
Skilled Nursing Facility Discharge Flowsheet:     Skilled Nursing Facility Discharge Assessment 3/14/2018   Acute Facility Discharged From [facility]   Acute Discharge Date 2/21/2018   Name of the Skilled Nursing Facility? [facility]   Tier Level of the Skilled Nursing Facility 1   Purpose of SNF Admission PT;OT;WC   Estimated length of stay for the patient? Plan discharge 4/3/18   Who is the insurance provider or payor of patient stay? Medicare   Progression of Patient? Fair progress with therapies

## 2018-03-21 ENCOUNTER — PATIENT OUTREACH (OUTPATIENT)
Dept: CASE MANAGEMENT | Facility: OTHER | Age: 83
End: 2018-03-21

## 2018-03-21 NOTE — OUTREACH NOTE
Skilled Nursing Facility Discharge Flowsheet:     Skilled Nursing Facility Discharge Assessment 3/21/2018   Acute Facility Discharged From [facility]   Acute Discharge Date 2/21/2018   Name of the Skilled Nursing Facility? [facility]   Tier Level of the Skilled Nursing Facility 1   Purpose of SNF Admission PT;OT;WC   Estimated length of stay for the patient? Plan discharge 4/3/18   Who is the insurance provider or payor of patient stay? Medicare   Progression of Patient? Fair progress with therapies

## 2018-03-27 ENCOUNTER — PATIENT OUTREACH (OUTPATIENT)
Dept: CASE MANAGEMENT | Facility: OTHER | Age: 83
End: 2018-03-27

## 2018-03-27 NOTE — OUTREACH NOTE
Skilled Nursing Facility Discharge Flowsheet:     Skilled Nursing Facility Discharge Assessment 3/27/2018   Acute Facility Discharged From [facility]   Acute Discharge Date 2/21/2018   Name of the Skilled Nursing Facility? [facility]   Tier Level of the Skilled Nursing Facility 1   Purpose of SNF Admission PT;OT;WC   Estimated length of stay for the patient? To be determined   Who is the insurance provider or payor of patient stay? -   Progression of Patient? Continue with skilled therapies daily

## 2018-04-04 ENCOUNTER — PATIENT OUTREACH (OUTPATIENT)
Dept: CASE MANAGEMENT | Facility: OTHER | Age: 83
End: 2018-04-04

## 2018-04-04 NOTE — OUTREACH NOTE
Skilled Nursing Facility Discharge Flowsheet:     Skilled Nursing Facility Discharge Assessment 4/4/2018   Acute Facility Discharged From [facility]   Acute Discharge Date 2/21/2018   Name of the Skilled Nursing Facility? [facility]   Tier Level of the Skilled Nursing Facility 1   Purpose of SNF Admission PT;OT;WC   Estimated length of stay for the patient? To be determined   Who is the insurance provider or payor of patient stay? Medicare   Progression of Patient? Fair

## 2018-04-11 ENCOUNTER — PATIENT OUTREACH (OUTPATIENT)
Dept: CASE MANAGEMENT | Facility: OTHER | Age: 83
End: 2018-04-11

## 2018-04-11 ENCOUNTER — OFFICE VISIT (OUTPATIENT)
Dept: ORTHOPEDIC SURGERY | Facility: CLINIC | Age: 83
End: 2018-04-11

## 2018-04-11 VITALS — HEIGHT: 70 IN | WEIGHT: 127 LBS | BODY MASS INDEX: 18.18 KG/M2 | TEMPERATURE: 98.4 F

## 2018-04-11 DIAGNOSIS — S72.331D CLOSED DISPLACED OBLIQUE FRACTURE OF SHAFT OF RIGHT FEMUR WITH ROUTINE HEALING, SUBSEQUENT ENCOUNTER: Primary | ICD-10-CM

## 2018-04-11 PROCEDURE — 73502 X-RAY EXAM HIP UNI 2-3 VIEWS: CPT | Performed by: ORTHOPAEDIC SURGERY

## 2018-04-11 PROCEDURE — 99024 POSTOP FOLLOW-UP VISIT: CPT | Performed by: ORTHOPAEDIC SURGERY

## 2018-04-11 PROCEDURE — 73562 X-RAY EXAM OF KNEE 3: CPT | Performed by: ORTHOPAEDIC SURGERY

## 2018-04-11 NOTE — PROGRESS NOTES
Chief Complaint   Patient presents with   • Right Leg - Post-op         HPI patient here for post op visit on right femur fracture.  Patient had a periprosthetic fracture distal to a total hip arthroplasty stem.  She underwent open reduction internal fixation with lateral plate application and cable/screw system.  This was performed by me on 18 February 2018.  She is doing well postoperatively.  Her have any pain or discomfort.  There is no clinical deformity.  She has no fevers, rigors or chills.  Overall, she is not using any pain medication and seems to be making excellent progress status post ORIF of a displaced femoral diaphyseal fracture.        Vitals:    04/11/18 1501   Temp: 98.4 °F (36.9 °C)           Joint/Body Part Specific Exam:  Right femur: There is no clinical deformity.  The laterally based incision is completely healed.  Neurovascular status is intact.  Distal pulses are palpable over the dorsalis pedis artery.  Knee range of motion is from 5-90° of flexion.  Further flexion associated with some pain and discomfort.  Hardware related problems are noted.  There is no abnormal mobility at the fracture site.  Tenderness over the femoral diaphyseal fracture settling down as would be consistent with a healing fracture of the femoral diaphysis.      X-RAY REPORT:  right Hip X-Ray  Indication: Evaluation of total hip arthroplasty proximal to a periprosthetic fracture  AP and lateral  Findings: Well placed total hip replacement stem without any loosening or subsidence.  no bony lesion  Soft tissues within normal limits  within normal limits joint spaces  Hardware appropriately positioned yes      yes prior studies available for comparison.    X-RAY was ordered and reviewed by Nima Rausch MD      right Knee X-Ray  Indication: Evaluation of a distal femoral diaphyseal fracture, periprosthetic  AP, Lateral views  Findings: Anatomically reduced fracture with excellent position of the fracture fragments and  early callus formation noted  no bony lesion  Soft tissues within normal limits  within normal limits joint spaces  Hardware appropriately positioned yes      yes prior studies available for comparison.    X-RAY was ordered and reviewed by Nima Rausch MD      Alona was seen today for post-op.    Diagnoses and all orders for this visit:    Closed displaced oblique fracture of shaft of right femur with routine healing, subsequent encounter  -     Ambulatory Referral to Physical Therapy Evaluate and treat; (WBAT)  -     XR Hip With or Without Pelvis 2 - 3 View Right  -     XR Knee 3 View Right            Procedures        Instructions:      Plan: Weightbearing as tolerated with a walker.    Falls precautions.    Unlocked the hinged I ROM brace to allow full range of motion of the knee.    Note for physical therapy given to the patient with they can start working on the knee to restore the range of motion and the strength and endurance of quad and hamstring function.    Calcium and vitamin D for bone health.    Tablet ibuprofen 600 mg orally twice a day for pain swelling and discomfort.    Follow-up in my office in 6 weeks for reevaluation and repeat x-rays.

## 2018-04-11 NOTE — OUTREACH NOTE
Skilled Nursing Facility Discharge Flowsheet:     Skilled Nursing Facility Discharge Assessment 4/11/2018   Acute Facility Discharged From [facility]   Acute Discharge Date 2/21/2018   Name of the Skilled Nursing Facility? [facility]   Tier Level of the Skilled Nursing Facility 1   Purpose of SNF Admission PT;OT;WC   Estimated length of stay for the patient? To be determined   Who is the insurance provider or payor of patient stay? Medicare   Progression of Patient? Orhto appt. today

## 2018-04-17 ENCOUNTER — TELEPHONE (OUTPATIENT)
Dept: ORTHOPEDIC SURGERY | Facility: CLINIC | Age: 83
End: 2018-04-17

## 2018-04-17 NOTE — TELEPHONE ENCOUNTER
Daughter called stating she did not know patient is supposed to have physical therapy (says not mentioned by [facility] at last visit). [facility] has been leaving messages on patient's phone about scheduling. Daughter says patient lives at [place] in apartment, however since she was discharged from hospital on 2/21 she is in the [facility] for rehab. She will be there until middle of May. If patient needs therapy, she says it needs to be ordered there.  Please call daughter with reply.

## 2018-04-18 ENCOUNTER — PATIENT OUTREACH (OUTPATIENT)
Dept: CASE MANAGEMENT | Facility: OTHER | Age: 83
End: 2018-04-18

## 2018-04-18 NOTE — OUTREACH NOTE
Skilled Nursing Facility Discharge Flowsheet:     Skilled Nursing Facility Discharge Assessment 4/18/2018   Acute Facility Discharged From [facility]   Acute Discharge Date 2/21/2018   Name of the Skilled Nursing Facility? [facility]   Tier Level of the Skilled Nursing Facility 1   Purpose of SNF Admission PT;OT;WC   Estimated length of stay for the patient? To be determined   Who is the insurance provider or payor of patient stay? Medicare   Progression of Patient? Therapies continue

## 2018-04-19 NOTE — TELEPHONE ENCOUNTER
Please call in the patient a note for physical therapy.  Recommendations are with partial weightbearing on the operative extremity.  Gentle active range of motion of the knee with strengthening of the quad and hamstring.  Gentle active mobilization of the hip as well.  Please send and they're order to the [facility] home and then let her daughter know as well.

## 2018-04-19 NOTE — TELEPHONE ENCOUNTER
Order is already in the patient chart it was placed in the chart at the patients visit on 4/11/18. Faxed to [phone]

## 2018-04-25 ENCOUNTER — PATIENT OUTREACH (OUTPATIENT)
Dept: CASE MANAGEMENT | Facility: OTHER | Age: 83
End: 2018-04-25

## 2018-04-25 NOTE — OUTREACH NOTE
Skilled Nursing Facility Discharge Flowsheet:     Skilled Nursing Facility Discharge Assessment 4/25/2018   Acute Facility Discharged From [facility]   Acute Discharge Date 2/21/2018   Name of the Skilled Nursing Facility? [facility]   Tier Level of the Skilled Nursing Facility 1   Purpose of SNF Admission -   Estimated length of stay for the patient? Last Medicare Day 5/3/18  Home vs LTC   Who is the insurance provider or payor of patient stay? Medicare   Progression of Patient? Conference with daughter today

## 2018-05-02 ENCOUNTER — PATIENT OUTREACH (OUTPATIENT)
Dept: CASE MANAGEMENT | Facility: OTHER | Age: 83
End: 2018-05-02

## 2018-05-02 NOTE — OUTREACH NOTE
Skilled Nursing Facility Discharge Flowsheet:     Skilled Nursing Facility Discharge Assessment 5/2/2018   Acute Facility Discharged From [facility]   Acute Discharge Date 2/21/2018   Name of the Skilled Nursing Facility? [facility]   Tier Level of the Skilled Nursing Facility 1   Purpose of SNF Admission PT;OT;WC   Estimated length of stay for the patient? Last Medicare Day 5/3/18  Home vs LTC   Who is the insurance provider or payor of patient stay? Medicare   Progression of Patient? LTC vs Home TBD

## 2018-05-04 ENCOUNTER — PATIENT OUTREACH (OUTPATIENT)
Dept: CASE MANAGEMENT | Facility: OTHER | Age: 83
End: 2018-05-04

## 2018-05-10 ENCOUNTER — EPISODE CHANGES (OUTPATIENT)
Dept: CASE MANAGEMENT | Facility: OTHER | Age: 83
End: 2018-05-10

## 2018-05-23 ENCOUNTER — OFFICE VISIT (OUTPATIENT)
Dept: ORTHOPEDIC SURGERY | Facility: CLINIC | Age: 83
End: 2018-05-23

## 2018-05-23 ENCOUNTER — PATIENT OUTREACH (OUTPATIENT)
Dept: CASE MANAGEMENT | Facility: OTHER | Age: 83
End: 2018-05-23

## 2018-05-23 ENCOUNTER — EPISODE CHANGES (OUTPATIENT)
Dept: CASE MANAGEMENT | Facility: OTHER | Age: 83
End: 2018-05-23

## 2018-05-23 DIAGNOSIS — M80.00XD OSTEOPOROSIS WITH PATHOLOGICAL FRACTURE WITH ROUTINE HEALING, SUBSEQUENT ENCOUNTER: ICD-10-CM

## 2018-05-23 DIAGNOSIS — S72.331D CLOSED DISPLACED OBLIQUE FRACTURE OF SHAFT OF RIGHT FEMUR WITH ROUTINE HEALING, SUBSEQUENT ENCOUNTER: Primary | ICD-10-CM

## 2018-05-23 PROCEDURE — 73552 X-RAY EXAM OF FEMUR 2/>: CPT | Performed by: ORTHOPAEDIC SURGERY

## 2018-05-23 PROCEDURE — 99213 OFFICE O/P EST LOW 20 MIN: CPT | Performed by: ORTHOPAEDIC SURGERY

## 2018-05-23 NOTE — PROGRESS NOTES
Chief Complaint   Patient presents with   • Right Femur - Follow-up         HPI the patient is here today for a follow up of her right femur surgery that was done on 2/18/18.  Patient states that she is doing extremely well.  She does not have any significant pain or discomfort.  There is no clinical deformity.  She is on a weightbearing as tolerated status at the rehabilitation center where she is recuperating.  She tolerates the brace well.  I have encouraged her to continue to use the brace to prevent any recurrence of the injury to the femur and if she did take a fall there would be some measure of protection to the femur.  The patient and her family both state that she has not consumed any alcohol at all since being discharged from the hospital which is wonderful for her improved quality of life.        There were no vitals filed for this visit.        Joint/Body Part Specific Exam:  Right femur: The incision is clean fully healed.  There is no clinical deformity.  There is no shortening of the right femur.  Range of motion of the knee is from 0-120° of flexion.  Right hip flexion 0-90°.  No abnormal mobility at the fracture site.  No crepitus at the fracture site either.  There are no hardware related complications.  There is no rotatory malalignment.    right hip. The patient is status 2 year(s) post hip prosthetic replacement for a proximal femoral fracture. Incision is clean and healing well. Calf is soft and nontender. Homans sign is negative. There is no limb length discrepancy. Hip flexion is 0-90, hip abduction is 0-30. Neurovascular status is intact, no limb length discrepancy reported by the patient. Common peroneal nerve function is well preserved. There is no hardware related problem.      X-RAY REPORT:  right Femur X-Ray  Indication: Evaluation of healing of her femoral fracture, periprosthetic  AP and Lateral views  Findings: Anatomically reduced fracture with healing callus noted.  no bony  lesion  Soft tissues within normal limits  within normal limits joint spaces  Hardware appropriately positioned yes: Patient has a cemented total hip arthroplasty proximal to the fracture site.  The lateral side plate is holding the proximal and distal fragments of the periprosthetic fracture in good position.  Cables are noted to be well placed as well.      yes prior studies available for comparison.    X-RAY was ordered and reviewed by Sharlene Rojas MA              Alona was seen today for follow-up.    Diagnoses and all orders for this visit:    Closed displaced oblique fracture of shaft of right femur with routine healing, subsequent encounter  -     XR Femur 2 View Right            Procedures        Instructions:      Plan: Weightbearing as tolerated.    Falls precaution.    Continue to use the hinged I ROM knee brace to prevent the knee from buckling and giving out.    Use a walker for assistance with ambulation.    Tablet ibuprofen 600 mg orally twice a day for pain swelling and discomfort.    Gentle active mobilization of the knee.    Follow-up in my office in 3 months.

## 2018-05-30 ENCOUNTER — EPISODE CHANGES (OUTPATIENT)
Dept: CASE MANAGEMENT | Facility: OTHER | Age: 83
End: 2018-05-30

## 2018-05-30 ENCOUNTER — PATIENT OUTREACH (OUTPATIENT)
Dept: CASE MANAGEMENT | Facility: OTHER | Age: 83
End: 2018-05-30

## 2018-06-14 ENCOUNTER — TELEPHONE (OUTPATIENT)
Dept: ORTHOPEDIC SURGERY | Facility: CLINIC | Age: 83
End: 2018-06-14

## 2018-07-10 ENCOUNTER — EPISODE CHANGES (OUTPATIENT)
Dept: CASE MANAGEMENT | Facility: OTHER | Age: 83
End: 2018-07-10

## 2018-08-22 ENCOUNTER — OFFICE VISIT (OUTPATIENT)
Dept: ORTHOPEDIC SURGERY | Facility: CLINIC | Age: 83
End: 2018-08-22

## 2018-08-22 VITALS — WEIGHT: 125 LBS | HEIGHT: 70 IN | TEMPERATURE: 97.9 F | BODY MASS INDEX: 17.9 KG/M2

## 2018-08-22 DIAGNOSIS — Z98.890 HISTORY OF OPEN REDUCTION AND INTERNAL FIXATION (ORIF) PROCEDURE: Primary | ICD-10-CM

## 2018-08-22 DIAGNOSIS — F10.930 ALCOHOL WITHDRAWAL SYNDROME WITHOUT COMPLICATION (HCC): ICD-10-CM

## 2018-08-22 PROCEDURE — 99213 OFFICE O/P EST LOW 20 MIN: CPT | Performed by: ORTHOPAEDIC SURGERY

## 2018-08-22 PROCEDURE — 73552 X-RAY EXAM OF FEMUR 2/>: CPT | Performed by: ORTHOPAEDIC SURGERY

## 2018-08-22 RX ORDER — CALCIUM POLYCARBOPHIL 625 MG 625 MG/1
625 TABLET ORAL DAILY
COMMUNITY
End: 2020-02-26

## 2018-08-22 RX ORDER — CHLORAL HYDRATE 500 MG
CAPSULE ORAL
COMMUNITY
End: 2020-02-26

## 2018-08-22 NOTE — PROGRESS NOTES
Chief Complaint   Patient presents with   • Right Leg - Follow-up, Pain         HPI  Patient is here today for a post op assessment of her surgery on 2/18/18.  The patient had a periprosthetic right mid diaphyseal femoral shaft fracture distal to a total hip arthroplasty.  The total hip arthroplasty was performed 2 years ago by Dr. Jhonathan Sidhu.  She is doing well after plate fixation of the femoral diaphyseal fracture distal to the hip prosthesis.  There is no evidence clinically suggesting that there is any loosening of the hip arthroplasty implants.  She does not have any hardware related problems.  She is neurovascularly intact.  Her pain is minimal at this point.  She has recovered full range of motion of the knee.  She is using a walker for assistance with ambulation.  Overall, the patient is a tear he pleased with the outcome of a major orthopedic intervention.        Vitals:    08/22/18 1045   Temp: 97.9 °F (36.6 °C)   Review of systems: A full and thorough review of systems is carried out.  9 point view of systems care out and was essentially negative.    Physical examination: In general physical examination was carried out including the respiratory system, cardiovascular system, neurological system, genitourinary system, GI system, skin, musculoskeletal, nervous system, eyes ENT and are all within normal range of function.        Joint/Body Part Specific Exam:  Right hip fever and knee: The hip replacement is in good position at this point.  The lateral sided incision is completely healed.  There is no hardware related problem at this point.  Hip flexion is from 0-90°.  Hip abduction is 0-45°.  Refill is 2 seconds with a brisk return.  There is no clinical deformity.  There is no abnormal mobility at the fracture site.  The fracture appears to be both clinically and radiologically united.      X-RAY REPORT:  right Femur /knee X-Ray  Indication: Evaluation of healing of a periprosthetic femoral fracture  AP  and Lateral views  Findings: Anatomically reduced fracture fragments with callus formation present  no bony lesion  Soft tissues within normal limits  within normal limits joint spaces  Hardware appropriately positioned yes      yes prior studies available for comparison.    X-RAY was ordered and reviewed by Nima Rausch MD    right Knee X-Ray  Indication: Evaluation of healing of a periprosthetic fracture of the distal femur  AP, Lateral views  Findings: Anatomically reduced fracture with callus formation noted  no bony lesion  Soft tissues within normal limits  within normal limits joint spaces  Hardware appropriately positioned yes      yes prior studies available for comparison.    X-RAY was ordered and reviewed by Nima Rausch MD          Alona was seen today for follow-up and pain.    Diagnoses and all orders for this visit:    History of ORIF-RIGHT FEMUR PERIPROSTHETIC FRACTURE REPAIR OPEN REDUCTION INTERNAL FIXATION - Right  -     XR Femur 2 View Right    Alcohol withdrawal syndrome without complication (CMS/HCC)            Procedures        Instructions:      Plan: Weightbearing as tolerated.    Wean off the hinged I ROM brace.    Use a lighter brace for the knee to prevent it from buckling and giving out.    Falls precautions.    Calcium and vitamin D for bone health.    Tablet ibuprofen 600 mg orally twice a day for pain swelling and discomfort.    Follow-up in my office in 6 months for reevaluation.    Use a walker for assistance with ambulation

## 2018-08-26 PROBLEM — Z98.890 HISTORY OF OPEN REDUCTION AND INTERNAL FIXATION (ORIF) PROCEDURE: Status: ACTIVE | Noted: 2018-08-26

## 2018-09-13 ENCOUNTER — TELEPHONE (OUTPATIENT)
Dept: FAMILY MEDICINE CLINIC | Facility: CLINIC | Age: 83
End: 2018-09-13

## 2018-11-16 ENCOUNTER — APPOINTMENT (OUTPATIENT)
Dept: GENERAL RADIOLOGY | Facility: HOSPITAL | Age: 83
End: 2018-11-16

## 2018-11-16 ENCOUNTER — HOSPITAL ENCOUNTER (EMERGENCY)
Facility: HOSPITAL | Age: 83
Discharge: HOME | End: 2018-11-16
Attending: EMERGENCY MEDICINE | Admitting: EMERGENCY MEDICINE

## 2018-11-16 VITALS
WEIGHT: 138 LBS | OXYGEN SATURATION: 95 % | TEMPERATURE: 97.9 F | HEART RATE: 70 BPM | BODY MASS INDEX: 20.92 KG/M2 | DIASTOLIC BLOOD PRESSURE: 71 MMHG | SYSTOLIC BLOOD PRESSURE: 138 MMHG | RESPIRATION RATE: 18 BRPM | HEIGHT: 68 IN

## 2018-11-16 DIAGNOSIS — S86.911A MUSCLE STRAIN OF RIGHT LOWER LEG, INITIAL ENCOUNTER: ICD-10-CM

## 2018-11-16 DIAGNOSIS — S70.01XA CONTUSION OF RIGHT HIP, INITIAL ENCOUNTER: ICD-10-CM
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
## 2018-11-16 PROCEDURE — 99283 EMERGENCY DEPT VISIT LOW MDM: CPT

## 2018-11-16 PROCEDURE — 73552 X-RAY EXAM OF FEMUR 2/>: CPT

## 2018-11-16 PROCEDURE — 73560 X-RAY EXAM OF KNEE 1 OR 2: CPT

## 2018-11-16 PROCEDURE — 73590 X-RAY EXAM OF LOWER LEG: CPT

## 2018-11-16 PROCEDURE — 72170 X-RAY EXAM OF PELVIS: CPT

## 2018-11-16 RX ORDER — HYDROCODONE BITARTRATE AND ACETAMINOPHEN 5; 325 MG/1; MG/1
1 TABLET ORAL EVERY 6 HOURS PRN
Status: DISCONTINUED | OUTPATIENT
Start: 2018-11-16 | End: 2018-11-16 | Stop reason: HOSPADM

## 2018-11-16 RX ORDER — HYDROCODONE BITARTRATE AND ACETAMINOPHEN 5; 325 MG/1; MG/1
TABLET ORAL
Qty: 20 TABLET | Refills: 0 | Status: SHIPPED | OUTPATIENT
Start: 2018-11-16 | End: 2020-02-26

## 2018-11-16 RX ADMIN — HYDROCODONE BITARTRATE AND ACETAMINOPHEN 1 TABLET: 5; 325 TABLET ORAL at 16:48

## 2018-11-16 NOTE — ED PROVIDER NOTES
" EMERGENCY DEPARTMENT ENCOUNTER    CHIEF COMPLAINT  Chief Complaint: Pain post fall  History given by: Patient, family  History limited by: Dementia  Room Number: 39/39  PMD: Huong Ascencio MD      HPI:  Pt is a 82 y.o. female, Hx of Dementia, who presents complaining of right-sided pain s/p fall. Pt states her \"legs gave out\" and she \"went down\". Pt denies head injury or LOC. Per family, pt called them first then they instructed her to call for aide in her NH. Pt reports right leg pain and right hip pain. Pt reports Hx of right hip Fx in 2014 and right hip joint replacement in 2015. Pt denies CP, SOA, abd pain, or N/V.      Duration:  PTA  Onset: sudden  Timing: constant  Location: right-sided hip and leg  Radiation: none  Quality: pain  Intensity/Severity: moderate  Progression: unchanged  Associated Symptoms: none  Aggravating Factors: none  Alleviating Factors: none  Previous Episodes: Pt has Hx of falls and right hip Fx.   Treatment before arrival: none    PAST MEDICAL HISTORY  Active Ambulatory Problems     Diagnosis Date Noted   • Degeneration of lumbar or lumbosacral intervertebral disc 06/01/2017   • Lumbar neuritis 06/01/2017   • Closed displaced oblique fracture of shaft of right femur (CMS/[facility]) 02/17/2018   • Fall from standing 02/17/2018   • Anxiety 02/17/2018   • Hypertension 02/17/2018   • Tremors of nervous system 02/17/2018   • Underweight BMI 18.1 02/19/2018   • Osteoporosis with pathological fracture 02/19/2018   • Alcohol withdrawal (CMS/[facility]) 02/19/2018   • History of ORIF-RIGHT FEMUR PERIPROSTHETIC FRACTURE REPAIR OPEN REDUCTION INTERNAL FIXATION - Right 08/26/2018     Resolved Ambulatory Problems     Diagnosis Date Noted   • No Resolved Ambulatory Problems     Past Medical History:   Diagnosis Date   • Anxiety    • Degeneration of lumbar or lumbosacral intervertebral disc 6/1/2017   • Hypertension    • Low back pain    • Tremors of nervous system        PAST SURGICAL HISTORY  Past Surgical " History:   Procedure Laterality Date   • BACK SURGERY      4 times lspine    • CATARACT EXTRACTION Bilateral    • COLONOSCOPY  12/28/2011   • JOINT REPLACEMENT      right hip        FAMILY HISTORY  Family History   Problem Relation Age of Onset   • COPD Father        SOCIAL HISTORY  Social History     Socioeconomic History   • Marital status:      Spouse name: Not on file   • Number of children: Not on file   • Years of education: Not on file   • Highest education level: Not on file   Social Needs   • Financial resource strain: Not on file   • Food insecurity - worry: Not on file   • Food insecurity - inability: Not on file   • Transportation needs - medical: Not on file   • Transportation needs - non-medical: Not on file   Occupational History   • Not on file   Tobacco Use   • Smoking status: Former Smoker     Types: Cigarettes   • Smokeless tobacco: Never Used   Substance and Sexual Activity   • Alcohol use: Yes   • Drug use: No   • Sexual activity: Defer   Other Topics Concern   • Not on file   Social History Narrative   • Not on file       ALLERGIES  Patient has no known allergies.    REVIEW OF SYSTEMS  Review of Systems   Unable to perform ROS: Dementia       PHYSICAL EXAM  ED Triage Vitals [11/16/18 1517]   Temp Heart Rate Resp BP SpO2   97.9 °F (36.6 °C) 70 -- 138/71 95 %      Temp src Heart Rate Source Patient Position BP Location FiO2 (%)   Tympanic Monitor Sitting -- --       Physical Exam   Constitutional: She is oriented to person, place, and time. No distress.   HENT:   Head: Normocephalic and atraumatic.   Eyes: EOM are normal. Pupils are equal, round, and reactive to light.   Neck: Normal range of motion.   Cardiovascular: Normal rate, regular rhythm and intact distal pulses.   Pulmonary/Chest: Effort normal and breath sounds normal. No respiratory distress.   Abdominal: Soft. Bowel sounds are normal. She exhibits no distension. There is no tenderness.   Musculoskeletal:        Lumbar back:  She exhibits no tenderness.   Tenderness to Posterior right hip, right groin, distal femur and proximal fibula. No obvious deformity. ROM intact. NVID.   Neurological: She is alert and oriented to person, place, and time. She has normal sensation and normal strength.   Skin: Skin is warm and dry.   Psychiatric: Affect normal.   Nursing note and vitals reviewed.      LAB RESULTS  Lab Results (last 24 hours)     ** No results found for the last 24 hours. **          I ordered the above labs and reviewed the results    RADIOLOGY  XR Pelvis 1 or 2 View   Final Result      XR Tibia Fibula 2 View Right   Final Result      XR Femur 2 View Right   Final Result      XR Knee 1 or 2 View Right   Final Result         ONE-VIEW PELVIS, 2-VIEW RIGHT FEMUR, 2-VIEW RIGHT KNEE, 2-VIEW RIGHT  TIBIA AND FIBULA     HISTORY: Fell. Pain in all these regions.     FINDINGS:  The patient has previous total hip replacement on the right  and there was also previous plate fixation of the mid and distal right  femur for previous fracture. No acute fracture is seen.     There is prominent generalized joint space narrowing at the knee  associated with meniscal calcification. The lateral view is obliqued and  does not allow evaluation for joint effusion. Views of the tibia and  fibula are negative for fracture.       I ordered the above noted radiological studies. Interpreted by radiologist. Reviewed by me in PACS.       PROCEDURES  Procedures      PROGRESS AND CONSULTS         1537  Ordered XR Pelvis, right femur, right knee, and right tib/fib    1640  Rechecked pt. Pt is resting comfortably. Notified pt of XR results. Discussed the plan to discharge the pt home with prescriptions for pain. I instructed the pt to f/u with PCP. Pt understands and agrees with the plan, all questions answered.        MEDICAL DECISION MAKING  Results were reviewed/discussed with the patient and they were also made aware of online access. Pt also made aware that some  labs, such as cultures, will not be resulted during ER visit and follow up with PMD is necessary.     MDM  Number of Diagnoses or Management Options  Contusion of right hip, initial encounter:   Fall, initial encounter:   Muscle strain of right lower leg, initial encounter:      Amount and/or Complexity of Data Reviewed  Tests in the radiology section of CPT®: ordered and reviewed (XRs show no acute Fx.)           DIAGNOSIS  Final diagnoses:   Fall, initial encounter   Contusion of right hip, initial encounter   Muscle strain of right lower leg, initial encounter       DISPOSITION  DISCHARGE    Patient discharged in stable condition.    Reviewed implications of results, diagnosis, meds, responsibility to follow up, warning signs and symptoms of possible worsening, potential complications and reasons to return to ER.    Patient/Family voiced understanding of above instructions.    Discussed plan for discharge, as there is no emergent indication for admission. Patient referred to primary care provider for BP management due to today's BP. Pt/family is agreeable and understands need for follow up and repeat testing.  Pt is aware that discharge does not mean that nothing is wrong but it indicates no emergency is present that requires admission and they must continue care with follow-up as given below or physician of their choice.     FOLLOW-UP  Huong Ascencio MD  [address] [place] [address]  [phone]    Schedule an appointment as soon as possible for a visit   If symptoms worsen         Medication List      Changed    citalopram 40 MG tablet  Commonly known as:  CELEXA  Take 1 tablet by mouth daily.  What changed:  how much to take              Latest Documented Vital Signs:  As of 6:41 PM  BP- 138/71 HR- 70 Temp- 97.9 °F (36.6 °C) (Tympanic) O2 sat- 95%    --  Documentation assistance provided by vicenta Fitch for Dr. Tariq.  Information recorded by the vicenta was done at my direction  and has been verified and validated by me.          Tre Fitch  11/16/18 1842       William Tariq MD  11/16/18 1919

## 2018-12-11 ENCOUNTER — APPOINTMENT (OUTPATIENT)
Dept: CARDIOLOGY | Facility: HOSPITAL | Age: 83
End: 2018-12-11
Attending: EMERGENCY MEDICINE

## 2018-12-11 ENCOUNTER — APPOINTMENT (OUTPATIENT)
Dept: GENERAL RADIOLOGY | Facility: HOSPITAL | Age: 83
End: 2018-12-11

## 2018-12-11 ENCOUNTER — HOSPITAL ENCOUNTER (EMERGENCY)
Facility: HOSPITAL | Age: 83
Discharge: HOME | End: 2018-12-11
Attending: EMERGENCY MEDICINE | Admitting: EMERGENCY MEDICINE

## 2018-12-11 VITALS
BODY MASS INDEX: 19.46 KG/M2 | SYSTOLIC BLOOD PRESSURE: 140 MMHG | RESPIRATION RATE: 16 BRPM | OXYGEN SATURATION: 98 % | HEIGHT: 67 IN | WEIGHT: 124 LBS | HEART RATE: 81 BPM | TEMPERATURE: 97.8 F | DIASTOLIC BLOOD PRESSURE: 70 MMHG

## 2018-12-11 DIAGNOSIS — M79.605 LEFT LEG PAIN: Primary | ICD-10-CM

## 2018-12-11 LAB
ALBUMIN SERPL-MCNC: 3.8 G/DL (ref 3.5–5.2)
ALBUMIN/GLOB SERPL: 1.3 G/DL
ALP SERPL-CCNC: 283 U/L (ref 39–117)
ALT SERPL W P-5'-P-CCNC: 25 U/L (ref 1–33)
ANION GAP SERPL CALCULATED.3IONS-SCNC: 13.7 MMOL/L
AST SERPL-CCNC: 23 U/L (ref 1–32)
BASOPHILS # BLD AUTO: 0.02 10*3/MM3 (ref 0–0.2)
BASOPHILS NFR BLD AUTO: 0.4 % (ref 0–1.5)
BH CV LOWER VASCULAR LEFT COMMON FEMORAL AUGMENT: NORMAL
BH CV LOWER VASCULAR LEFT COMMON FEMORAL COMPETENT: NORMAL
BH CV LOWER VASCULAR LEFT COMMON FEMORAL COMPRESS: NORMAL
BH CV LOWER VASCULAR LEFT COMMON FEMORAL PHASIC: NORMAL
BH CV LOWER VASCULAR LEFT COMMON FEMORAL SPONT: NORMAL
BH CV LOWER VASCULAR LEFT DISTAL FEMORAL COMPRESS: NORMAL
BH CV LOWER VASCULAR LEFT GASTRONEMIUS COMPRESS: NORMAL
BH CV LOWER VASCULAR LEFT GREATER SAPH AK COMPRESS: NORMAL
BH CV LOWER VASCULAR LEFT GREATER SAPH BK COMPRESS: NORMAL
BH CV LOWER VASCULAR LEFT LESSER SAPH COMPRESS: NORMAL
BH CV LOWER VASCULAR LEFT MID FEMORAL AUGMENT: NORMAL
BH CV LOWER VASCULAR LEFT MID FEMORAL COMPETENT: NORMAL
BH CV LOWER VASCULAR LEFT MID FEMORAL COMPRESS: NORMAL
BH CV LOWER VASCULAR LEFT MID FEMORAL PHASIC: NORMAL
BH CV LOWER VASCULAR LEFT MID FEMORAL SPONT: NORMAL
BH CV LOWER VASCULAR LEFT PERONEAL COMPRESS: NORMAL
BH CV LOWER VASCULAR LEFT POPLITEAL AUGMENT: NORMAL
BH CV LOWER VASCULAR LEFT POPLITEAL COMPETENT: NORMAL
BH CV LOWER VASCULAR LEFT POPLITEAL COMPRESS: NORMAL
BH CV LOWER VASCULAR LEFT POPLITEAL PHASIC: NORMAL
BH CV LOWER VASCULAR LEFT POPLITEAL SPONT: NORMAL
BH CV LOWER VASCULAR LEFT POSTERIOR TIBIAL COMPRESS: NORMAL
BH CV LOWER VASCULAR LEFT PROXIMAL FEMORAL COMPRESS: NORMAL
BH CV LOWER VASCULAR LEFT SAPHENOFEMORAL JUNCTION COMPRESS: NORMAL
BH CV LOWER VASCULAR LEFT SAPHENOFEMORAL JUNCTION PHASIC: NORMAL
BH CV LOWER VASCULAR LEFT SAPHENOFEMORAL JUNCTION SPONT: NORMAL
BH CV LOWER VASCULAR RIGHT COMMON FEMORAL AUGMENT: NORMAL
BH CV LOWER VASCULAR RIGHT COMMON FEMORAL COMPETENT: NORMAL
BH CV LOWER VASCULAR RIGHT COMMON FEMORAL COMPRESS: NORMAL
BH CV LOWER VASCULAR RIGHT COMMON FEMORAL PHASIC: NORMAL
BH CV LOWER VASCULAR RIGHT COMMON FEMORAL SPONT: NORMAL
BILIRUB SERPL-MCNC: 0.5 MG/DL (ref 0.1–1.2)
BUN BLD-MCNC: 21 MG/DL (ref 8–23)
BUN/CREAT SERPL: 32.8 (ref 7–25)
CALCIUM SPEC-SCNC: 9.2 MG/DL (ref 8.6–10.5)
CHLORIDE SERPL-SCNC: 101 MMOL/L (ref 98–107)
CO2 SERPL-SCNC: 23.3 MMOL/L (ref 22–29)
CREAT BLD-MCNC: 0.64 MG/DL (ref 0.57–1)
DEPRECATED RDW RBC AUTO: 53 FL (ref 37–54)
EOSINOPHIL # BLD AUTO: 0.04 10*3/MM3 (ref 0–0.7)
EOSINOPHIL NFR BLD AUTO: 0.8 % (ref 0.3–6.2)
ERYTHROCYTE [DISTWIDTH] IN BLOOD BY AUTOMATED COUNT: 15.4 % (ref 11.7–13)
ERYTHROCYTE [SEDIMENTATION RATE] IN BLOOD: 32 MM/HR (ref 0–30)
GFR SERPL CREATININE-BSD FRML MDRD: 89 ML/MIN/1.73
GLOBULIN UR ELPH-MCNC: 3 GM/DL
GLUCOSE BLD-MCNC: 112 MG/DL (ref 65–99)
HCT VFR BLD AUTO: 39.5 % (ref 35.6–45.5)
HGB BLD-MCNC: 12.2 G/DL (ref 11.9–15.5)
IMM GRANULOCYTES # BLD: 0 10*3/MM3 (ref 0–0.03)
IMM GRANULOCYTES NFR BLD: 0 % (ref 0–0.5)
LYMPHOCYTES # BLD AUTO: 1.03 10*3/MM3 (ref 0.9–4.8)
LYMPHOCYTES NFR BLD AUTO: 21 % (ref 19.6–45.3)
MCH RBC QN AUTO: 29.2 PG (ref 26.9–32)
MCHC RBC AUTO-ENTMCNC: 30.9 G/DL (ref 32.4–36.3)
MCV RBC AUTO: 94.5 FL (ref 80.5–98.2)
MONOCYTES # BLD AUTO: 0.63 10*3/MM3 (ref 0.2–1.2)
MONOCYTES NFR BLD AUTO: 12.8 % (ref 5–12)
NEUTROPHILS # BLD AUTO: 3.19 10*3/MM3 (ref 1.9–8.1)
NEUTROPHILS NFR BLD AUTO: 65 % (ref 42.7–76)
PLATELET # BLD AUTO: 292 10*3/MM3 (ref 140–500)
PMV BLD AUTO: 10 FL (ref 6–12)
POTASSIUM BLD-SCNC: 4.1 MMOL/L (ref 3.5–5.2)
PROT SERPL-MCNC: 6.8 G/DL (ref 6–8.5)
RBC # BLD AUTO: 4.18 10*6/MM3 (ref 3.9–5.2)
SODIUM BLD-SCNC: 138 MMOL/L (ref 136–145)
WBC NRBC COR # BLD: 4.91 10*3/MM3 (ref 4.5–10.7)

## 2018-12-11 PROCEDURE — 99284 EMERGENCY DEPT VISIT MOD MDM: CPT

## 2018-12-11 PROCEDURE — 85025 COMPLETE CBC W/AUTO DIFF WBC: CPT | Performed by: EMERGENCY MEDICINE

## 2018-12-11 PROCEDURE — 85652 RBC SED RATE AUTOMATED: CPT | Performed by: EMERGENCY MEDICINE

## 2018-12-11 PROCEDURE — 73552 X-RAY EXAM OF FEMUR 2/>: CPT

## 2018-12-11 PROCEDURE — 93971 EXTREMITY STUDY: CPT

## 2018-12-11 PROCEDURE — 73502 X-RAY EXAM HIP UNI 2-3 VIEWS: CPT

## 2018-12-11 PROCEDURE — 80053 COMPREHEN METABOLIC PANEL: CPT | Performed by: EMERGENCY MEDICINE

## 2018-12-11 NOTE — ED TRIAGE NOTES
PT TO ED VIA EMS FROM [place] FOR LEFT HIP PAIN, PAIN WORSE WITH MOVEMENT ONSET THIS MORNING. NKI

## 2018-12-11 NOTE — ED NOTES
"MD Hernandez at bedside for eval. Per family, last night pt was \"dragging\" her L leg when she was ambulating. Pt has since c/o nursing home staff of L leg; unknown if weakness or pain. NKI. Family also reports pt being drowsy today. Reassurance given; call light in reach. Pts breathing even and unlabored. Pt appears in NAD at this time. Family at bedside.        Kenyetta Kendall RN  12/11/18 1882    "

## 2018-12-11 NOTE — ED PROVIDER NOTES
" EMERGENCY DEPARTMENT ENCOUNTER    Room Number:  39/39  PCP: Huong Ascencio MD  Historian: Patient, Family      HPI  Chief Complaint: Leg Pain  Context: Alona Cisneros is an 82 y.o. female who currently resides in an assisted living facility. Pt undergoes physical therapy and last underwent it yesterday. Pt is s/p right hip arthroplasty performed in the past. Family reports that since last night, pt has had constant left leg pain. No known direct trauma sustained to the LLE recently. Pt states that her left leg pain worsens with movement and improves with remaining still. Pt denies documented fever, chest pain, dyspnea, BLE edema, and abdominal pain. There are no other complaints at this time.       Pain Location: Left leg  Radiation: None  Character: \"aching\"  Duration: Onset last night   Severity: Moderate  Progression: Unchanged  Aggravating Factors: Movement  Alleviating Factors: Remaining still         MEDICAL RECORD REVIEW    Pt has had a right hip replacement performed in the past.           PAST MEDICAL HISTORY  Active Ambulatory Problems     Diagnosis Date Noted   • Degeneration of lumbar or lumbosacral intervertebral disc 06/01/2017   • Lumbar neuritis 06/01/2017   • Closed displaced oblique fracture of shaft of right femur (CMS/[facility]) 02/17/2018   • Fall from standing 02/17/2018   • Anxiety 02/17/2018   • Hypertension 02/17/2018   • Tremors of nervous system 02/17/2018   • Underweight BMI 18.1 02/19/2018   • Osteoporosis with pathological fracture 02/19/2018   • Alcohol withdrawal (CMS/[facility]) 02/19/2018   • History of ORIF-RIGHT FEMUR PERIPROSTHETIC FRACTURE REPAIR OPEN REDUCTION INTERNAL FIXATION - Right 08/26/2018     Resolved Ambulatory Problems     Diagnosis Date Noted   • No Resolved Ambulatory Problems     Past Medical History:   Diagnosis Date   • Anxiety    • Degeneration of lumbar or lumbosacral intervertebral disc 6/1/2017   • Hypertension    • Low back pain    • Tremors of nervous system  "         PAST SURGICAL HISTORY  Past Surgical History:   Procedure Laterality Date   • BACK SURGERY      4 times lspine    • CATARACT EXTRACTION Bilateral    • COLONOSCOPY  12/28/2011   • JOINT REPLACEMENT      right hip          FAMILY HISTORY  Family History   Problem Relation Age of Onset   • COPD Father          SOCIAL HISTORY  Social History     Socioeconomic History   • Marital status:      Spouse name: Not on file   • Number of children: Not on file   • Years of education: Not on file   • Highest education level: Not on file   Social Needs   • Financial resource strain: Not on file   • Food insecurity - worry: Not on file   • Food insecurity - inability: Not on file   • Transportation needs - medical: Not on file   • Transportation needs - non-medical: Not on file   Occupational History   • Not on file   Tobacco Use   • Smoking status: Former Smoker     Types: Cigarettes   • Smokeless tobacco: Never Used   Substance and Sexual Activity   • Alcohol use: Yes   • Drug use: No   • Sexual activity: Defer   Other Topics Concern   • Not on file   Social History Narrative   • Not on file         ALLERGIES  Patient has no known allergies.        REVIEW OF SYSTEMS  Review of Systems   Constitutional: Negative for chills and fever.   HENT: Negative for congestion, rhinorrhea and sore throat.    Eyes: Negative for pain.   Respiratory: Negative for cough and shortness of breath.    Cardiovascular: Negative for chest pain, palpitations and leg swelling.   Gastrointestinal: Negative for abdominal pain, diarrhea, nausea and vomiting.   Genitourinary: Negative for difficulty urinating, dysuria, flank pain and frequency.   Musculoskeletal: Negative for neck pain and neck stiffness.        Left leg pain   Skin: Negative for rash.   Neurological: Negative for dizziness, speech difficulty, light-headedness and headaches.   Psychiatric/Behavioral: Negative.    All other systems reviewed and are negative.           PHYSICAL  EXAM  ED Triage Vitals [12/11/18 1806]   Temp Heart Rate Resp BP SpO2   99.8 °F (37.7 °C) 81 18 136/72 94 %      Temp src Heart Rate Source Patient Position BP Location FiO2 (%)   Oral Monitor -- -- --       Physical Exam   Constitutional: She is oriented to person, place, and time. No distress.   HENT:   Head: Normocephalic.   Mouth/Throat: Mucous membranes are normal.   Eyes: EOM are normal. Pupils are equal, round, and reactive to light.   Neck: Normal range of motion. Neck supple.   Cardiovascular: Normal rate, regular rhythm and normal heart sounds.   Pulmonary/Chest: Effort normal and breath sounds normal. No respiratory distress. She has no decreased breath sounds. She has no wheezes. She has no rhonchi. She has no rales.   Abdominal: Soft. There is no tenderness. There is no rebound and no guarding.   Musculoskeletal: She exhibits tenderness (of the medial left thigh).   Sensation is intact to light touch throughout the bilateral lower extremities. Muscle strength is symmetrical with plantar flexion and dorsiflexion. DP and PT pulses are palpable bilaterally. Right leg is shorter than the left leg (pt reports that this is chronic).      Neurological: She is alert and oriented to person, place, and time. She has normal sensation.   Skin: Skin is dry.   There is no erythema or warmth of the left leg.    Psychiatric: Mood and affect normal.   Nursing note and vitals reviewed.          LAB RESULTS  Recent Results (from the past 24 hour(s))   Comprehensive Metabolic Panel    Collection Time: 12/11/18  6:26 PM   Result Value Ref Range    Glucose 112 (H) 65 - 99 mg/dL    BUN 21 8 - 23 mg/dL    Creatinine 0.64 0.57 - 1.00 mg/dL    Sodium 138 136 - 145 mmol/L    Potassium 4.1 3.5 - 5.2 mmol/L    Chloride 101 98 - 107 mmol/L    CO2 23.3 22.0 - 29.0 mmol/L    Calcium 9.2 8.6 - 10.5 mg/dL    Total Protein 6.8 6.0 - 8.5 g/dL    Albumin 3.80 3.50 - 5.20 g/dL    ALT (SGPT) 25 1 - 33 U/L    AST (SGOT) 23 1 - 32 U/L     Alkaline Phosphatase 283 (H) 39 - 117 U/L    Total Bilirubin 0.5 0.1 - 1.2 mg/dL    eGFR Non African Amer 89 >60 mL/min/1.73    Globulin 3.0 gm/dL    A/G Ratio 1.3 g/dL    BUN/Creatinine Ratio 32.8 (H) 7.0 - 25.0    Anion Gap 13.7 mmol/L   Sedimentation Rate    Collection Time: 12/11/18  6:26 PM   Result Value Ref Range    Sed Rate 32 (H) 0 - 30 mm/hr   CBC Auto Differential    Collection Time: 12/11/18  6:26 PM   Result Value Ref Range    WBC 4.91 4.50 - 10.70 10*3/mm3    RBC 4.18 3.90 - 5.20 10*6/mm3    Hemoglobin 12.2 11.9 - 15.5 g/dL    Hematocrit 39.5 35.6 - 45.5 %    MCV 94.5 80.5 - 98.2 fL    MCH 29.2 26.9 - 32.0 pg    MCHC 30.9 (L) 32.4 - 36.3 g/dL    RDW 15.4 (H) 11.7 - 13.0 %    RDW-SD 53.0 37.0 - 54.0 fl    MPV 10.0 6.0 - 12.0 fL    Platelets 292 140 - 500 10*3/mm3    Neutrophil % 65.0 42.7 - 76.0 %    Lymphocyte % 21.0 19.6 - 45.3 %    Monocyte % 12.8 (H) 5.0 - 12.0 %    Eosinophil % 0.8 0.3 - 6.2 %    Basophil % 0.4 0.0 - 1.5 %    Immature Grans % 0.0 0.0 - 0.5 %    Neutrophils, Absolute 3.19 1.90 - 8.10 10*3/mm3    Lymphocytes, Absolute 1.03 0.90 - 4.80 10*3/mm3    Monocytes, Absolute 0.63 0.20 - 1.20 10*3/mm3    Eosinophils, Absolute 0.04 0.00 - 0.70 10*3/mm3    Basophils, Absolute 0.02 0.00 - 0.20 10*3/mm3    Immature Grans, Absolute 0.00 0.00 - 0.03 10*3/mm3   Duplex Venous Lower Extremity - Left    Collection Time: 12/11/18  7:31 PM   Result Value Ref Range    Right Common Femoral Spont Y     Right Common Femoral Phasic Y     Right Common Femoral Augment Y     Right Common Femoral Competent Y     Right Common Femoral Compress C     Left Common Femoral Spont Y     Left Common Femoral Phasic Y     Left Common Femoral Augment Y     Left Common Femoral Competent Y     Left Common Femoral Compress C     Left Saphenofemoral Junction Spont Y     Left Saphenofemoral Junction Phasic Y     Left Saphenofemoral Junction Compress C     Left Proximal Femoral Compress C     Left Mid Femoral Spont Y     Left Mid  Femoral Phasic Y     Left Mid Femoral Augment Y     Left Mid Femoral Competent Y     Left Mid Femoral Compress C     Left Distal Femoral Compress C     Left Popliteal Spont Y     Left Popliteal Phasic Y     Left Popliteal Augment Y     Left Popliteal Competent Y     Left Popliteal Compress C     Left Posterior Tibial Compress C     Left Peroneal Compress C     Left GastronemiusSoleal Compress C     Left Greater Saph AK Compress C     Left Greater Saph BK Compress C     Left Lesser Saph Compress C        Ordered the above labs and reviewed the results.        RADIOLOGY  XR Hip With or Without Pelvis 2 - 3 View Left   Final Result    AP view the pelvis and AP and frog-leg lateral views of the left hip and  AP and lateral views of the left femur were obtained. There is mild  narrowing of the left hip joint with minimal spurring along the margins  of the femoral head consistent with osteoarthritis. The left femur  appears intact. There is no evidence of fracture or dislocation on the  left. On the right a total hip arthroplasty is in place in satisfactory  position. There is an old healed fracture of the shaft of the femur  fixed in place by a metallic plate and multiple screws and cerclage  wires. The major fracture fragments appear in satisfactory alignment.      XR Femur 2 View Left   Final Result    AP view the pelvis and AP and frog-leg lateral views of the left hip and  AP and lateral views of the left femur were obtained. There is mild  narrowing of the left hip joint with minimal spurring along the margins  of the femoral head consistent with osteoarthritis. The left femur  appears intact. There is no evidence of fracture or dislocation on the  left. On the right a total hip arthroplasty is in place in satisfactory  position. There is an old healed fracture of the shaft of the femur  fixed in place by a metallic plate and multiple screws and cerclage  wires. The major fracture fragments appear in satisfactory  alignment.           LLE Venous Doppler: Negative for acute DVT.     Ordered the above noted radiological studies. Reviewed by me in PACS.        PROCEDURES  Procedures          MEDICATIONS GIVEN IN ER  Medications - No data to display          PROGRESS AND CONSULTS  ED Course as of Dec 11 2132   Tue Dec 11, 2018   1955 7:55 PM  Patient with left thigh pain of unclear etiology.  Pain worse with movement and palpation of medial thigh.  Has normal pulse.  Doppler negative.  Xrays show no fracture or dislocation.  WBC normal.  No redness or specific hip tenderness to suggest infection.  Patient has dementia and cannot give a history.  May be from PT.  May have been a fall.  No abdominal tenderness. Discussed options with family and they will take her back to assisted living.  Has a wheelchair.  Does poorly with pain meds so will use tylenol.  [SL]      ED Course User Index  [SL] Manny Hernandez MD       6:22 PM:  Left femur X-Ray, left hip X-Ray, LLE venous doppler, blood work, and sed rate ordered for further evaluation.    7:45 PM:  Rechecked pt. Pt is resting comfortably and appears in no acute distress. Informed pt and family that pt's left femur X-Ray and left hip X-Ray are negative acute. Pt's LLE venous doppler is negative for acute DVT. Family reports that pt has a wheelchair for mobility, which pt was strongly advised to use. Also advised family to have pt f/u with her PMD. RTER warnings given. Pt and family agree with plan for discharge.            MEDICAL DECISION MAKING      MDM  Number of Diagnoses or Management Options     Amount and/or Complexity of Data Reviewed  Clinical lab tests: ordered and reviewed (WBC is 4.91.)  Tests in the radiology section of CPT®: ordered and reviewed (Left hip/left femur X-Rays: AP view the pelvis and AP and frog-leg lateral views of the left hip and  AP and lateral views of the left femur were obtained. There is mild  narrowing of the left hip joint with minimal spurring  along the margins  of the femoral head consistent with osteoarthritis. The left femur  appears intact. There is no evidence of fracture or dislocation on the  left. On the right a total hip arthroplasty is in place in satisfactory  position. There is an old healed fracture of the shaft of the femur  fixed in place by a metallic plate and multiple screws and cerclage  wires. The major fracture fragments appear in satisfactory alignment.)  Decide to obtain previous medical records or to obtain history from someone other than the patient: yes    Patient Progress  Patient progress: stable             DIAGNOSIS  Final diagnoses:   Left leg pain           DISPOSITION  Pt discharged.    DISCHARGE    Patient discharged in stable condition.    Reviewed implications of results, diagnosis, meds, responsibility to follow up, warning signs and symptoms of possible worsening, potential complications and reasons to return to ER.    Patient/Family voiced understanding of above instructions.    Discussed plan for discharge, as there is no emergent indication for admission. Pt/family is agreeable and understands need for follow up and repeat testing. Pt is aware that discharge does not mean that nothing is wrong but it indicates no emergency is present that requires admission and they must continue care with follow-up as given below or physician of their choice.     FOLLOW-UP  Huong Ascencio MD  [address] [place] [address]  [phone]    Schedule an appointment as soon as possible for a visit            Medication List      Changed    citalopram 40 MG tablet  Commonly known as:  CELEXA  Take 1 tablet by mouth daily.  What changed:  how much to take            Latest Documented Vital Signs:  As of 8:01 PM  BP- 139/72 HR- 80 Temp- 99.8 °F (37.7 °C) (Oral) O2 sat- 94%        --  Documentation assistance provided by vicenta Vazquez for Dr. David MD.  Information recorded by the vicenta was done at my  direction and has been verified and validated by me.       Dharmesh Vazquez  12/11/18 2132       Manny Hernandez MD  12/11/18 2134

## 2018-12-12 NOTE — ED NOTES
Pt c/o pain of her L upper leg with palpation and with bending of knee or hip. No skin issues noted.      Kenyetta Kendall RN  12/11/18 1925

## 2019-02-20 ENCOUNTER — OFFICE VISIT (OUTPATIENT)
Dept: ORTHOPEDIC SURGERY | Facility: CLINIC | Age: 84
End: 2019-02-20

## 2019-02-20 VITALS — TEMPERATURE: 98 F | HEIGHT: 67 IN | WEIGHT: 130 LBS | BODY MASS INDEX: 20.4 KG/M2

## 2019-02-20 DIAGNOSIS — Z98.890 HISTORY OF OPEN REDUCTION AND INTERNAL FIXATION (ORIF) PROCEDURE: Primary | ICD-10-CM

## 2019-02-20 PROCEDURE — 73552 X-RAY EXAM OF FEMUR 2/>: CPT | Performed by: ORTHOPAEDIC SURGERY

## 2019-02-20 PROCEDURE — 99213 OFFICE O/P EST LOW 20 MIN: CPT | Performed by: ORTHOPAEDIC SURGERY

## 2019-08-28 ENCOUNTER — HOSPITAL ENCOUNTER (EMERGENCY)
Facility: HOSPITAL | Age: 84
Discharge: HOME | End: 2019-08-28
Attending: EMERGENCY MEDICINE | Admitting: EMERGENCY MEDICINE

## 2019-08-28 VITALS
BODY MASS INDEX: 21.19 KG/M2 | OXYGEN SATURATION: 96 % | RESPIRATION RATE: 20 BRPM | WEIGHT: 135 LBS | DIASTOLIC BLOOD PRESSURE: 81 MMHG | HEIGHT: 67 IN | SYSTOLIC BLOOD PRESSURE: 139 MMHG | HEART RATE: 73 BPM | TEMPERATURE: 97.5 F
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
## 2019-08-28 PROCEDURE — 99283 EMERGENCY DEPT VISIT LOW MDM: CPT

## 2019-09-25 ENCOUNTER — HOSPITAL ENCOUNTER (EMERGENCY)
Facility: HOSPITAL | Age: 84
Discharge: HOME | End: 2019-09-26
Attending: EMERGENCY MEDICINE | Admitting: EMERGENCY MEDICINE

## 2019-09-25 ENCOUNTER — APPOINTMENT (OUTPATIENT)
Dept: GENERAL RADIOLOGY | Facility: HOSPITAL | Age: 84
End: 2019-09-25

## 2019-09-25 DIAGNOSIS — R63.0 LOSS OF APPETITE: Primary | ICD-10-CM

## 2019-09-25 DIAGNOSIS — R11.2 NON-INTRACTABLE VOMITING WITH NAUSEA, UNSPECIFIED VOMITING TYPE: ICD-10-CM

## 2019-09-25 DIAGNOSIS — K52.9 CHRONIC DIARRHEA: ICD-10-CM

## 2019-09-25 LAB — D-LACTATE SERPL-SCNC: 0.9 MMOL/L (ref 0.5–2)

## 2019-09-25 PROCEDURE — 93010 ELECTROCARDIOGRAM REPORT: CPT | Performed by: INTERNAL MEDICINE

## 2019-09-25 PROCEDURE — 99283 EMERGENCY DEPT VISIT LOW MDM: CPT

## 2019-09-25 PROCEDURE — 84145 PROCALCITONIN (PCT): CPT | Performed by: PHYSICIAN ASSISTANT

## 2019-09-25 PROCEDURE — 83605 ASSAY OF LACTIC ACID: CPT | Performed by: PHYSICIAN ASSISTANT

## 2019-09-25 PROCEDURE — 80053 COMPREHEN METABOLIC PANEL: CPT | Performed by: EMERGENCY MEDICINE

## 2019-09-25 PROCEDURE — 93005 ELECTROCARDIOGRAM TRACING: CPT | Performed by: EMERGENCY MEDICINE

## 2019-09-25 PROCEDURE — 83880 ASSAY OF NATRIURETIC PEPTIDE: CPT | Performed by: PHYSICIAN ASSISTANT

## 2019-09-25 PROCEDURE — 83690 ASSAY OF LIPASE: CPT | Performed by: EMERGENCY MEDICINE

## 2019-09-25 PROCEDURE — 84484 ASSAY OF TROPONIN QUANT: CPT | Performed by: EMERGENCY MEDICINE

## 2019-09-25 PROCEDURE — 85025 COMPLETE CBC W/AUTO DIFF WBC: CPT | Performed by: EMERGENCY MEDICINE

## 2019-09-25 PROCEDURE — 71046 X-RAY EXAM CHEST 2 VIEWS: CPT

## 2019-09-25 RX ORDER — FAMOTIDINE 20 MG/1
20 TABLET, FILM COATED ORAL 2 TIMES DAILY
COMMUNITY
End: 2020-08-07

## 2019-09-26 ENCOUNTER — APPOINTMENT (OUTPATIENT)
Dept: CT IMAGING | Facility: HOSPITAL | Age: 84
End: 2019-09-26

## 2019-09-26 VITALS
HEIGHT: 70 IN | BODY MASS INDEX: 19.14 KG/M2 | DIASTOLIC BLOOD PRESSURE: 68 MMHG | RESPIRATION RATE: 16 BRPM | TEMPERATURE: 97.5 F | OXYGEN SATURATION: 97 % | SYSTOLIC BLOOD PRESSURE: 124 MMHG | HEART RATE: 79 BPM | WEIGHT: 133.7 LBS

## 2019-09-26 LAB
ALBUMIN SERPL-MCNC: 4.6 G/DL (ref 3.5–5.2)
ALBUMIN/GLOB SERPL: 1.8 G/DL
ALP SERPL-CCNC: 113 U/L (ref 39–117)
ALT SERPL W P-5'-P-CCNC: 17 U/L (ref 1–33)
ANION GAP SERPL CALCULATED.3IONS-SCNC: 10.2 MMOL/L (ref 5–15)
AST SERPL-CCNC: 20 U/L (ref 1–32)
BACTERIA UR QL AUTO: ABNORMAL /HPF
BASOPHILS # BLD AUTO: 0.07 10*3/MM3 (ref 0–0.2)
BASOPHILS NFR BLD AUTO: 1.5 % (ref 0–1.5)
BILIRUB SERPL-MCNC: 0.3 MG/DL (ref 0.2–1.2)
BILIRUB UR QL STRIP: NEGATIVE
BUN BLD-MCNC: 15 MG/DL (ref 8–23)
BUN/CREAT SERPL: 21.1 (ref 7–25)
CALCIUM SPEC-SCNC: 9 MG/DL (ref 8.6–10.5)
CHLORIDE SERPL-SCNC: 100 MMOL/L (ref 98–107)
CLARITY UR: CLEAR
CO2 SERPL-SCNC: 28.8 MMOL/L (ref 22–29)
COLOR UR: YELLOW
CREAT BLD-MCNC: 0.71 MG/DL (ref 0.57–1)
DEPRECATED RDW RBC AUTO: 44.8 FL (ref 37–54)
EOSINOPHIL # BLD AUTO: 0.23 10*3/MM3 (ref 0–0.4)
EOSINOPHIL NFR BLD AUTO: 4.9 % (ref 0.3–6.2)
ERYTHROCYTE [DISTWIDTH] IN BLOOD BY AUTOMATED COUNT: 13.5 % (ref 12.3–15.4)
GFR SERPL CREATININE-BSD FRML MDRD: 79 ML/MIN/1.73
GLOBULIN UR ELPH-MCNC: 2.6 GM/DL
GLUCOSE BLD-MCNC: 103 MG/DL (ref 65–99)
GLUCOSE UR STRIP-MCNC: NEGATIVE MG/DL
HCT VFR BLD AUTO: 46.4 % (ref 34–46.6)
HGB BLD-MCNC: 14.9 G/DL (ref 12–15.9)
HGB UR QL STRIP.AUTO: NEGATIVE
HYALINE CASTS UR QL AUTO: ABNORMAL /LPF
IMM GRANULOCYTES # BLD AUTO: 0.02 10*3/MM3 (ref 0–0.05)
IMM GRANULOCYTES NFR BLD AUTO: 0.4 % (ref 0–0.5)
KETONES UR QL STRIP: NEGATIVE
LEUKOCYTE ESTERASE UR QL STRIP.AUTO: ABNORMAL
LIPASE SERPL-CCNC: 29 U/L (ref 13–60)
LYMPHOCYTES # BLD AUTO: 1.36 10*3/MM3 (ref 0.7–3.1)
LYMPHOCYTES NFR BLD AUTO: 29.1 % (ref 19.6–45.3)
MCH RBC QN AUTO: 28.8 PG (ref 26.6–33)
MCHC RBC AUTO-ENTMCNC: 32.1 G/DL (ref 31.5–35.7)
MCV RBC AUTO: 89.6 FL (ref 79–97)
MONOCYTES # BLD AUTO: 0.44 10*3/MM3 (ref 0.1–0.9)
MONOCYTES NFR BLD AUTO: 9.4 % (ref 5–12)
NEUTROPHILS # BLD AUTO: 2.56 10*3/MM3 (ref 1.7–7)
NEUTROPHILS NFR BLD AUTO: 54.7 % (ref 42.7–76)
NITRITE UR QL STRIP: NEGATIVE
NRBC BLD AUTO-RTO: 0 /100 WBC (ref 0–0.2)
NT-PROBNP SERPL-MCNC: 83.1 PG/ML (ref 5–1800)
PH UR STRIP.AUTO: <=5 [PH] (ref 5–8)
PLATELET # BLD AUTO: 272 10*3/MM3 (ref 140–450)
PMV BLD AUTO: 10.3 FL (ref 6–12)
POTASSIUM BLD-SCNC: 5.1 MMOL/L (ref 3.5–5.2)
PROCALCITONIN SERPL-MCNC: 0.05 NG/ML (ref 0.1–0.25)
PROT SERPL-MCNC: 7.2 G/DL (ref 6–8.5)
PROT UR QL STRIP: NEGATIVE
RBC # BLD AUTO: 5.18 10*6/MM3 (ref 3.77–5.28)
RBC # UR: ABNORMAL /HPF
REF LAB TEST METHOD: ABNORMAL
SODIUM BLD-SCNC: 139 MMOL/L (ref 136–145)
SP GR UR STRIP: 1.02 (ref 1–1.03)
SQUAMOUS #/AREA URNS HPF: ABNORMAL /HPF
TROPONIN T SERPL-MCNC: <0.01 NG/ML (ref 0–0.03)
UROBILINOGEN UR QL STRIP: ABNORMAL
WBC NRBC COR # BLD: 4.68 10*3/MM3 (ref 3.4–10.8)
WBC UR QL AUTO: ABNORMAL /HPF

## 2019-09-26 PROCEDURE — 25010000002 IOPAMIDOL 61 % SOLUTION: Performed by: EMERGENCY MEDICINE

## 2019-09-26 PROCEDURE — 74177 CT ABD & PELVIS W/CONTRAST: CPT

## 2019-09-26 PROCEDURE — 81001 URINALYSIS AUTO W/SCOPE: CPT | Performed by: EMERGENCY MEDICINE

## 2019-09-26 RX ORDER — ONDANSETRON 4 MG/1
4 TABLET, ORALLY DISINTEGRATING ORAL EVERY 8 HOURS PRN
Qty: 15 TABLET | Refills: 0 | Status: SHIPPED | OUTPATIENT
Start: 2019-09-26 | End: 2020-08-07

## 2019-09-26 RX ADMIN — IOPAMIDOL 85 ML: 612 INJECTION, SOLUTION INTRAVENOUS at 01:00

## 2019-10-15 ENCOUNTER — OFFICE VISIT (OUTPATIENT)
Dept: GASTROENTEROLOGY | Facility: CLINIC | Age: 84
End: 2019-10-15

## 2019-10-15 VITALS
SYSTOLIC BLOOD PRESSURE: 124 MMHG | WEIGHT: 121.2 LBS | TEMPERATURE: 98.2 F | BODY MASS INDEX: 17.35 KG/M2 | DIASTOLIC BLOOD PRESSURE: 78 MMHG | HEIGHT: 70 IN

## 2019-10-15 DIAGNOSIS — R63.0 ANOREXIA: ICD-10-CM

## 2019-10-15 DIAGNOSIS — K80.20 CALCULUS OF GALLBLADDER WITHOUT CHOLECYSTITIS WITHOUT OBSTRUCTION: ICD-10-CM

## 2019-10-15 DIAGNOSIS — R63.4 WEIGHT LOSS: ICD-10-CM

## 2019-10-15 DIAGNOSIS — R11.0 NAUSEA: ICD-10-CM

## 2019-10-15 DIAGNOSIS — R19.7 DIARRHEA, UNSPECIFIED TYPE: Primary | ICD-10-CM

## 2019-10-15 DIAGNOSIS — F03.90 DEMENTIA WITHOUT BEHAVIORAL DISTURBANCE, UNSPECIFIED DEMENTIA TYPE: ICD-10-CM

## 2019-10-15 PROCEDURE — 99203 OFFICE O/P NEW LOW 30 MIN: CPT | Performed by: INTERNAL MEDICINE

## 2019-10-15 RX ORDER — TIOTROPIUM BROMIDE AND OLODATEROL 3.124; 2.736 UG/1; UG/1
2 SPRAY, METERED RESPIRATORY (INHALATION)
Refills: 11 | COMMUNITY
Start: 2019-09-20 | End: 2020-02-26

## 2019-10-15 RX ORDER — CHOLESTYRAMINE 4 G/9G
4 POWDER, FOR SUSPENSION ORAL 2 TIMES DAILY
Refills: 2 | COMMUNITY
Start: 2019-09-30 | End: 2020-02-26

## 2019-10-15 NOTE — PROGRESS NOTES
Chief Complaint   Patient presents with   • Nausea   • Vomiting   • Diarrhea   • Fecal Incontinence   • Anorexia     Subjective      HPI     Alona Cisneros is a 83 y.o. female who presents for evaluation of diarrhea.    Pt with fairly advanced dementia, she is accompanied by daughter who provides hx.  Symptoms include fecal urgency and post-prandial diarrhea, sometimes explosive.  Daughter has noticed general lack of appetite with some weight loss - pt forgets to eat unless prompted.  No complaints of abdominal pain.     CT scan A/P from 9/2019 showed cholelithiasis    Recently started on questran by PCP and has gone on dairy reduced diet with improvement in symptoms.   Hx of regular EtOH consumption - Drinking 1/5th week up until recently    Hx of COPD - takes inhaler but no oxygen requirements      Past Medical History:   Diagnosis Date   • Anxiety    • COPD (chronic obstructive pulmonary disease) (CMS/HCC)    • Degeneration of lumbar or lumbosacral intervertebral disc 6/1/2017   • Emphysema lung (CMS/[facility])    • Hypertension    • Low back pain    • Nausea    • Rectal prolapse    • Tremors of nervous system        Current Outpatient Medications:   •  albuterol (VENTOLIN HFA) 108 (90 BASE) MCG/ACT inhaler, Inhale 2 puffs Every 4 (Four) Hours As Needed for wheezing., Disp: 1 inhaler, Rfl: 11  •  amLODIPine (NORVASC) 5 MG tablet, Take 2.5 mg by mouth Daily. family states takes 1/2 tab, Disp: , Rfl:   •  cholestyramine (QUESTRAN) 4 g packet, Take 4 g by mouth 2 (Two) Times a Day., Disp: , Rfl: 2  •  citalopram (CELEXA) 40 MG tablet, Take 1 tablet by mouth daily. (Patient taking differently: Take 20 mg by mouth Daily.), Disp: 90 tablet, Rfl: 3  •  donepezil (ARICEPT) 5 MG tablet, Take 10 mg by mouth Every Night., Disp: , Rfl:   •  famotidine (PEPCID) 20 MG tablet, Take 20 mg by mouth 2 (Two) Times a Day., Disp: , Rfl:   •  mirtazapine (REMERON) 7.5 MG half tablet, Take 15 mg by mouth Every Night., Disp: , Rfl:   •   ondansetron ODT (ZOFRAN ODT) 4 MG disintegrating tablet, Take 1 tablet by mouth Every 8 (Eight) Hours As Needed for Nausea or Vomiting., Disp: 15 tablet, Rfl: 0  •  primidone (MYSOLINE) 50 MG tablet, Take 150 mg by mouth Every Morning., Disp: , Rfl:   •  STIOLTO RESPIMAT 2.5-2.5 MCG/ACT aerosol solution inhaler, Inhale 2 puffs every night at bedtime., Disp: , Rfl: 11  •  aspirin 81 MG chewable tablet, Chew 81 mg daily., Disp: , Rfl:   •  Calcium Citrate-Vitamin D (CALCIUM CITRATE + PO), Take  by mouth Daily., Disp: , Rfl:   •  calcium polycarbophil (FIBERCON) 625 MG tablet, Take 625 mg by mouth Daily., Disp: , Rfl:   •  docusate sodium 100 MG capsule, Take 100 mg by mouth 2 (Two) Times a Day., Disp: , Rfl:   •  HYDROcodone-acetaminophen (NORCO) 5-325 MG per tablet, Take 1-2 tabs po q 4 hours prn pain, Disp: 20 tablet, Rfl: 0  •  Multiple Vitamin (MV-ONE PO), Take 1 tablet by mouth daily., Disp: , Rfl:   •  Omega-3 Fatty Acids (FISH OIL) 1000 MG capsule capsule, Take  by mouth Daily With Breakfast., Disp: , Rfl:   •  polyethylene glycol (MIRALAX) packet, Take 17 g by mouth Daily., Disp: , Rfl:   No Known Allergies  Social History     Socioeconomic History   • Marital status:      Spouse name: Not on file   • Number of children: Not on file   • Years of education: Not on file   • Highest education level: Not on file   Tobacco Use   • Smoking status: Former Smoker     Types: Cigarettes   • Smokeless tobacco: Never Used   Substance and Sexual Activity   • Alcohol use: Yes   • Drug use: No   • Sexual activity: Defer     Family History   Problem Relation Age of Onset   • COPD Father      Review of Systems   Constitutional: Positive for appetite change and unexpected weight change.   Gastrointestinal: Positive for diarrhea.     Objective   Vitals:    10/15/19 1426   BP: 124/78   Temp: 98.2 °F (36.8 °C)     Physical Exam  Assessment/Plan   Assessment:     1. Diarrhea, unspecified type    2. Nausea    3. Anorexia     4. Calculus of gallbladder without cholecystitis without obstruction    5. Weight loss    6. Dementia without behavioral disturbance, unspecified dementia type (CMS/HCC)      Plan:   This is a very pleasantly demented elderly lady who has various gastrointestinal symptoms with some associated weight loss.  The exact etiology is unclear.  I suspect that much of her anorexia and weight loss is related to her advanced dementia.  With regards to her nausea and postprandial diarrhea she does have evidence of cholelithiasis on recent CT scan and has had some improvement with initiation of Questran.  It would be hard to recommend referral for cholecystectomy however at this time without ruling out other potential causes for her symptoms.  I did discuss with her daughter that usually in this setting we would recommend EGD and colonoscopy for further evaluation.  Obviously we are all reluctant to put this patient through that at this time and given her recent improvement would like to proceed with conservative management for the time being.            Jatinder Rizvi M.D.  [facility]  [address] [place] [address]  Office: [phone]

## 2020-02-26 ENCOUNTER — OFFICE VISIT (OUTPATIENT)
Dept: ORTHOPEDIC SURGERY | Facility: CLINIC | Age: 85
End: 2020-02-26

## 2020-02-26 VITALS — TEMPERATURE: 98.1 F | HEIGHT: 70 IN | WEIGHT: 156 LBS | BODY MASS INDEX: 22.33 KG/M2

## 2020-02-26 DIAGNOSIS — Z98.890 HISTORY OF OPEN REDUCTION AND INTERNAL FIXATION (ORIF) PROCEDURE: Primary | ICD-10-CM

## 2020-02-26 PROCEDURE — 73502 X-RAY EXAM HIP UNI 2-3 VIEWS: CPT | Performed by: ORTHOPAEDIC SURGERY

## 2020-02-26 PROCEDURE — 99213 OFFICE O/P EST LOW 20 MIN: CPT | Performed by: ORTHOPAEDIC SURGERY

## 2020-02-26 PROCEDURE — 73560 X-RAY EXAM OF KNEE 1 OR 2: CPT | Performed by: ORTHOPAEDIC SURGERY

## 2020-02-26 RX ORDER — ALBUTEROL SULFATE 1.25 MG/3ML
SOLUTION RESPIRATORY (INHALATION)
COMMUNITY
Start: 2019-10-30

## 2020-02-26 RX ORDER — AMLODIPINE BESYLATE 2.5 MG/1
2.5 TABLET ORAL DAILY
COMMUNITY
Start: 2020-01-18

## 2020-02-26 RX ORDER — BUDESONIDE 0.25 MG/2ML
INHALANT ORAL
Status: ON HOLD | COMMUNITY
Start: 2019-10-30 | End: 2021-01-02

## 2020-02-26 RX ORDER — ARFORMOTEROL TARTRATE 15 UG/2ML
15 SOLUTION RESPIRATORY (INHALATION)
COMMUNITY
Start: 2019-10-30

## 2020-02-26 RX ORDER — BUDESONIDE 0.5 MG/2ML
INHALANT ORAL
COMMUNITY
Start: 2020-02-01

## 2020-02-26 NOTE — PROGRESS NOTES
FOLLOW UP VISIT    Patient: Alona Cisneros  ?  YOB: 1935    MRN: [id number]  ?  CC: Follow-up on right total hip arthroplasty from 2015 and a right distal femoral periprosthetic fracture.  ?  HPI:F/U ORIF-RIGHT FEMUR PERIPROSTHETIC FRACTURE REPAIR OPEN REDUCTION INTERNAL FIXATION - Right       Alona Gutierrez patient is doing quite well at this point.  She is 2 years out from open reduction internal fixation with plate and screws for a distal femoral fracture.  The patient does not have a clinical deformity.  There is no shortening of the lower extremity.  She does not have any pain in the hip which is now 4 years post op.  She ambulates with the assistance of a walker.  She is fairly independent with her activities of daily living despite the fact that she is 84 years of age.  She does not have any implant related issues.      Pain Location: right hip  Radiation: none  Quality: dull, aching  Intensity/Severity: moderate  Duration: 2 years  Onset quality: gradual   Timing: intermittent  Aggravating Factors: rising after sitting, squatting  Alleviating Factors: NSAIDs  Previous Episodes: none mentioned  Associated Symptoms: pain, swelling, decreased ROM  ADLs Affected: ambulating  Previous Treatment: Plate fixation of the distal femoral periprosthetic fracture 2 years out.    This patient is an established patient.  This problem is not new to this examiner.      Allergies: No Known Allergies    Medications:   Home Medications:  Current Outpatient Medications on File Prior to Visit   Medication Sig   • albuterol (ACCUNEB) 1.25 MG/3ML nebulizer solution    • amLODIPine (NORVASC) 2.5 MG tablet Take 2.5 mg by mouth Daily.   • amLODIPine (NORVASC) 5 MG tablet Take 2.5 mg by mouth Daily. family states takes 1/2 tab   • arformoterol (BROVANA) 15 MCG/2ML nebulizer solution    • aspirin 81 MG chewable tablet Chew 81 mg daily.   • budesonide (PULMICORT) 0.25 MG/2ML nebulizer solution    • budesonide  (PULMICORT) 0.5 MG/2ML nebulizer solution USE 1 VIAL IN NEBULIZER TWICE DAILY   • citalopram (CELEXA) 40 MG tablet Take 1 tablet by mouth daily. (Patient taking differently: Take 20 mg by mouth Daily.)   • donepezil (ARICEPT) 5 MG tablet Take 10 mg by mouth Every Night.   • famotidine (PEPCID) 20 MG tablet Take 20 mg by mouth 2 (Two) Times a Day.   • mirtazapine (REMERON) 7.5 MG half tablet Take 15 mg by mouth Every Night.   • Multiple Vitamin (MV-ONE PO) Take 1 tablet by mouth daily.   • ondansetron ODT (ZOFRAN ODT) 4 MG disintegrating tablet Take 1 tablet by mouth Every 8 (Eight) Hours As Needed for Nausea or Vomiting.   • primidone (MYSOLINE) 50 MG tablet Take 150 mg by mouth Every Morning.   • [DISCONTINUED] albuterol (VENTOLIN HFA) 108 (90 BASE) MCG/ACT inhaler Inhale 2 puffs Every 4 (Four) Hours As Needed for wheezing.   • [DISCONTINUED] Calcium Citrate-Vitamin D (CALCIUM CITRATE + PO) Take  by mouth Daily.   • [DISCONTINUED] calcium polycarbophil (FIBERCON) 625 MG tablet Take 625 mg by mouth Daily.   • [DISCONTINUED] cholestyramine (QUESTRAN) 4 g packet Take 4 g by mouth 2 (Two) Times a Day.   • [DISCONTINUED] docusate sodium 100 MG capsule Take 100 mg by mouth 2 (Two) Times a Day.   • [DISCONTINUED] HYDROcodone-acetaminophen (NORCO) 5-325 MG per tablet Take 1-2 tabs po q 4 hours prn pain   • [DISCONTINUED] Omega-3 Fatty Acids (FISH OIL) 1000 MG capsule capsule Take  by mouth Daily With Breakfast.   • [DISCONTINUED] polyethylene glycol (MIRALAX) packet Take 17 g by mouth Daily.   • [DISCONTINUED] STIOLTO RESPIMAT 2.5-2.5 MCG/ACT aerosol solution inhaler Inhale 2 puffs every night at bedtime.     No current facility-administered medications on file prior to visit.      Current Medications:  Scheduled Meds:  PRN Meds:.    I have reviewed the patient's medical history in detail and updated the computerized patient record.  Review and summarization of old records include:    Past Medical History:   Diagnosis Date  "  • Anxiety    • COPD (chronic obstructive pulmonary disease) (CMS/[facility])    • Degeneration of lumbar or lumbosacral intervertebral disc 6/1/2017   • Emphysema lung (CMS/[facility])    • Hypertension    • Low back pain    • Nausea    • Rectal prolapse    • Tremors of nervous system      Past Surgical History:   Procedure Laterality Date   • BACK SURGERY      4 times lspine    • CATARACT EXTRACTION Bilateral    • COLONOSCOPY  12/28/2011   • FEMUR PERIPROSTHETIC FRACTURE REPAIR Right 2/18/2018    Procedure: RIGHT FEMUR PERIPROSTHETIC FRACTURE REPAIR OPEN REDUCTION INTERNAL FIXATION ;  Surgeon: Nima Rausch MD;  Location: [facility] OR;  Service:    • JOINT REPLACEMENT      right hip      Social History     Occupational History   • Not on file   Tobacco Use   • Smoking status: Former Smoker     Types: Cigarettes   • Smokeless tobacco: Never Used   Substance and Sexual Activity   • Alcohol use: Yes   • Drug use: No   • Sexual activity: Defer      Family History   Problem Relation Age of Onset   • COPD Father          Review of Systems   Constitutional: Negative.  Negative for fever.   HENT: Negative.    Eyes: Negative.    Respiratory: Negative.    Cardiovascular: Negative.    Endocrine: Negative.    Genitourinary: Negative.    Musculoskeletal: Positive for arthralgias, gait problem and joint swelling.   Skin: Negative.  Negative for rash and wound.   Allergic/Immunologic: Negative.    Neurological: Negative for numbness.   Hematological: Negative.    Psychiatric/Behavioral: Negative.           Wt Readings from Last 3 Encounters:   02/26/20 70.8 kg (156 lb)   10/15/19 55 kg (121 lb 3.2 oz)   09/25/19 60.6 kg (133 lb 11.2 oz)     Ht Readings from Last 3 Encounters:   02/26/20 177.8 cm (70\")   10/15/19 177.8 cm (70\")   09/25/19 177.8 cm (70\")     Body mass index is 22.38 kg/m².  Facility age limit for growth percentiles is 20 years.  Vitals:    02/26/20 1406   Temp: 98.1 °F (36.7 °C)         Physical Exam  Constitutional: Patient " is oriented to person, place, and time. Appears well-developed and well-nourished.   HENT:   Head: Normocephalic and atraumatic.   Eyes: Conjunctivae and EOM are normal. Pupils are equal, round, and reactive to light.   Cardiovascular: Normal rate, regular rhythm, normal heart sounds and intact distal pulses.   Pulmonary/Chest: Effort normal and breath sounds normal.   Musculoskeletal:   See detailed exam below   Neurological: Alert and oriented to person, place, and time. No sensory deficit. Coordination normal.   Skin: Skin is warm and dry. Capillary refill takes less than 2 seconds. No rash noted. No erythema.   Psychiatric: Patient has a normal mood and affect. Her behavior is normal. Judgment and thought content normal.   Nursing note and vitals reviewed.      Ortho Exam:     Right hip. Patient is post op 5 year(s) from total hip arthoplasty, direct anterior. Incision is clean and healing well. Calf is soft and nontender. Homans sign is negative. Skin and soft tissues are appropriate for postoperative status of the patient. Hip flexion is 0-80 degrees, hip abduction is 0-30 degrees. No limb lenth discrepancy. Neurovascular status is intact. Patients gait is significantly improved. The pain relief is extremely dramatic for the patient. Dorsalis pedis and posterior tibial artery pulses palpable. Common peroneal function is well preserved. There is no evidence of cellulitis or erythema or deep seated joint infection.  The femur fracture has completely healed.  There is no clinical deformity.  There is no abnormal mobility at the fracture site.  No implant related problems are reported by the patient.  Knee range of motion is well-preserved.      Diagnostics:  xrays obtained today  right Knee X-Ray  Indication: Elevation of healing of a distal femoral periprosthetic fracture.  AP, Lateral views  Findings: Anatomically reduced fracture with callus formation noted.  no bony lesion  Soft tissues within normal  limits  within normal limits joint spaces  Hardware appropriately positioned yes      yes prior studies available for comparison.    This patient's x-ray report was graded according to the Kellgren and Ashvin classification.  This took into account the joint space narrowing, osteophyte formation, sclerosis of the distal femur/proximal tibia along with deformity of those bones.  The findings were indicative of K L grade 2.    X-RAY was ordered and reviewed by Nima Rausch MD    right Hip X-Ray  Indication: Evaluation of implant position after total hip arthroplasty.  AP and lateral  Findings: Well-placed hip replacement without any subsidence of the implants.  no bony lesion  Soft tissues within normal limits  within normal limits joint spaces  Hardware appropriately positioned yes      yes prior studies available for comparison.    X-RAY was ordered and reviewed by Nima Rausch MD      Assessment:  Alona was seen today for follow-up and pain.    Diagnoses and all orders for this visit:    History of ORIF-RIGHT FEMUR PERIPROSTHETIC FRACTURE REPAIR OPEN REDUCTION INTERNAL FIXATION - Right  -     XR Hip With or Without Pelvis 2 - 3 View Right  -     XR Knee 1 or 2 View Right          Procedures  ?    Plan    · Compression/brace to the need to prevent it from buckling and giving out.  · Use a walker for assistance with ambulation.  · Falls precautions.  · Calcium and vitamin D for bone health.  · , GI and dental procedure prophylaxis with antibiotics to prevent metastatic infection of the hip arthroplasty implants.  · Rest, ice, compression, and elevation (RICE) therapy  · Stretching and strengthening exercises quads and the hamstrings.  · Tylenol 500-1000mg by mouth every 6 hours as needed for pain   · Follow up in 1 year(s)    Date of encounter: 02/26/2020   Nima Rausch MD

## 2020-04-02 ENCOUNTER — HOSPITAL ENCOUNTER (EMERGENCY)
Facility: HOSPITAL | Age: 85
Discharge: NURSING, REHAB OR LONG-TERM CARE | End: 2020-04-02
Attending: EMERGENCY MEDICINE | Admitting: EMERGENCY MEDICINE

## 2020-04-02 VITALS
SYSTOLIC BLOOD PRESSURE: 138 MMHG | HEART RATE: 84 BPM | DIASTOLIC BLOOD PRESSURE: 83 MMHG | OXYGEN SATURATION: 97 % | TEMPERATURE: 98.2 F | RESPIRATION RATE: 24 BRPM

## 2020-04-02 DIAGNOSIS — N39.0 ACUTE URINARY TRACT INFECTION: ICD-10-CM
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
## 2020-04-02 DIAGNOSIS — R53.1 GENERALIZED WEAKNESS: ICD-10-CM

## 2020-04-02 LAB
ALBUMIN SERPL-MCNC: 4.1 G/DL (ref 3.5–5.2)
ALBUMIN/GLOB SERPL: 1.5 G/DL
ALP SERPL-CCNC: 90 U/L (ref 39–117)
ALT SERPL W P-5'-P-CCNC: 39 U/L (ref 1–33)
ANION GAP SERPL CALCULATED.3IONS-SCNC: 11.9 MMOL/L (ref 5–15)
AST SERPL-CCNC: 31 U/L (ref 1–32)
BACTERIA UR QL AUTO: ABNORMAL /HPF
BASOPHILS # BLD AUTO: 0.04 10*3/MM3 (ref 0–0.2)
BASOPHILS NFR BLD AUTO: 0.7 % (ref 0–1.5)
BILIRUB SERPL-MCNC: 0.5 MG/DL (ref 0.2–1.2)
BILIRUB UR QL STRIP: NEGATIVE
BUN BLD-MCNC: 16 MG/DL (ref 8–23)
BUN/CREAT SERPL: 16.2 (ref 7–25)
CALCIUM SPEC-SCNC: 8.8 MG/DL (ref 8.6–10.5)
CHLORIDE SERPL-SCNC: 94 MMOL/L (ref 98–107)
CLARITY UR: ABNORMAL
CO2 SERPL-SCNC: 25.1 MMOL/L (ref 22–29)
COLOR UR: YELLOW
CREAT BLD-MCNC: 0.99 MG/DL (ref 0.57–1)
DEPRECATED RDW RBC AUTO: 44.1 FL (ref 37–54)
EOSINOPHIL # BLD AUTO: 0.15 10*3/MM3 (ref 0–0.4)
EOSINOPHIL NFR BLD AUTO: 2.5 % (ref 0.3–6.2)
ERYTHROCYTE [DISTWIDTH] IN BLOOD BY AUTOMATED COUNT: 13.1 % (ref 12.3–15.4)
GFR SERPL CREATININE-BSD FRML MDRD: 54 ML/MIN/1.73
GLOBULIN UR ELPH-MCNC: 2.8 GM/DL
GLUCOSE BLD-MCNC: 113 MG/DL (ref 65–99)
GLUCOSE UR STRIP-MCNC: NEGATIVE MG/DL
HCT VFR BLD AUTO: 39.9 % (ref 34–46.6)
HGB BLD-MCNC: 13.4 G/DL (ref 12–15.9)
HGB UR QL STRIP.AUTO: ABNORMAL
HYALINE CASTS UR QL AUTO: ABNORMAL /LPF
IMM GRANULOCYTES # BLD AUTO: 0.02 10*3/MM3 (ref 0–0.05)
IMM GRANULOCYTES NFR BLD AUTO: 0.3 % (ref 0–0.5)
KETONES UR QL STRIP: ABNORMAL
LEUKOCYTE ESTERASE UR QL STRIP.AUTO: ABNORMAL
LYMPHOCYTES # BLD AUTO: 0.95 10*3/MM3 (ref 0.7–3.1)
LYMPHOCYTES NFR BLD AUTO: 16.1 % (ref 19.6–45.3)
MCH RBC QN AUTO: 30.9 PG (ref 26.6–33)
MCHC RBC AUTO-ENTMCNC: 33.6 G/DL (ref 31.5–35.7)
MCV RBC AUTO: 92.1 FL (ref 79–97)
MONOCYTES # BLD AUTO: 0.66 10*3/MM3 (ref 0.1–0.9)
MONOCYTES NFR BLD AUTO: 11.2 % (ref 5–12)
NEUTROPHILS # BLD AUTO: 4.09 10*3/MM3 (ref 1.7–7)
NEUTROPHILS NFR BLD AUTO: 69.2 % (ref 42.7–76)
NITRITE UR QL STRIP: POSITIVE
NRBC BLD AUTO-RTO: 0 /100 WBC (ref 0–0.2)
PH UR STRIP.AUTO: 5.5 [PH] (ref 5–8)
PLATELET # BLD AUTO: 180 10*3/MM3 (ref 140–450)
PMV BLD AUTO: 10.5 FL (ref 6–12)
POTASSIUM BLD-SCNC: 4.5 MMOL/L (ref 3.5–5.2)
PROT SERPL-MCNC: 6.9 G/DL (ref 6–8.5)
PROT UR QL STRIP: NEGATIVE
RBC # BLD AUTO: 4.33 10*6/MM3 (ref 3.77–5.28)
RBC # UR: ABNORMAL /HPF
REF LAB TEST METHOD: ABNORMAL
SODIUM BLD-SCNC: 131 MMOL/L (ref 136–145)
SP GR UR STRIP: 1.01 (ref 1–1.03)
SQUAMOUS #/AREA URNS HPF: ABNORMAL /HPF
UROBILINOGEN UR QL STRIP: ABNORMAL
WBC NRBC COR # BLD: 5.91 10*3/MM3 (ref 3.4–10.8)
WBC UR QL AUTO: ABNORMAL /HPF

## 2020-04-02 PROCEDURE — 94799 UNLISTED PULMONARY SVC/PX: CPT

## 2020-04-02 PROCEDURE — 87077 CULTURE AEROBIC IDENTIFY: CPT | Performed by: EMERGENCY MEDICINE

## 2020-04-02 PROCEDURE — 80053 COMPREHEN METABOLIC PANEL: CPT | Performed by: EMERGENCY MEDICINE

## 2020-04-02 PROCEDURE — 85025 COMPLETE CBC W/AUTO DIFF WBC: CPT | Performed by: EMERGENCY MEDICINE

## 2020-04-02 PROCEDURE — P9612 CATHETERIZE FOR URINE SPEC: HCPCS

## 2020-04-02 PROCEDURE — 81001 URINALYSIS AUTO W/SCOPE: CPT | Performed by: EMERGENCY MEDICINE

## 2020-04-02 PROCEDURE — 87186 SC STD MICRODIL/AGAR DIL: CPT | Performed by: EMERGENCY MEDICINE

## 2020-04-02 PROCEDURE — 94640 AIRWAY INHALATION TREATMENT: CPT

## 2020-04-02 PROCEDURE — 99285 EMERGENCY DEPT VISIT HI MDM: CPT

## 2020-04-02 PROCEDURE — 87086 URINE CULTURE/COLONY COUNT: CPT | Performed by: EMERGENCY MEDICINE

## 2020-04-02 RX ORDER — SULFAMETHOXAZOLE AND TRIMETHOPRIM 800; 160 MG/1; MG/1
1 TABLET ORAL 2 TIMES DAILY
Qty: 14 TABLET | Refills: 0 | Status: SHIPPED | OUTPATIENT
Start: 2020-04-02 | End: 2020-08-07

## 2020-04-02 RX ORDER — IPRATROPIUM BROMIDE AND ALBUTEROL SULFATE 2.5; .5 MG/3ML; MG/3ML
3 SOLUTION RESPIRATORY (INHALATION) ONCE
Status: COMPLETED | OUTPATIENT
Start: 2020-04-02 | End: 2020-04-02

## 2020-04-02 RX ADMIN — IPRATROPIUM BROMIDE AND ALBUTEROL SULFATE 3 ML: 2.5; .5 SOLUTION RESPIRATORY (INHALATION) at 23:04

## 2020-04-03 NOTE — ED NOTES
Pt provided discharge and follow up instructions at this time.  Medications reviewed.  Questions answered.  Pt verbalized understanding of all discharge information. Pt vitals stable, no obvious distress noted.     Nuzhat Stallings, RN  04/02/20 7318

## 2020-04-03 NOTE — ED PROVIDER NOTES
" EMERGENCY DEPARTMENT ENCOUNTER    CHIEF COMPLAINT  Chief Complaint: Fall  History given by: Patient  History limited by: Dementia   Room Number: 21/21  PMD: Sapphire Mckeon MD      HPI:  Pt is a 84 y.o. female who presents to the ED from [facility] with c/o of a fall that took place earlier today while she was putting things away. Pt does not recall falling but she \"woke up on the floor\".  Pt also c/o SOA but has a history of COPD.  Pt denies any fever, chills, N/ V/D. Otherwise, pt feels well and has no other medical complaints. Hx limited by dementia.       Duration:  PTA  Onset: Sudden  Intensity/Severity: moderate  Progression: worsened   Associated Symptoms: SOA    PAST MEDICAL HISTORY  Active Ambulatory Problems     Diagnosis Date Noted   • Degeneration of lumbar or lumbosacral intervertebral disc 06/01/2017   • Lumbar neuritis 06/01/2017   • Closed displaced oblique fracture of shaft of right femur (CMS/[facility]) 02/17/2018   • Fall from standing 02/17/2018   • Anxiety 02/17/2018   • Hypertension 02/17/2018   • Tremors of nervous system 02/17/2018   • Underweight BMI 18.1 02/19/2018   • Osteoporosis with pathological fracture 02/19/2018   • Alcohol withdrawal (CMS/[facility]) 02/19/2018   • History of ORIF-RIGHT FEMUR PERIPROSTHETIC FRACTURE REPAIR OPEN REDUCTION INTERNAL FIXATION - Right 08/26/2018     Resolved Ambulatory Problems     Diagnosis Date Noted   • No Resolved Ambulatory Problems     Past Medical History:   Diagnosis Date   • COPD (chronic obstructive pulmonary disease) (CMS/[facility])    • Emphysema lung (CMS/[facility])    • Low back pain    • Nausea    • Rectal prolapse        PAST SURGICAL HISTORY  Past Surgical History:   Procedure Laterality Date   • BACK SURGERY      4 times lspine    • CATARACT EXTRACTION Bilateral    • COLONOSCOPY  12/28/2011   • FEMUR PERIPROSTHETIC FRACTURE REPAIR Right 2/18/2018    Procedure: RIGHT FEMUR PERIPROSTHETIC FRACTURE REPAIR OPEN REDUCTION INTERNAL FIXATION ;  Surgeon: Nima" MD Miracle;  Location[facility];  Service:    • JOINT REPLACEMENT      right hip        FAMILY HISTORY  Family History   Problem Relation Age of Onset   • COPD Father        SOCIAL HISTORY  Social History     Socioeconomic History   • Marital status:      Spouse name: Not on file   • Number of children: Not on file   • Years of education: Not on file   • Highest education level: Not on file   Tobacco Use   • Smoking status: Former Smoker     Types: Cigarettes   • Smokeless tobacco: Never Used   Substance and Sexual Activity   • Alcohol use: Yes   • Drug use: No   • Sexual activity: Defer       ALLERGIES  Patient has no known allergies.    REVIEW OF SYSTEMS  Review of Systems   Unable to perform ROS: Dementia   Respiratory: Positive for shortness of breath.        PHYSICAL EXAM  ED Triage Vitals [04/02/20 2143]   Temp Heart Rate Resp BP SpO2   98.2 °F (36.8 °C) 72 11 172/97 99 %      Temp src Heart Rate Source Patient Position BP Location FiO2 (%)   Tympanic -- -- -- --     Physical Exam   Constitutional: She is well-developed, well-nourished, and in no distress. No distress.   Pleasantly confused    HENT:   Head: Normocephalic and atraumatic.   Right Ear: External ear normal.   Eyes: Pupils are equal, round, and reactive to light. EOM are normal.   Neck: Normal range of motion. Neck supple.   Cardiovascular: Normal rate, regular rhythm and normal heart sounds.   Pulmonary/Chest: Effort normal. No respiratory distress. She has wheezes (diffuse bilateral ). She has no rales.   Abdominal: Soft. There is no tenderness.   Musculoskeletal: Normal range of motion. She exhibits no edema.   Neurological: She is alert.   Skin: Skin is warm and dry.         I wore full protective equipment throughout this patient encounter including a face mask and gloves. Hand hygiene was performed before donning protective equipment and after removal when leaving the room.    LAB RESULTS  Lab Results (last 24 hours)      Procedure Component Value Units Date/Time    CBC & Differential [[id number]] Collected:  04/02/20 2158    Specimen:  Blood Updated:  04/02/20 2214    Narrative:       The following orders were created for panel order CBC & Differential.  Procedure                               Abnormality         Status                     ---------                               -----------         ------                     CBC Auto Differential[[id number]]        Abnormal            Final result                 Please view results for these tests on the individual orders.    Comprehensive Metabolic Panel [[id number]]  (Abnormal) Collected:  04/02/20 2158    Specimen:  Blood Updated:  04/02/20 2232     Glucose 113 mg/dL      BUN 16 mg/dL      Creatinine 0.99 mg/dL      Sodium 131 mmol/L      Potassium 4.5 mmol/L      Chloride 94 mmol/L      CO2 25.1 mmol/L      Calcium 8.8 mg/dL      Total Protein 6.9 g/dL      Albumin 4.10 g/dL      ALT (SGPT) 39 U/L      AST (SGOT) 31 U/L      Alkaline Phosphatase 90 U/L      Total Bilirubin 0.5 mg/dL      eGFR Non African Amer 54 mL/min/1.73      Globulin 2.8 gm/dL      A/G Ratio 1.5 g/dL      BUN/Creatinine Ratio 16.2     Anion Gap 11.9 mmol/L     Narrative:       GFR Normal >60  Chronic Kidney Disease <60  Kidney Failure <15      CBC Auto Differential [[id number]]  (Abnormal) Collected:  04/02/20 2158    Specimen:  Blood Updated:  04/02/20 2214     WBC 5.91 10*3/mm3      RBC 4.33 10*6/mm3      Hemoglobin 13.4 g/dL      Hematocrit 39.9 %      MCV 92.1 fL      MCH 30.9 pg      MCHC 33.6 g/dL      RDW 13.1 %      RDW-SD 44.1 fl      MPV 10.5 fL      Platelets 180 10*3/mm3      Neutrophil % 69.2 %      Lymphocyte % 16.1 %      Monocyte % 11.2 %      Eosinophil % 2.5 %      Basophil % 0.7 %      Immature Grans % 0.3 %      Neutrophils, Absolute 4.09 10*3/mm3      Lymphocytes, Absolute 0.95 10*3/mm3      Monocytes, Absolute 0.66 10*3/mm3      Eosinophils, Absolute 0.15 10*3/mm3      Basophils, Absolute  0.04 10*3/mm3      Immature Grans, Absolute 0.02 10*3/mm3      nRBC 0.0 /100 WBC     Urinalysis With Microscopic If Indicated (No Culture) - Urine, Catheter [[id number]]  (Abnormal) Collected:  04/02/20 2203    Specimen:  Urine, Catheter Updated:  04/02/20 2228     Color, UA Yellow     Appearance, UA Cloudy     pH, UA 5.5     Specific Gravity, UA 1.014     Glucose, UA Negative     Ketones, UA Trace     Bilirubin, UA Negative     Blood, UA Trace     Protein, UA Negative     Leuk Esterase, UA Moderate (2+)     Nitrite, UA Positive     Urobilinogen, UA 1.0 E.U./dL    Urinalysis, Microscopic Only - Urine, Catheter [[id number]]  (Abnormal) Collected:  04/02/20 2203    Specimen:  Urine, Catheter Updated:  04/02/20 2233     RBC, UA 0-2 /HPF      WBC, UA 6-12 /HPF      Bacteria, UA 3+ /HPF      Squamous Epithelial Cells, UA 0-2 /HPF      Hyaline Casts, UA None Seen /LPF      Methodology Manual Light Microscopy    Urine Culture - Urine, Urine, Catheter [[id number]] Collected:  04/02/20 2203    Specimen:  Urine, Catheter Updated:  04/02/20 2256          I ordered the above labs and reviewed the results      PROCEDURES  Procedures      PROGRESS AND CONSULTS  ED Course as of Apr 02 2330   Thu Apr 02, 2020 2237 UA results noted-I will start her on Bactrim pending culture.    [WC]      ED Course User Index  [WC] Sarbjit Leung MD       10:38 PM  Labs ordered for further evaluation.      10:47 PM  Labs show UTI, plan to start her on Bactrim and discharge. Pt understands and agrees with plan. All concerns were addressed.      MEDICAL DECISION MAKING  Results were reviewed/discussed with the patient and they were also made aware of online access. Pt also made aware that some labs, such as cultures, will not be resulted during ER visit and follow up with PMD is necessary.     MDM  Number of Diagnoses or Management Options  Acute urinary tract infection:   Fall at nursing home, initial encounter:   Generalized weakness:      Amount  and/or Complexity of Data Reviewed  Clinical lab tests: reviewed and ordered (Nitrite positive UTI with 3+ bacteria   )  Review and summarize past medical records: yes  Independent visualization of images, tracings, or specimens: yes    Patient Progress  Patient progress: stable         DIAGNOSIS  Final diagnoses:   Fall at nursing home, initial encounter   Generalized weakness   Acute urinary tract infection       DISPOSITION  DISCHARGE    Patient discharged in stable condition.      FOLLOW-UP  Sapphire Mckeon MD  [address]  [address]  [place] [address] [phone]      As needed    [facility]  [address] [place] [address] [phone]    As needed              Latest Documented Vital Signs:  As of 23:30  BP- 167/85 HR- 80 Temp- 98.2 °F (36.8 °C) (Tympanic) O2 sat- 93%    --  Documentation assistance provided by vicenta Stanford for Dr. Madhu MD.  Information recorded by the scribe was done at my direction and has been verified and validated by me.     Tonio Stanford  04/02/20 2249       Tonio Stanford  04/02/20 2258       Tonio Stanford  04/02/20 2303       Tonio Stnaford  04/02/20 2329       Sarbjit Leung MD  04/02/20 2330

## 2020-04-03 NOTE — ED TRIAGE NOTES
Pt to ER via [facility]. Incident # [id number]. Pt from [facility]. Per EMS pt has fallen x2 today, unknown if hit head, amnesic to event but recalls being dizzy, denies blood thinners. Pt ao except to time. Per EMS pt receives scheduled breathing treatments and has yet to receive one scheduled for this evening-- audible wheezing heard in triage. No cough noted.

## 2020-04-04 LAB — BACTERIA SPEC AEROBE CULT: ABNORMAL

## 2020-08-07 ENCOUNTER — APPOINTMENT (OUTPATIENT)
Dept: GENERAL RADIOLOGY | Facility: HOSPITAL | Age: 85
End: 2020-08-07

## 2020-08-07 ENCOUNTER — HOSPITAL ENCOUNTER (EMERGENCY)
Facility: HOSPITAL | Age: 85
Discharge: HOME | End: 2020-08-07
Attending: EMERGENCY MEDICINE | Admitting: EMERGENCY MEDICINE

## 2020-08-07 VITALS
WEIGHT: 121 LBS | HEIGHT: 70 IN | HEART RATE: 74 BPM | DIASTOLIC BLOOD PRESSURE: 92 MMHG | OXYGEN SATURATION: 97 % | RESPIRATION RATE: 16 BRPM | TEMPERATURE: 97.7 F | SYSTOLIC BLOOD PRESSURE: 128 MMHG | BODY MASS INDEX: 17.32 KG/M2

## 2020-08-07 DIAGNOSIS — R07.9 CHEST PAIN, UNSPECIFIED TYPE: Primary | ICD-10-CM

## 2020-08-07 LAB
ALBUMIN SERPL-MCNC: 4.2 G/DL (ref 3.5–5.2)
ALBUMIN/GLOB SERPL: 1.8 G/DL
ALP SERPL-CCNC: 81 U/L (ref 39–117)
ALT SERPL W P-5'-P-CCNC: 36 U/L (ref 1–33)
ANION GAP SERPL CALCULATED.3IONS-SCNC: 6.9 MMOL/L (ref 5–15)
AST SERPL-CCNC: 28 U/L (ref 1–32)
BASOPHILS # BLD AUTO: 0.06 10*3/MM3 (ref 0–0.2)
BASOPHILS NFR BLD AUTO: 1.2 % (ref 0–1.5)
BILIRUB SERPL-MCNC: 0.2 MG/DL (ref 0–1.2)
BUN SERPL-MCNC: 14 MG/DL (ref 8–23)
BUN/CREAT SERPL: 18.4 (ref 7–25)
CALCIUM SPEC-SCNC: 9.1 MG/DL (ref 8.6–10.5)
CHLORIDE SERPL-SCNC: 98 MMOL/L (ref 98–107)
CO2 SERPL-SCNC: 28.1 MMOL/L (ref 22–29)
CREAT SERPL-MCNC: 0.76 MG/DL (ref 0.57–1)
DEPRECATED RDW RBC AUTO: 45.3 FL (ref 37–54)
EOSINOPHIL # BLD AUTO: 0.13 10*3/MM3 (ref 0–0.4)
EOSINOPHIL NFR BLD AUTO: 2.7 % (ref 0.3–6.2)
ERYTHROCYTE [DISTWIDTH] IN BLOOD BY AUTOMATED COUNT: 13.3 % (ref 12.3–15.4)
GFR SERPL CREATININE-BSD FRML MDRD: 73 ML/MIN/1.73
GLOBULIN UR ELPH-MCNC: 2.4 GM/DL
GLUCOSE SERPL-MCNC: 103 MG/DL (ref 65–99)
HCT VFR BLD AUTO: 40.4 % (ref 34–46.6)
HGB BLD-MCNC: 13.7 G/DL (ref 12–15.9)
HOLD SPECIMEN: NORMAL
HOLD SPECIMEN: NORMAL
IMM GRANULOCYTES # BLD AUTO: 0.02 10*3/MM3 (ref 0–0.05)
IMM GRANULOCYTES NFR BLD AUTO: 0.4 % (ref 0–0.5)
LYMPHOCYTES # BLD AUTO: 1.36 10*3/MM3 (ref 0.7–3.1)
LYMPHOCYTES NFR BLD AUTO: 27.9 % (ref 19.6–45.3)
MCH RBC QN AUTO: 30.9 PG (ref 26.6–33)
MCHC RBC AUTO-ENTMCNC: 33.9 G/DL (ref 31.5–35.7)
MCV RBC AUTO: 91.2 FL (ref 79–97)
MONOCYTES # BLD AUTO: 0.47 10*3/MM3 (ref 0.1–0.9)
MONOCYTES NFR BLD AUTO: 9.7 % (ref 5–12)
NEUTROPHILS NFR BLD AUTO: 2.83 10*3/MM3 (ref 1.7–7)
NEUTROPHILS NFR BLD AUTO: 58.1 % (ref 42.7–76)
NRBC BLD AUTO-RTO: 0 /100 WBC (ref 0–0.2)
NT-PROBNP SERPL-MCNC: 151.7 PG/ML (ref 0–1800)
PLATELET # BLD AUTO: 193 10*3/MM3 (ref 140–450)
PMV BLD AUTO: 10.4 FL (ref 6–12)
POTASSIUM SERPL-SCNC: 4.1 MMOL/L (ref 3.5–5.2)
PROT SERPL-MCNC: 6.6 G/DL (ref 6–8.5)
RBC # BLD AUTO: 4.43 10*6/MM3 (ref 3.77–5.28)
SODIUM SERPL-SCNC: 133 MMOL/L (ref 136–145)
TROPONIN T SERPL-MCNC: <0.01 NG/ML (ref 0–0.03)
WBC # BLD AUTO: 4.87 10*3/MM3 (ref 3.4–10.8)
WHOLE BLOOD HOLD SPECIMEN: NORMAL
WHOLE BLOOD HOLD SPECIMEN: NORMAL

## 2020-08-07 PROCEDURE — 85025 COMPLETE CBC W/AUTO DIFF WBC: CPT

## 2020-08-07 PROCEDURE — 93005 ELECTROCARDIOGRAM TRACING: CPT

## 2020-08-07 PROCEDURE — 93010 ELECTROCARDIOGRAM REPORT: CPT | Performed by: INTERNAL MEDICINE

## 2020-08-07 PROCEDURE — 84484 ASSAY OF TROPONIN QUANT: CPT

## 2020-08-07 PROCEDURE — 83880 ASSAY OF NATRIURETIC PEPTIDE: CPT

## 2020-08-07 PROCEDURE — 71045 X-RAY EXAM CHEST 1 VIEW: CPT

## 2020-08-07 PROCEDURE — 80053 COMPREHEN METABOLIC PANEL: CPT

## 2020-08-07 PROCEDURE — 99284 EMERGENCY DEPT VISIT MOD MDM: CPT

## 2020-08-07 RX ORDER — SODIUM CHLORIDE 0.9 % (FLUSH) 0.9 %
10 SYRINGE (ML) INJECTION AS NEEDED
Status: DISCONTINUED | OUTPATIENT
Start: 2020-08-07 | End: 2020-08-08 | Stop reason: HOSPADM

## 2020-08-07 NOTE — ED NOTES
Patient masked in triage and taken to room.  Sudden Onset of Bilateral hand tingling that has since resolved.  Patient states she is nervous and has anxiety.       Mauricio Salcedo RN  08/07/20 1390

## 2020-08-08 NOTE — ED PROVIDER NOTES
I have supervised the care provided by the midlevel provider.    We have discussed this patient's history, physical exam, and treatment plan.   I have reviewed the note and have personally examined the patient and agree with the plan of care.  See attached attending note.  My personal findings are below:    Patient presented to the ED after having an episode of chest pain.  Patient has dementia and does not remember what happened.  Patient currently has no complaints.  Patient's daughter is at bedside and states the patient is at her baseline.  Daughter states that she was contacted by the patient's aide this evening because the patient was complaining of chest pain and was very anxious.  She reports that the patient does have a history of anxiety.    On exam: Awake and alert.  Heart is regular rate and rhythm.  Lungs are clear bilaterally.  Abdomen soft nontender.  There is no calf tenderness or pedal edema.    Labs and chest x-ray are unremarkable.  I personally reviewed the patient's EKG and agree with DANYELL Alvarenga's interpretation.  Patient will be discharged.     Sarbjit Bruce MD  08/07/20 9192

## 2020-08-08 NOTE — ED NOTES
Pt masked upon arrival. This nurse wearing mask and goggles during entire encounter.     Su [facility] RN  08/07/20 1198

## 2020-08-08 NOTE — ED NOTES
Patient provided discharge instructions at this time.  Respirations are even and unlabored, chest rise and fall is equal in expansion.  All patients questions answered prior to departure from the ED.  Pt ambulated from ED with steady gait, capillary refill within normal limit, speech normal.      Pt masked upon arrival. This nurse wearing mask and goggles during entire encounter.     Su [facility] RN  08/07/20 5038

## 2020-08-18 NOTE — ED PROVIDER NOTES
EMERGENCY DEPARTMENT ENCOUNTER    Room Number:  03/03  Date of encounter:  8/18/2020  PCP: Sapphire Mckeon MD  Historian: Patient/daughter      I used full protective equipment while examining this patient.  This includes face mask, gloves and protective eyewear.  I washed my hands before entering the room and immediately upon leaving the room      HPI:  Chief Complaint: Chest pain, bilateral hand paresthesias  A complete HPI/ROS/PMH/PSH/SH/FH are unobtainable due to: Dementia    Context: Alona Cisneros is a 84 y.o. female who presents to the ED c/o sudden onset of chest pain with bilateral hand paresthesias.  Patient has severe dementia and at this time is unable to remember what symptoms she had or that context of these.  At this time she has no complaints.  Her daughter is at bedside and states her dementia is at baseline at this time.  They both deny any significant cardiac history.  At this time patient denies any chest pain, shortness of breath, fever.    Review of Medical Records  I reviewed last ER visit from 4/2/2020.  Patient seen for fall, UTI.    PAST MEDICAL HISTORY  Active Ambulatory Problems     Diagnosis Date Noted   • Degeneration of lumbar or lumbosacral intervertebral disc 06/01/2017   • Lumbar neuritis 06/01/2017   • Closed displaced oblique fracture of shaft of right femur (CMS/[facility]) 02/17/2018   • Fall from standing 02/17/2018   • Anxiety 02/17/2018   • Hypertension 02/17/2018   • Tremors of nervous system 02/17/2018   • Underweight BMI 18.1 02/19/2018   • Osteoporosis with pathological fracture 02/19/2018   • Alcohol withdrawal (CMS/[facility]) 02/19/2018   • History of ORIF-RIGHT FEMUR PERIPROSTHETIC FRACTURE REPAIR OPEN REDUCTION INTERNAL FIXATION - Right 08/26/2018     Resolved Ambulatory Problems     Diagnosis Date Noted   • No Resolved Ambulatory Problems     Past Medical History:   Diagnosis Date   • COPD (chronic obstructive pulmonary disease) (CMS/[facility])    • Emphysema lung (CMS/[facility])    •  Low back pain    • Nausea    • Rectal prolapse          PAST SURGICAL HISTORY  Past Surgical History:   Procedure Laterality Date   • BACK SURGERY      4 times lspine    • CATARACT EXTRACTION Bilateral    • COLONOSCOPY  12/28/2011   • FEMUR PERIPROSTHETIC FRACTURE REPAIR Right 2/18/2018    Procedure: RIGHT FEMUR PERIPROSTHETIC FRACTURE REPAIR OPEN REDUCTION INTERNAL FIXATION ;  Surgeon: Nima Rausch MD;  Location: [facility];  Service:    • JOINT REPLACEMENT      right hip          FAMILY HISTORY  Family History   Problem Relation Age of Onset   • COPD Father          SOCIAL HISTORY  Social History     Socioeconomic History   • Marital status:      Spouse name: Not on file   • Number of children: Not on file   • Years of education: Not on file   • Highest education level: Not on file   Tobacco Use   • Smoking status: Former Smoker     Types: Cigarettes   • Smokeless tobacco: Never Used   Substance and Sexual Activity   • Alcohol use: Yes   • Drug use: No   • Sexual activity: Defer         ALLERGIES  Patient has no known allergies.        REVIEW OF SYSTEMS  Limited secondary to dementia      PHYSICAL EXAM    I have reviewed the triage vital signs and nursing notes.    ED Triage Vitals [08/07/20 1843]   Temp Heart Rate Resp BP SpO2   97.7 °F (36.5 °C) 73 16 139/77 98 %      Temp src Heart Rate Source Patient Position BP Location FiO2 (%)   Tympanic Monitor Sitting Left arm --       Physical Exam  GENERAL: Alert, oriented, pleasantly confused, not distressed  HENT: nares patent, head atraumatic  EYES: no scleral icterus, EOMI  CV: regular rhythm, regular rate, no murmur  RESPIRATORY: normal effort, CTA  ABDOMEN: soft, nontender  MUSCULOSKELETAL: no deformity, FROM, no calf swelling or tenderness  NEURO: alert, moves all extremities, follows commands  SKIN: warm, dry        LAB RESULTS  No results found for this or any previous visit (from the past 24 hour(s)).    Ordered the above labs and independently  reviewed the results.        RADIOLOGY  No Radiology Exams Resulted Within Past 24 Hours    I ordered the above noted radiological studies. Reviewed by me and discussed with radiologist.  See dictation for official radiology interpretation.    PROGRESS, DATA ANALYSIS, CONSULTS, AND MEDICAL DECISION MAKING    All labs have been independently reviewed by me.  All radiology studies have been reviewed by me and discussed with radiologist dictating the report.   EKG's independently viewed and interpreted by me.  Discussion below represents my analysis of pertinent findings related to patient's condition, differential diagnosis, treatment plan and final disposition.    I have discussed case with Dr. Bruce, emergency room physician.  He has performed his own bedside examination and agrees with treatment plan.    ED Course as of Aug 18 1013   Fri Aug 07, 2020   2229 Presents with an episode of chest pain at approximately 6:00.  Patient has a history of severe dementia and is unable to give further details.  Plan to check basic labs, chest x-ray, EKG.  Differential diagnoses include but not limited to ACS, pneumonia, panic attack.    [EE]   2230 Chest x-ray interpreted by myself shows chronic scarring without evidence of acute infiltrate or effusion.    [EE]   2230 EKG interpreted by myself.  Time 2148.  Sinus rhythm, 60 bpm.  Normal P/EDGAR.  QRS normal, normal axis.  Nonspecific T wave changes.  Similar to 9/25/2019.    [EE]   2248 Troponin T: <0.010 [EE]   2248 proBNP: 151.7 [EE]   2248 WBC: 4.87 [EE]   2248 Glucose(!): 103 [EE]   2248 Creatinine: 0.76 [EE]   2305 I reviewed negative work-up with patient and daughter.  At this time patient is asymptomatic.  Again the exact history of the event is somewhat unclear.  Plan for discharge with cardiology follow-up as needed.    [EE]   2307 HEART Score 3 (2 age,1 RFs)    [EE]      ED Course User Index  [EE] Lev Alvarenga PA       AS OF 10:13 VITALS:    BP - 128/92  HR -  74  TEMP - 97.7 °F (36.5 °C) (Tympanic)  O2 SATS - 97%        DIAGNOSIS  Final diagnoses:   Chest pain, unspecified type         DISPOSITION  Discharged           Lev Alvarenga PA  08/27/20 8322

## 2021-01-01 ENCOUNTER — APPOINTMENT (OUTPATIENT)
Dept: GENERAL RADIOLOGY | Facility: HOSPITAL | Age: 86
End: 2021-01-01

## 2021-01-01 ENCOUNTER — HOSPITAL ENCOUNTER (INPATIENT)
Facility: HOSPITAL | Age: 86
LOS: 3 days | Discharge: NURSING, REHAB OR LONG-TERM CARE | End: 2021-01-05
Attending: EMERGENCY MEDICINE | Admitting: HOSPITALIST

## 2021-01-01 ENCOUNTER — APPOINTMENT (OUTPATIENT)
Dept: CT IMAGING | Facility: HOSPITAL | Age: 86
End: 2021-01-01

## 2021-01-01 DIAGNOSIS — N39.0 ACUTE UTI: Primary | ICD-10-CM

## 2021-01-01 DIAGNOSIS — R29.6 FREQUENT FALLS: ICD-10-CM

## 2021-01-01 LAB
ALBUMIN SERPL-MCNC: 3.9 G/DL (ref 3.5–5.2)
ALBUMIN/GLOB SERPL: 1.4 G/DL
ALP SERPL-CCNC: 99 U/L (ref 39–117)
ALT SERPL W P-5'-P-CCNC: 36 U/L (ref 1–33)
ANION GAP SERPL CALCULATED.3IONS-SCNC: 10 MMOL/L (ref 5–15)
AST SERPL-CCNC: 34 U/L (ref 1–32)
BASOPHILS # BLD AUTO: 0.02 10*3/MM3 (ref 0–0.2)
BASOPHILS NFR BLD AUTO: 0.9 % (ref 0–1.5)
BILIRUB SERPL-MCNC: 0.2 MG/DL (ref 0–1.2)
BUN SERPL-MCNC: 22 MG/DL (ref 8–23)
BUN/CREAT SERPL: 26.5 (ref 7–25)
CALCIUM SPEC-SCNC: 8.7 MG/DL (ref 8.6–10.5)
CHLORIDE SERPL-SCNC: 99 MMOL/L (ref 98–107)
CO2 SERPL-SCNC: 27 MMOL/L (ref 22–29)
CREAT SERPL-MCNC: 0.83 MG/DL (ref 0.57–1)
D-LACTATE SERPL-SCNC: 1.7 MMOL/L (ref 0.5–2)
DEPRECATED RDW RBC AUTO: 44.4 FL (ref 37–54)
EOSINOPHIL # BLD AUTO: 0.02 10*3/MM3 (ref 0–0.4)
EOSINOPHIL NFR BLD AUTO: 0.9 % (ref 0.3–6.2)
ERYTHROCYTE [DISTWIDTH] IN BLOOD BY AUTOMATED COUNT: 13.1 % (ref 12.3–15.4)
GFR SERPL CREATININE-BSD FRML MDRD: 66 ML/MIN/1.73
GLOBULIN UR ELPH-MCNC: 2.8 GM/DL
GLUCOSE SERPL-MCNC: 112 MG/DL (ref 65–99)
HCT VFR BLD AUTO: 40.8 % (ref 34–46.6)
HGB BLD-MCNC: 13.6 G/DL (ref 12–15.9)
IMM GRANULOCYTES # BLD AUTO: 0.04 10*3/MM3 (ref 0–0.05)
IMM GRANULOCYTES NFR BLD AUTO: 1.8 % (ref 0–0.5)
LYMPHOCYTES # BLD AUTO: 0.62 10*3/MM3 (ref 0.7–3.1)
LYMPHOCYTES NFR BLD AUTO: 28.3 % (ref 19.6–45.3)
MCH RBC QN AUTO: 30.6 PG (ref 26.6–33)
MCHC RBC AUTO-ENTMCNC: 33.3 G/DL (ref 31.5–35.7)
MCV RBC AUTO: 91.7 FL (ref 79–97)
MONOCYTES # BLD AUTO: 0.42 10*3/MM3 (ref 0.1–0.9)
MONOCYTES NFR BLD AUTO: 19.2 % (ref 5–12)
NEUTROPHILS NFR BLD AUTO: 1.07 10*3/MM3 (ref 1.7–7)
NEUTROPHILS NFR BLD AUTO: 48.9 % (ref 42.7–76)
NRBC BLD AUTO-RTO: 0 /100 WBC (ref 0–0.2)
NT-PROBNP SERPL-MCNC: 214.5 PG/ML (ref 0–1800)
PLATELET # BLD AUTO: 188 10*3/MM3 (ref 140–450)
PMV BLD AUTO: 10.3 FL (ref 6–12)
POTASSIUM SERPL-SCNC: 4.3 MMOL/L (ref 3.5–5.2)
PROCALCITONIN SERPL-MCNC: 0.11 NG/ML (ref 0–0.25)
PROT SERPL-MCNC: 6.7 G/DL (ref 6–8.5)
RBC # BLD AUTO: 4.45 10*6/MM3 (ref 3.77–5.28)
SODIUM SERPL-SCNC: 136 MMOL/L (ref 136–145)
TROPONIN T SERPL-MCNC: <0.01 NG/ML (ref 0–0.03)
WBC # BLD AUTO: 2.19 10*3/MM3 (ref 3.4–10.8)

## 2021-01-01 PROCEDURE — 71045 X-RAY EXAM CHEST 1 VIEW: CPT

## 2021-01-01 PROCEDURE — 83605 ASSAY OF LACTIC ACID: CPT | Performed by: EMERGENCY MEDICINE

## 2021-01-01 PROCEDURE — 83880 ASSAY OF NATRIURETIC PEPTIDE: CPT | Performed by: EMERGENCY MEDICINE

## 2021-01-01 PROCEDURE — 99285 EMERGENCY DEPT VISIT HI MDM: CPT

## 2021-01-01 PROCEDURE — 93010 ELECTROCARDIOGRAM REPORT: CPT | Performed by: INTERNAL MEDICINE

## 2021-01-01 PROCEDURE — 70450 CT HEAD/BRAIN W/O DYE: CPT

## 2021-01-01 PROCEDURE — 93005 ELECTROCARDIOGRAM TRACING: CPT | Performed by: EMERGENCY MEDICINE

## 2021-01-01 PROCEDURE — 84484 ASSAY OF TROPONIN QUANT: CPT | Performed by: EMERGENCY MEDICINE

## 2021-01-01 PROCEDURE — 84145 PROCALCITONIN (PCT): CPT | Performed by: EMERGENCY MEDICINE

## 2021-01-01 PROCEDURE — 85025 COMPLETE CBC W/AUTO DIFF WBC: CPT | Performed by: EMERGENCY MEDICINE

## 2021-01-01 PROCEDURE — 80053 COMPREHEN METABOLIC PANEL: CPT | Performed by: EMERGENCY MEDICINE

## 2021-01-01 RX ORDER — SODIUM CHLORIDE 0.9 % (FLUSH) 0.9 %
10 SYRINGE (ML) INJECTION AS NEEDED
Status: DISCONTINUED | OUTPATIENT
Start: 2021-01-01 | End: 2021-01-05 | Stop reason: HOSPADM

## 2021-01-02 ENCOUNTER — APPOINTMENT (OUTPATIENT)
Dept: GENERAL RADIOLOGY | Facility: HOSPITAL | Age: 86
End: 2021-01-02

## 2021-01-02 PROBLEM — N39.0 ACUTE UTI: Status: ACTIVE | Noted: 2021-01-02

## 2021-01-02 PROBLEM — F03.90 DEMENTIA (HCC): Status: ACTIVE | Noted: 2021-01-02

## 2021-01-02 PROBLEM — J44.9 COPD (CHRONIC OBSTRUCTIVE PULMONARY DISEASE) (HCC): Status: ACTIVE | Noted: 2021-01-02

## 2021-01-02 PROBLEM — U07.1 COVID-19: Status: ACTIVE | Noted: 2021-01-02

## 2021-01-02 LAB
ANION GAP SERPL CALCULATED.3IONS-SCNC: 12.5 MMOL/L (ref 5–15)
BACTERIA UR QL AUTO: ABNORMAL /HPF
BILIRUB UR QL STRIP: NEGATIVE
BUN SERPL-MCNC: 16 MG/DL (ref 8–23)
BUN/CREAT SERPL: 22.5 (ref 7–25)
CALCIUM SPEC-SCNC: 8.5 MG/DL (ref 8.6–10.5)
CHLORIDE SERPL-SCNC: 99 MMOL/L (ref 98–107)
CLARITY UR: CLEAR
CO2 SERPL-SCNC: 23.5 MMOL/L (ref 22–29)
COLOR UR: YELLOW
CREAT SERPL-MCNC: 0.71 MG/DL (ref 0.57–1)
DEPRECATED RDW RBC AUTO: 42.8 FL (ref 37–54)
ERYTHROCYTE [DISTWIDTH] IN BLOOD BY AUTOMATED COUNT: 13 % (ref 12.3–15.4)
GFR SERPL CREATININE-BSD FRML MDRD: 79 ML/MIN/1.73
GLUCOSE SERPL-MCNC: 127 MG/DL (ref 65–99)
GLUCOSE UR STRIP-MCNC: NEGATIVE MG/DL
HCT VFR BLD AUTO: 41.3 % (ref 34–46.6)
HGB BLD-MCNC: 13.9 G/DL (ref 12–15.9)
HGB UR QL STRIP.AUTO: ABNORMAL
HYALINE CASTS UR QL AUTO: ABNORMAL /LPF
KETONES UR QL STRIP: NEGATIVE
LEUKOCYTE ESTERASE UR QL STRIP.AUTO: ABNORMAL
MCH RBC QN AUTO: 30.5 PG (ref 26.6–33)
MCHC RBC AUTO-ENTMCNC: 33.7 G/DL (ref 31.5–35.7)
MCV RBC AUTO: 90.6 FL (ref 79–97)
NITRITE UR QL STRIP: POSITIVE
PH UR STRIP.AUTO: 6 [PH] (ref 5–8)
PLATELET # BLD AUTO: 188 10*3/MM3 (ref 140–450)
PMV BLD AUTO: 10.2 FL (ref 6–12)
POTASSIUM SERPL-SCNC: 4 MMOL/L (ref 3.5–5.2)
PROT UR QL STRIP: NEGATIVE
QT INTERVAL: 365 MS
RBC # BLD AUTO: 4.56 10*6/MM3 (ref 3.77–5.28)
RBC # UR: ABNORMAL /HPF
REF LAB TEST METHOD: ABNORMAL
SARS-COV-2 RNA RESP QL NAA+PROBE: DETECTED
SODIUM SERPL-SCNC: 135 MMOL/L (ref 136–145)
SP GR UR STRIP: 1.02 (ref 1–1.03)
SQUAMOUS #/AREA URNS HPF: ABNORMAL /HPF
UROBILINOGEN UR QL STRIP: ABNORMAL
WBC # BLD AUTO: 2.24 10*3/MM3 (ref 3.4–10.8)
WBC UR QL AUTO: ABNORMAL /HPF

## 2021-01-02 PROCEDURE — P9612 CATHETERIZE FOR URINE SPEC: HCPCS

## 2021-01-02 PROCEDURE — 72170 X-RAY EXAM OF PELVIS: CPT

## 2021-01-02 PROCEDURE — 85027 COMPLETE CBC AUTOMATED: CPT | Performed by: NURSE PRACTITIONER

## 2021-01-02 PROCEDURE — 94640 AIRWAY INHALATION TREATMENT: CPT

## 2021-01-02 PROCEDURE — 81001 URINALYSIS AUTO W/SCOPE: CPT | Performed by: EMERGENCY MEDICINE

## 2021-01-02 PROCEDURE — 25010000002 CEFTRIAXONE PER 250 MG: Performed by: EMERGENCY MEDICINE

## 2021-01-02 PROCEDURE — 80048 BASIC METABOLIC PNL TOTAL CA: CPT | Performed by: NURSE PRACTITIONER

## 2021-01-02 PROCEDURE — U0003 INFECTIOUS AGENT DETECTION BY NUCLEIC ACID (DNA OR RNA); SEVERE ACUTE RESPIRATORY SYNDROME CORONAVIRUS 2 (SARS-COV-2) (CORONAVIRUS DISEASE [COVID-19]), AMPLIFIED PROBE TECHNIQUE, MAKING USE OF HIGH THROUGHPUT TECHNOLOGIES AS DESCRIBED BY CMS-2020-01-R: HCPCS | Performed by: HOSPITALIST

## 2021-01-02 PROCEDURE — 94799 UNLISTED PULMONARY SVC/PX: CPT

## 2021-01-02 RX ORDER — CITALOPRAM 20 MG/1
20 TABLET ORAL DAILY
Status: DISCONTINUED | OUTPATIENT
Start: 2021-01-02 | End: 2021-01-05 | Stop reason: HOSPADM

## 2021-01-02 RX ORDER — ACETAMINOPHEN 160 MG/5ML
650 SOLUTION ORAL EVERY 4 HOURS PRN
Status: DISCONTINUED | OUTPATIENT
Start: 2021-01-02 | End: 2021-01-05 | Stop reason: HOSPADM

## 2021-01-02 RX ORDER — PRIMIDONE 50 MG/1
150 TABLET ORAL EVERY MORNING
Status: DISCONTINUED | OUTPATIENT
Start: 2021-01-02 | End: 2021-01-02

## 2021-01-02 RX ORDER — BISACODYL 5 MG/1
5 TABLET, DELAYED RELEASE ORAL DAILY PRN
Status: DISCONTINUED | OUTPATIENT
Start: 2021-01-02 | End: 2021-01-05 | Stop reason: HOSPADM

## 2021-01-02 RX ORDER — DONEPEZIL HYDROCHLORIDE 10 MG/1
10 TABLET, FILM COATED ORAL NIGHTLY
Status: DISCONTINUED | OUTPATIENT
Start: 2021-01-02 | End: 2021-01-05 | Stop reason: HOSPADM

## 2021-01-02 RX ORDER — ACETAMINOPHEN 650 MG/1
650 SUPPOSITORY RECTAL EVERY 4 HOURS PRN
Status: DISCONTINUED | OUTPATIENT
Start: 2021-01-02 | End: 2021-01-05 | Stop reason: HOSPADM

## 2021-01-02 RX ORDER — AMLODIPINE BESYLATE 2.5 MG/1
2.5 TABLET ORAL DAILY
Status: DISCONTINUED | OUTPATIENT
Start: 2021-01-02 | End: 2021-01-05 | Stop reason: HOSPADM

## 2021-01-02 RX ORDER — ONDANSETRON 2 MG/ML
4 INJECTION INTRAMUSCULAR; INTRAVENOUS EVERY 6 HOURS PRN
Status: DISCONTINUED | OUTPATIENT
Start: 2021-01-02 | End: 2021-01-05 | Stop reason: HOSPADM

## 2021-01-02 RX ORDER — NITROGLYCERIN 0.4 MG/1
0.4 TABLET SUBLINGUAL
Status: DISCONTINUED | OUTPATIENT
Start: 2021-01-02 | End: 2021-01-05 | Stop reason: HOSPADM

## 2021-01-02 RX ORDER — ONDANSETRON 4 MG/1
4 TABLET, FILM COATED ORAL EVERY 6 HOURS PRN
Status: DISCONTINUED | OUTPATIENT
Start: 2021-01-02 | End: 2021-01-05 | Stop reason: HOSPADM

## 2021-01-02 RX ORDER — ACETAMINOPHEN 325 MG/1
650 TABLET ORAL EVERY 4 HOURS PRN
Status: DISCONTINUED | OUTPATIENT
Start: 2021-01-02 | End: 2021-01-05 | Stop reason: HOSPADM

## 2021-01-02 RX ORDER — CEFTRIAXONE SODIUM 1 G/50ML
1 INJECTION, SOLUTION INTRAVENOUS EVERY 24 HOURS
Status: DISCONTINUED | OUTPATIENT
Start: 2021-01-03 | End: 2021-01-04

## 2021-01-02 RX ORDER — CITALOPRAM 20 MG/1
20 TABLET ORAL DAILY
COMMUNITY

## 2021-01-02 RX ORDER — SODIUM CHLORIDE 0.9 % (FLUSH) 0.9 %
10 SYRINGE (ML) INJECTION EVERY 12 HOURS SCHEDULED
Status: DISCONTINUED | OUTPATIENT
Start: 2021-01-02 | End: 2021-01-05 | Stop reason: HOSPADM

## 2021-01-02 RX ORDER — CEFTRIAXONE SODIUM 1 G/50ML
1 INJECTION, SOLUTION INTRAVENOUS ONCE
Status: COMPLETED | OUTPATIENT
Start: 2021-01-02 | End: 2021-01-02

## 2021-01-02 RX ORDER — SODIUM CHLORIDE 0.9 % (FLUSH) 0.9 %
10 SYRINGE (ML) INJECTION AS NEEDED
Status: DISCONTINUED | OUTPATIENT
Start: 2021-01-02 | End: 2021-01-05 | Stop reason: HOSPADM

## 2021-01-02 RX ORDER — CALCIUM CARBONATE 200(500)MG
2 TABLET,CHEWABLE ORAL 2 TIMES DAILY PRN
Status: DISCONTINUED | OUTPATIENT
Start: 2021-01-02 | End: 2021-01-05 | Stop reason: HOSPADM

## 2021-01-02 RX ORDER — BUDESONIDE AND FORMOTEROL FUMARATE DIHYDRATE 80; 4.5 UG/1; UG/1
2 AEROSOL RESPIRATORY (INHALATION)
Status: DISCONTINUED | OUTPATIENT
Start: 2021-01-02 | End: 2021-01-04

## 2021-01-02 RX ADMIN — SODIUM CHLORIDE, PRESERVATIVE FREE 10 ML: 5 INJECTION INTRAVENOUS at 08:23

## 2021-01-02 RX ADMIN — CITALOPRAM 20 MG: 20 TABLET, FILM COATED ORAL at 08:23

## 2021-01-02 RX ADMIN — AMLODIPINE BESYLATE 2.5 MG: 2.5 TABLET ORAL at 08:23

## 2021-01-02 RX ADMIN — BUDESONIDE AND FORMOTEROL FUMARATE DIHYDRATE 2 PUFF: 80; 4.5 AEROSOL RESPIRATORY (INHALATION) at 10:18

## 2021-01-02 RX ADMIN — CEFTRIAXONE SODIUM 1 G: 1 INJECTION, SOLUTION INTRAVENOUS at 01:42

## 2021-01-02 RX ADMIN — DONEPEZIL HYDROCHLORIDE 10 MG: 10 TABLET, FILM COATED ORAL at 20:35

## 2021-01-02 NOTE — PROGRESS NOTES
DAILY PROGRESS NOTE  [facility]    Patient Identification:  Name: Alona Cisneros  Age: 85 y.o.  Sex: female  :  1935  MRN: [id number]         Primary Care Physician: Sapphire Mckeon MD    Subjective:  Interval History: No no family at bedside.  Patient affect flat t voicing any new complaints.  Specifically denies chest pain shortness of breath or any cough.  Denies nausea vomiting abdominal and/or flank pain.  Denies any hip pain and had no discomfort with flexion of either lower extremity as well as internal and external rotation    Objective: No family at bedside.  Patient affect flat and likely baseline given past history of dementia.  Otherwise conversational and in touch hard of hearing but answers questions appropriately at times.  Certainly does not appear toxic nor does she appear to be horribly infected with Covid    Scheduled Meds:amLODIPine, 2.5 mg, Oral, Daily  budesonide-formoterol, 2 puff, Inhalation, BID - RT  [START ON 1/3/2021] cefTRIAXone, 1 g, Intravenous, Q24H  citalopram, 20 mg, Oral, Daily  donepezil, 10 mg, Oral, Nightly  sodium chloride, 10 mL, Intravenous, Q12H      Continuous Infusions:     Vital signs in last 24 hours:  Temp:  [98.6 °F (37 °C)-99.4 °F (37.4 °C)] 99.4 °F (37.4 °C)  Heart Rate:  [69-97] 97  Resp:  [16-18] 16  BP: (110-169)/(61-98) 145/73    Intake/Output:    Intake/Output Summary (Last 24 hours) at 2021 1215  Last data filed at 2021 0900  Gross per 24 hour   Intake 150 ml   Output --   Net 150 ml       Exam:  /73 (BP Location: Left arm, Patient Position: Lying)   Pulse 97   Temp 99.4 °F (37.4 °C) (Oral)   Resp 16   Wt 50.8 kg (111 lb 14.4 oz)   SpO2 97%   BMI 16.06 kg/m²     General Appearance:    Alert, cooperative                          Head:    Normocephalic, without obvious abnormality, atraumatic                           Eyes:    PERRL, conjunctivae/corneas clear, EOM's intact, both eyes                          Throat:   Oral mucosa pink and moist                           Neck:   Supple, symmetrical, trachea midline, no meningismus or JVD                         Lungs:    Clear to auscultation bilaterally, respirations unlabored                 Chest Wall:    No tenderness or deformity                          Heart:    Regular rate and rhythm, S1 and S2 normal                  Abdomen:     Soft, nontender, bowel sounds active, no CVA tenderness to palpation                 extremities: Moving although globally weak, no cyanosis or edema                        Pulses:   Pulses palpable in all extremities                            Skin:   Skin is warm and dry                  Neurologic:   CNII-XII intact, no focal deficits noted     Data Review:  Labs in chart were reviewed.    Assessment:  Active Hospital Problems    Diagnosis  POA   • **Acute UTI [N39.0]  Yes   • Dementia (CMS/HCC) [F03.90]  Unknown   • COPD (chronic obstructive pulmonary disease) (CMS/HCC) [J44.9]  Unknown   • COVID-19 [U07.1]  Unknown   • Hypertension [I10]  Yes      Resolved Hospital Problems   No resolved problems to display.       Plan:    UTI -Rocephin D2 with culture pending    Covid -incidental finding as patient pretty much asymptomatic.  Current O2 saturations 100%   -Supportive care -no indication for steroids or additional treatment at this time    Fall with no acute disease per CT head -PT/fall precautions    Dementia without behavior disturbance    HTN stable    Leukocytopenia secondary to viral illness    Reached out to family and called [phone] to update but there was no answer.  I did not leave voicemail due to HIPAA but I will try again tomorrow   -CODE STATUS currently listed as full    Taiwo Siegel MD  1/2/2021  12:15 EST

## 2021-01-02 NOTE — ED PROVIDER NOTES
EMERGENCY DEPARTMENT ENCOUNTER    Room Number:  23/23  Date of encounter:  1/2/2021  PCP: Sapphire Mckeon MD  Historian: EMS, patient's daughter      HPI:  Chief Complaint: Mechanical fall at the nursing facility  A complete HPI/ROS/PMH/PSH/SH/FH are unobtainable due to: Dementia    Context: Alona Csineros is a 85 y.o. female who presents to the ED c/o EMS reports a mechanical fall at the nursing home earlier this evening.  Apparently when she fell, she was complaining of pain in her chest and shortness of breath, but also was very anxious.  They do not believe she hit her head, and she had no loss of consciousness, but she is severely demented and difficult to assess.    Patient appears to been in however normal state of health, but perhaps she is been more weak and confused than usual.  Obviously it is difficult to assess in a patient with severe dementia.      PAST MEDICAL HISTORY  Active Ambulatory Problems     Diagnosis Date Noted   • Degeneration of lumbar or lumbosacral intervertebral disc 06/01/2017   • Lumbar neuritis 06/01/2017   • Closed displaced oblique fracture of shaft of right femur (CMS/[facility]) 02/17/2018   • Fall from standing 02/17/2018   • Anxiety 02/17/2018   • Hypertension 02/17/2018   • Tremors of nervous system 02/17/2018   • Underweight BMI 18.1 02/19/2018   • Osteoporosis with pathological fracture 02/19/2018   • Alcohol withdrawal (CMS/[facility]) 02/19/2018   • History of ORIF-RIGHT FEMUR PERIPROSTHETIC FRACTURE REPAIR OPEN REDUCTION INTERNAL FIXATION - Right 08/26/2018     Resolved Ambulatory Problems     Diagnosis Date Noted   • No Resolved Ambulatory Problems     Past Medical History:   Diagnosis Date   • COPD (chronic obstructive pulmonary disease) (CMS/[facility])    • Emphysema lung (CMS/[facility])    • Low back pain    • Nausea    • Rectal prolapse          PAST SURGICAL HISTORY  Past Surgical History:   Procedure Laterality Date   • BACK SURGERY      4 times lspine    • CATARACT EXTRACTION Bilateral     • COLONOSCOPY  12/28/2011   • FEMUR PERIPROSTHETIC FRACTURE REPAIR Right 2/18/2018    Procedure: RIGHT FEMUR PERIPROSTHETIC FRACTURE REPAIR OPEN REDUCTION INTERNAL FIXATION ;  Surgeon: Nima Rausch MD;  Location: [facility]  Service:    • JOINT REPLACEMENT      right hip          FAMILY HISTORY  Family History   Problem Relation Age of Onset   • COPD Father          SOCIAL HISTORY  Social History     Socioeconomic History   • Marital status:      Spouse name: Not on file   • Number of children: Not on file   • Years of education: Not on file   • Highest education level: Not on file   Tobacco Use   • Smoking status: Former Smoker     Types: Cigarettes   • Smokeless tobacco: Never Used   Substance and Sexual Activity   • Alcohol use: Yes   • Drug use: No   • Sexual activity: Defer         ALLERGIES  Patient has no known allergies.        REVIEW OF SYSTEMS  Review of Systems   Limited due to dementia      PHYSICAL EXAM    I have reviewed the triage vital signs and nursing notes.    ED Triage Vitals [01/01/21 2153]   Temp Heart Rate Resp BP SpO2   98.6 °F (37 °C) 96 18 110/64 96 %      Temp src Heart Rate Source Patient Position BP Location FiO2 (%)   Oral Monitor Sitting -- --       Physical Exam  GENERAL: Awake and alert, dementia is evident, but no distress  HENT: nares patent, NCAT, neck nontender to palpation  EYES: no scleral icterus, [place], EOMI  CV: regular rhythm, regular rate  RESPIRATORY: normal effort  ABDOMEN: soft, nontender palpation, bowel sounds present  MUSCULOSKELETAL: no deformity, pelvis stable, no obvious signs of long bone trauma  NEURO: alert, moves all extremities, follows very simple commands, but she is quite confused.  Does not appear to have any obvious focal deficits other than mental status  SKIN: warm, dry        LAB RESULTS  Recent Results (from the past 24 hour(s))   Comprehensive Metabolic Panel    Collection Time: 01/01/21 10:06 PM    Specimen: Blood   Result Value Ref  Range    Glucose 112 (H) 65 - 99 mg/dL    BUN 22 8 - 23 mg/dL    Creatinine 0.83 0.57 - 1.00 mg/dL    Sodium 136 136 - 145 mmol/L    Potassium 4.3 3.5 - 5.2 mmol/L    Chloride 99 98 - 107 mmol/L    CO2 27.0 22.0 - 29.0 mmol/L    Calcium 8.7 8.6 - 10.5 mg/dL    Total Protein 6.7 6.0 - 8.5 g/dL    Albumin 3.90 3.50 - 5.20 g/dL    ALT (SGPT) 36 (H) 1 - 33 U/L    AST (SGOT) 34 (H) 1 - 32 U/L    Alkaline Phosphatase 99 39 - 117 U/L    Total Bilirubin 0.2 0.0 - 1.2 mg/dL    eGFR Non African Amer 66 >60 mL/min/1.73    Globulin 2.8 gm/dL    A/G Ratio 1.4 g/dL    BUN/Creatinine Ratio 26.5 (H) 7.0 - 25.0    Anion Gap 10.0 5.0 - 15.0 mmol/L   BNP    Collection Time: 01/01/21 10:06 PM    Specimen: Blood   Result Value Ref Range    proBNP 214.5 0.0-1,800.0 pg/mL   Troponin    Collection Time: 01/01/21 10:06 PM    Specimen: Blood   Result Value Ref Range    Troponin T <0.010 0.000 - 0.030 ng/mL   Procalcitonin    Collection Time: 01/01/21 10:06 PM    Specimen: Blood   Result Value Ref Range    Procalcitonin 0.11 0.00 - 0.25 ng/mL   Lactic Acid, Plasma    Collection Time: 01/01/21 10:06 PM    Specimen: Blood   Result Value Ref Range    Lactate 1.7 0.5 - 2.0 mmol/L   CBC Auto Differential    Collection Time: 01/01/21 10:06 PM    Specimen: Blood   Result Value Ref Range    WBC 2.19 (L) 3.40 - 10.80 10*3/mm3    RBC 4.45 3.77 - 5.28 10*6/mm3    Hemoglobin 13.6 12.0 - 15.9 g/dL    Hematocrit 40.8 34.0 - 46.6 %    MCV 91.7 79.0 - 97.0 fL    MCH 30.6 26.6 - 33.0 pg    MCHC 33.3 31.5 - 35.7 g/dL    RDW 13.1 12.3 - 15.4 %    RDW-SD 44.4 37.0 - 54.0 fl    MPV 10.3 6.0 - 12.0 fL    Platelets 188 140 - 450 10*3/mm3    Neutrophil % 48.9 42.7 - 76.0 %    Lymphocyte % 28.3 19.6 - 45.3 %    Monocyte % 19.2 (H) 5.0 - 12.0 %    Eosinophil % 0.9 0.3 - 6.2 %    Basophil % 0.9 0.0 - 1.5 %    Immature Grans % 1.8 (H) 0.0 - 0.5 %    Neutrophils, Absolute 1.07 (L) 1.70 - 7.00 10*3/mm3    Lymphocytes, Absolute 0.62 (L) 0.70 - 3.10 10*3/mm3    Monocytes,  Absolute 0.42 0.10 - 0.90 10*3/mm3    Eosinophils, Absolute 0.02 0.00 - 0.40 10*3/mm3    Basophils, Absolute 0.02 0.00 - 0.20 10*3/mm3    Immature Grans, Absolute 0.04 0.00 - 0.05 10*3/mm3    nRBC 0.0 0.0 - 0.2 /100 WBC   ECG 12 Lead    Collection Time: 01/01/21 10:12 PM   Result Value Ref Range    QT Interval 365 ms   Urinalysis With Microscopic If Indicated (No Culture) - Urine, Catheter    Collection Time: 01/02/21 12:08 AM    Specimen: Urine, Catheter   Result Value Ref Range    Color, UA Yellow Yellow, Straw    Appearance, UA Clear Clear    pH, UA 6.0 5.0 - 8.0    Specific Gravity, UA 1.017 1.005 - 1.030    Glucose, UA Negative Negative    Ketones, UA Negative Negative    Bilirubin, UA Negative Negative    Blood, UA Trace (A) Negative    Protein, UA Negative Negative    Leuk Esterase, UA Trace (A) Negative    Nitrite, UA Positive (A) Negative    Urobilinogen, UA 0.2 E.U./dL 0.2 - 1.0 E.U./dL   Urinalysis, Microscopic Only - Urine, Catheter    Collection Time: 01/02/21 12:08 AM    Specimen: Urine, Catheter   Result Value Ref Range    RBC, UA 0-2 None Seen, 0-2 /HPF    WBC, UA 13-20 (A) None Seen, 0-2 /HPF    Bacteria, UA 3+ (A) None Seen /HPF    Squamous Epithelial Cells, UA 0-2 None Seen, 0-2 /HPF    Hyaline Casts, UA 0-2 None Seen /LPF    Methodology Automated Microscopy        Ordered the above labs and independently reviewed the results.        RADIOLOGY  No Radiology Exams Resulted Within Past 24 Hours    I ordered the above noted radiological studies. Reviewed by me and discussed with radiologist.  See dictation for official radiology interpretation.      PROCEDURES    Procedures      MEDICATIONS GIVEN IN ER    Medications   sodium chloride 0.9 % flush 10 mL (has no administration in time range)   cefTRIAXone (ROCEPHIN) IVPB 1 g (0 g Intravenous Stopped 1/2/21 0220)         PROGRESS, DATA ANALYSIS, CONSULTS, AND MEDICAL DECISION MAKING    All labs have been independently reviewed by me.  All radiology  studies have been reviewed by me and discussed with radiologist dictating the report.   EKG's independently viewed and interpreted by me.  Discussion below represents my analysis of pertinent findings related to patient's condition, differential diagnosis, treatment plan and final disposition.        ED Course as of Jan 02 0240   Sat Jan 02, 2021   0238 EKG at 2212  Sinus rhythm at 75  Normal WA, QRS and QT  No acute ST segment abnormalities to suggest ischemia, and is unchanged compared to August 7, 2020      [DP]   0238 CT scan of the head shows no active disease    [DP]   0238 Chest x-ray and pelvis are unremarkable    [DP]   0238 Plain film of the pelvis shows no acute fracture    [DP]   0239 She does have a mild leukopenia    [DP]   0239 Chemistry shows some mild chronic transaminase elevation    [DP]   0239 The leukopenia appears to be new    [DP]   0239 Lactate normal    [DP]   0239 Procalcitonin normal    [DP]   0239 Troponin and proBNP normal    [DP]   0239 Urinalysis shows 3+ bacteria and 13-20 whites.    [DP]   0239 Of culture the urine and start her empirically on IV Rocephin    [DP]   0239 Patient does not appear to be septic.  Does not appear to be significantly injured from her fall, Covid is pending but chest x-ray is not suggestive and she is had no symptoms that would make me believe she has it.    [DP]   0240 I spoke with nurse practitioner FRED who agrees to meet the patient on behalf of Dr. Carrillo to a telemetry bed for further.  Family's been notified    [DP]      ED Course User Index  [DP] Alirio Morales MD           PPE: Both the patient and I wore a surgical mask throughout the entire patient encounter. I wore protective goggles, gown and gloves    AS OF 02:40 EST VITALS:    BP - 114/70  HR - 72  TEMP - 98.6 °F (37 °C) (Oral)  O2 SATS - 92%        DIAGNOSIS  Final diagnoses:   Acute UTI   Frequent falls         DISPOSITION  Admit to telemetry           Alirio Morales MD  01/02/21 0240

## 2021-01-02 NOTE — ED NOTES
Pt to ED from [facility] per [facility] with report of unknown covid status, call for fall tonight at 2000.  Facility report pt landed on right side, but pt c/o left sided pain.      Pt denies hitting head and reports she fell on carpet.  While EMS was on scene, pt c/o chest tightness that began at 0800.  BS diminished BL per EMS.  Pt has cough which she reports is normal for her.  Pt received breathing treatment per facility.      Pt received 1 SL nitro spray and 324mg ASA per EMS.  No change in chest tightness after med administration.    All triage performed with this RN wearing appropriate PPE.  Pt placed in mask upon arrival to ED.     Wendy Robison, RN  01/01/21 0282

## 2021-01-02 NOTE — PLAN OF CARE
Problem: Adult Inpatient Plan of Care  Goal: Plan of Care Review  1/2/2021 0811 by Aria Rubin, ABELINO  Outcome: Ongoing, Progressing  Flowsheets (Taken 1/2/2021 0811)  Plan of Care Reviewed With: patient  Outcome Summary: Alert to self, incont. B/B, purewick placed and brief changed. NPO, admitted from [facility] has a history of multiple falls, pelvis xray was neg and showed hardware in L hip from previous fracture. BM this shift, Will CTM   Goal Outcome Evaluation:  Plan of Care Reviewed With: patient     Outcome Summary: Alert to self, incont. B/B, purewick placed and brief changed. NPO, admitted from [facility] home has a history of multiple falls, pelvis xray was neg and showed hardware in L hip from previous fracture. BM this shift, Will CTM

## 2021-01-02 NOTE — ED NOTES
Spoke to daughter Lucio updated her on plan of care and plan for admission.     Jayda Antonio, RN  01/02/21 0139

## 2021-01-02 NOTE — ED NOTES
Spoke to pt's daughter. Updated her on the room assignment and the number for 5N. Per daughter pt drinks bourbon at night. States the pt is a DNR. Per daughter pt uses a walker. States pt does walk a little stiff legged.      Jayda Antonio, ABELINO  01/02/21 0230       Jayda Antonio, ABELINO  01/02/21 0234

## 2021-01-02 NOTE — H&P
Patient Name:  Alona Cisneros  YOB: 1935  MRN:  [id number]  Admit Date:  1/1/2021  Patient Care Team:  Sapphire Mckeon MD as PCP - General (Geriatric Medicine)      Subjective   History Present Illness     Chief Complaint   Patient presents with   • Fall   • Shortness of Breath       Ms. Cisneros is a 85 y.o. former smoker with a history of hypertension and dementia that presents to [facility] due to a fall and shortness of breath.  She is a poor historian on exam so history is obtained from medical records.  Per ED notes, she had a mechanical fall at her nursing home last night.  Per nursing home staff, she complained of pain in her chest and shortness of breath following the fall.  Staff did not believe she hit her head.  She denies chest pain and shortness of breath at this time.  She denies headache, dizziness, fever, chills, and urinary symptoms.  Urinalysis performed in the ED revealed 3+ bacteria, WBCs, trace leukocytes, nitrites, and trace blood.  She received a dose of Rocephin in the ED.       History of Present Illness  Review of Systems   Constitutional: Negative for chills and fever.   HENT: Negative for congestion and sore throat.    Eyes: Negative for photophobia and visual disturbance.   Respiratory: Negative for cough, chest tightness and shortness of breath.    Cardiovascular: Negative for chest pain, palpitations and leg swelling.   Gastrointestinal: Negative for abdominal pain, nausea and vomiting.   Endocrine: Negative for polydipsia, polyphagia and polyuria.   Genitourinary: Negative for decreased urine volume, dysuria, flank pain, frequency, hematuria and urgency.   Musculoskeletal: Negative for back pain and neck pain.   Skin: Negative for rash and wound.   Neurological: Negative for dizziness, weakness, numbness and headaches.        Personal History     Past Medical History:   Diagnosis Date   • Anxiety    • COPD (chronic obstructive pulmonary disease)  (CMS/HCC)    • Degeneration of lumbar or lumbosacral intervertebral disc 6/1/2017   • Emphysema lung (CMS/HCC)    • Hypertension    • Low back pain    • Nausea    • Rectal prolapse    • Tremors of nervous system      Past Surgical History:   Procedure Laterality Date   • BACK SURGERY      4 times lspine    • CATARACT EXTRACTION Bilateral    • COLONOSCOPY  12/28/2011   • FEMUR PERIPROSTHETIC FRACTURE REPAIR Right 2/18/2018    Procedure: RIGHT FEMUR PERIPROSTHETIC FRACTURE REPAIR OPEN REDUCTION INTERNAL FIXATION ;  Surgeon: Nima Rausch MD;  Location: [facility] OR;  Service:    • JOINT REPLACEMENT      right hip      Family History   Problem Relation Age of Onset   • COPD Father      Social History     Tobacco Use   • Smoking status: Former Smoker     Types: Cigarettes   • Smokeless tobacco: Never Used   Substance Use Topics   • Alcohol use: Yes   • Drug use: No     Medications Prior to Admission   Medication Sig Dispense Refill Last Dose   • albuterol (ACCUNEB) 1.25 MG/3ML nebulizer solution       • amLODIPine (NORVASC) 2.5 MG tablet Take 2.5 mg by mouth Daily. 1/2 tab in am      • arformoterol (BROVANA) 15 MCG/2ML nebulizer solution 15 mcg 2 (Two) Times a Day.      • budesonide (PULMICORT) 0.5 MG/2ML nebulizer solution USE 1 VIAL IN NEBULIZER TWICE DAILY      • citalopram (CeleXA) 20 MG tablet Take 20 mg by mouth Daily.      • donepezil (ARICEPT) 5 MG tablet Take 10 mg by mouth Every Night.      • mirtazapine (REMERON) 7.5 MG half tablet Take 7.5 mg by mouth Every Night.      • Multiple Vitamin (MV-ONE PO) Take 1 tablet by mouth daily.      • primidone (MYSOLINE) 50 MG tablet Take 150 mg by mouth Every Morning.      • Probiotic Product (PROBIOTIC-10) chewable tablet Chew 1 tablet.        Allergies:  No Known Allergies    Objective    Objective     Vital Signs  Temp:  [98.6 °F (37 °C)-99.1 °F (37.3 °C)] 99.1 °F (37.3 °C)  Heart Rate:  [69-96] 86  Resp:  [18] 18  BP: (110-169)/(61-98) 169/98  SpO2:  [92 %-96 %] 94  %  on  Flow (L/min):  [39] 39;   Device (Oxygen Therapy): room air  Body mass index is 16.06 kg/m².    Physical Exam  Vitals signs and nursing note reviewed.   Constitutional:       Appearance: Normal appearance.      Comments: Frail, elderly   HENT:      Head: Normocephalic and atraumatic.      Nose: Nose normal.      Mouth/Throat:      Mouth: Mucous membranes are dry.      Pharynx: Oropharynx is clear.   Eyes:      Extraocular Movements: Extraocular movements intact.      Conjunctiva/sclera: Conjunctivae normal.   Neck:      Musculoskeletal: Normal range of motion and neck supple.   Cardiovascular:      Rate and Rhythm: Normal rate and regular rhythm.      Pulses: Normal pulses.      Heart sounds: Normal heart sounds.   Pulmonary:      Effort: Pulmonary effort is normal.      Breath sounds: Examination of the right-lower field reveals decreased breath sounds. Examination of the left-lower field reveals decreased breath sounds. Decreased breath sounds present.   Abdominal:      General: Bowel sounds are normal. There is no distension.      Palpations: Abdomen is soft.      Tenderness: There is no abdominal tenderness. There is no guarding.   Musculoskeletal: Normal range of motion.   Skin:     General: Skin is warm and dry.   Neurological:      General: No focal deficit present.      Mental Status: She is alert.      Comments: Oriented to person and place only. Follows commands, moves all extremities   Psychiatric:         Mood and Affect: Mood normal.         Behavior: Behavior normal.         Results Review:  I reviewed the patient's new clinical results.  I reviewed the patient's new imaging results and agree with the interpretation.  I reviewed the patient's other test results and agree with the interpretation  I personally viewed and interpreted the patient's EKG/Telemetry data  Discussed with ED provider.    Lab Results (last 24 hours)     Procedure Component Value Units Date/Time    CBC & Differential  [[id number]]  (Abnormal) Collected: 01/01/21 2206    Specimen: Blood Updated: 01/01/21 2226    Narrative:      The following orders were created for panel order CBC & Differential.  Procedure                               Abnormality         Status                     ---------                               -----------         ------                     CBC Auto Differential[[id number]]        Abnormal            Final result                 Please view results for these tests on the individual orders.    Comprehensive Metabolic Panel [[id number]]  (Abnormal) Collected: 01/01/21 2206    Specimen: Blood Updated: 01/01/21 2242     Glucose 112 mg/dL      BUN 22 mg/dL      Creatinine 0.83 mg/dL      Sodium 136 mmol/L      Potassium 4.3 mmol/L      Comment: Slight hemolysis detected by analyzer. Results may be affected.        Chloride 99 mmol/L      CO2 27.0 mmol/L      Calcium 8.7 mg/dL      Total Protein 6.7 g/dL      Albumin 3.90 g/dL      ALT (SGPT) 36 U/L      AST (SGOT) 34 U/L      Alkaline Phosphatase 99 U/L      Total Bilirubin 0.2 mg/dL      eGFR Non African Amer 66 mL/min/1.73      Globulin 2.8 gm/dL      A/G Ratio 1.4 g/dL      BUN/Creatinine Ratio 26.5     Anion Gap 10.0 mmol/L     Narrative:      GFR Normal >60  Chronic Kidney Disease <60  Kidney Failure <15      BNP [[id number]]  (Normal) Collected: 01/01/21 2206    Specimen: Blood Updated: 01/01/21 2244     proBNP 214.5 pg/mL     Narrative:      Among patients with dyspnea, NT-proBNP is highly sensitive for the detection of acute congestive heart failure. In addition NT-proBNP of <300 pg/ml effectively rules out acute congestive heart failure with 99% negative predictive value.    Results may be falsely decreased if patient taking Biotin.      Troponin [[id number]]  (Normal) Collected: 01/01/21 2206    Specimen: Blood Updated: 01/01/21 2244     Troponin T <0.010 ng/mL     Narrative:      Troponin T Reference Range:  <= 0.03 ng/mL-   Negative for AMI  >0.03  "ng/mL-     Abnormal for myocardial necrosis.  Clinicians would have to utilize clinical acumen, EKG, Troponin and serial changes to determine if it is an Acute Myocardial Infarction or myocardial injury due to an underlying chronic condition.       Results may be falsely decreased if patient taking Biotin.      Procalcitonin [[id number]]  (Normal) Collected: 01/01/21 2206    Specimen: Blood Updated: 01/01/21 2253     Procalcitonin 0.11 ng/mL     Narrative:      As a Marker for Sepsis (Non-Neonates):   1. <0.5 ng/mL represents a low risk of severe sepsis and/or septic shock.  1. >2 ng/mL represents a high risk of severe sepsis and/or septic shock.    As a Marker for Lower Respiratory Tract Infections that require antibiotic therapy:  PCT on Admission     Antibiotic Therapy             6-12 Hrs later  > 0.5                Strongly Recommended            >0.25 - <0.5         Recommended  0.1 - 0.25           Discouraged                   Remeasure/reassess PCT  <0.1                 Strongly Discouraged          Remeasure/reassess PCT      As 28 day mortality risk marker: \"Change in Procalcitonin Result\" (> 80 % or <=80 %) if Day 0 (or Day 1) and Day 4 values are available. Refer to http://www.[employer]-pct-calculator.com/   Change in PCT <=80 %   A decrease of PCT levels below or equal to 80 % defines a positive change in PCT test result representing a higher risk for 28-day all-cause mortality of patients diagnosed with severe sepsis or septic shock.  Change in PCT > 80 %   A decrease of PCT levels of more than 80 % defines a negative change in PCT result representing a lower risk for 28-day all-cause mortality of patients diagnosed with severe sepsis or septic shock.                Results may be falsely decreased if patient taking Biotin.     Lactic Acid, Plasma [[id number]]  (Normal) Collected: 01/01/21 2206    Specimen: Blood Updated: 01/01/21 2232     Lactate 1.7 mmol/L     COVID PRE-OP / PRE-PROCEDURE SCREENING ORDER " (NO ISOLATION) - Swab, Nasopharynx [[id number]] Collected: 01/01/21 2206    Specimen: Swab from Nasopharynx Updated: 01/01/21 2214    Narrative:      The following orders were created for panel order COVID PRE-OP / PRE-PROCEDURE SCREENING ORDER (NO ISOLATION) - Swab, Nasopharynx.  Procedure                               Abnormality         Status                     ---------                               -----------         ------                     COVID-19,[facility] PANTHER,...[[id number]]                      In process                   Please view results for these tests on the individual orders.    CBC Auto Differential [[id number]]  (Abnormal) Collected: 01/01/21 2206    Specimen: Blood Updated: 01/01/21 2226     WBC 2.19 10*3/mm3      RBC 4.45 10*6/mm3      Hemoglobin 13.6 g/dL      Hematocrit 40.8 %      MCV 91.7 fL      MCH 30.6 pg      MCHC 33.3 g/dL      RDW 13.1 %      RDW-SD 44.4 fl      MPV 10.3 fL      Platelets 188 10*3/mm3      Neutrophil % 48.9 %      Lymphocyte % 28.3 %      Monocyte % 19.2 %      Eosinophil % 0.9 %      Basophil % 0.9 %      Immature Grans % 1.8 %      Neutrophils, Absolute 1.07 10*3/mm3      Lymphocytes, Absolute 0.62 10*3/mm3      Monocytes, Absolute 0.42 10*3/mm3      Eosinophils, Absolute 0.02 10*3/mm3      Basophils, Absolute 0.02 10*3/mm3      Immature Grans, Absolute 0.04 10*3/mm3      nRBC 0.0 /100 WBC     COVID-19,[facility]TIMA PANTHER,[facility], NP/OP SWAB IN UTM/VTM/SALINE TRANSPORT MEDIA,24 HR TAT - Swab, Nasopharynx [[id number]] Collected: 01/01/21 2206    Specimen: Swab from Nasopharynx Updated: 01/01/21 2214    Urinalysis With Microscopic If Indicated (No Culture) - Urine, Catheter [[id number]]  (Abnormal) Collected: 01/02/21 0008    Specimen: Urine, Catheter Updated: 01/02/21 0100     Color, UA Yellow     Appearance, UA Clear     pH, UA 6.0     Specific Gravity, UA 1.017     Glucose, UA Negative     Ketones, UA Negative     Bilirubin, UA Negative     Blood, UA Trace      Protein, UA Negative     Leuk Esterase, UA Trace     Nitrite, UA Positive     Urobilinogen, UA 0.2 E.U./dL    Urinalysis, Microscopic Only - Urine, Catheter [[id number]]  (Abnormal) Collected: 01/02/21 0008    Specimen: Urine, Catheter Updated: 01/02/21 0103     RBC, UA 0-2 /HPF      WBC, UA 13-20 /HPF      Bacteria, UA 3+ /HPF      Squamous Epithelial Cells, UA 0-2 /HPF      Hyaline Casts, UA 0-2 /LPF      Methodology Automated Microscopy    COVID PRE-OP / PRE-PROCEDURE SCREENING ORDER (NO ISOLATION) - Swab, Nasopharynx [[id number]]  (Abnormal) Collected: 01/02/21 0255    Specimen: Swab from Nasopharynx Updated: 01/02/21 0520    Narrative:      The following orders were created for panel order COVID PRE-OP / PRE-PROCEDURE SCREENING ORDER (NO ISOLATION) - Swab, Nasopharynx.  Procedure                               Abnormality         Status                     ---------                               -----------         ------                     COVID-19,[facility]...[[id number]]  Abnormal            Final result                 Please view results for these tests on the individual orders.    COVID-19,[facility] CEPHEID, NP SWAB IN TRANSPORT MEDIA 8-12 HR TAT - Swab, Nasopharynx [[id number]]  (Abnormal) Collected: 01/02/21 0255    Specimen: Swab from Nasopharynx Updated: 01/02/21 0520     COVID19 Detected    Narrative:      Fact sheet for providers: https://www.fda.gov/media/780015/download     Fact sheet for patients: https://www.fda.gov/media/418087/download          Imaging Results (Last 24 Hours)     Procedure Component Value Units Date/Time    XR Pelvis 1 or 2 View [[id number]] Resulted: 01/02/21 0027     Updated: 01/02/21 0028    XR Chest 1 View [[id number]] Resulted: 01/01/21 2230     Updated: 01/01/21 2230    CT Head Without Contrast [[id number]] Resulted: 01/01/21 2220     Updated: 01/01/21 2221               ECG 12 Lead   Preliminary Result   HEART RATE= 75  bpm   RR Interval= 800  ms   CA Interval= 172   ms   P Horizontal Axis= 5  deg   P Front Axis= 66  deg   QRSD Interval= 69  ms   QT Interval= 365  ms   QRS Axis= 69  deg   T Wave Axis= 72  deg   - ABNORMAL ECG -   Sinus rhythm   Low voltage, precordial leads   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 Not sure of the cause of leukopenia, WBC previously has been WNL. Repeat CBC in AM  -Will continue Rocephin for now  -Urine culture pending    Fall  -CT head pending  -PT consult    Hypertension  -Blood pressures stable. Continue home regimen  -Monitor    COPD/COVID 19  -She denies chest pain and SOB on exam  -Respiratory status stable on 1L NC . She does not usually wear supplemental oxygen, wean as able  -RVP positive for COVID 19. Monitor respiratory status closely   -Will hold her home inhalers and initiate Symbicort daily    Dementia  -Mood stable. Continue home medications    -I discussed the patients findings and my recommendations with patient.    VTE Prophylaxis - SCDs.  Code Status - Full code.       KENNEDI Sexton  [facility]  01/02/21  05:30 EST

## 2021-01-02 NOTE — ED NOTES
Nursing report ED to floor  Alona Cisneros  85 y.o.  female    HPI (triage note):   Chief Complaint   Patient presents with   • Fall   • Shortness of Breath       Admitting doctor:   oRnn Carrillo MD    Admitting diagnosis:   The primary encounter diagnosis was Acute UTI. A diagnosis of Frequent falls was also pertinent to this visit.    Code status:   Current Code Status     Date Active Code Status Order ID Comments User Context       Prior    Advance Care Planning Activity          Allergies:   Patient has no known allergies.    Weight:   There were no vitals filed for this visit.    Most recent vitals:   Vitals:    01/02/21 0000 01/02/21 0002 01/02/21 0030 01/02/21 0031   BP: 122/70  137/75    Patient Position:       Pulse: 72  76 73   Resp:  18     Temp:       TempSrc:       SpO2: 92%  95% 94%       Active LDAs/IV Access:   Lines, Drains & Airways    Active LDAs     Name:   Placement date:   Placement time:   Site:   Days:    Peripheral IV 01/01/21 2152 Anterior;Left;Proximal Forearm   01/01/21 2152    Forearm   less than 1                Labs (abnormal labs have a star):   Labs Reviewed   COMPREHENSIVE METABOLIC PANEL - Abnormal; Notable for the following components:       Result Value    Glucose 112 (*)     ALT (SGPT) 36 (*)     AST (SGOT) 34 (*)     BUN/Creatinine Ratio 26.5 (*)     All other components within normal limits    Narrative:     GFR Normal >60  Chronic Kidney Disease <60  Kidney Failure <15     URINALYSIS W/ MICROSCOPIC IF INDICATED (NO CULTURE) - Abnormal; Notable for the following components:    Blood, UA Trace (*)     Leuk Esterase, UA Trace (*)     Nitrite, UA Positive (*)     All other components within normal limits   CBC WITH AUTO DIFFERENTIAL - Abnormal; Notable for the following components:    WBC 2.19 (*)     Monocyte % 19.2 (*)     Immature Grans % 1.8 (*)     Neutrophils, Absolute 1.07 (*)     Lymphocytes, Absolute 0.62 (*)     All other components within normal limits  "  URINALYSIS, MICROSCOPIC ONLY - Abnormal; Notable for the following components:    WBC, UA 13-20 (*)     Bacteria, UA 3+ (*)     All other components within normal limits   BNP (IN-HOUSE) - Normal    Narrative:     Among patients with dyspnea, NT-proBNP is highly sensitive for the detection of acute congestive heart failure. In addition NT-proBNP of <300 pg/ml effectively rules out acute congestive heart failure with 99% negative predictive value.    Results may be falsely decreased if patient taking Biotin.     TROPONIN (IN-HOUSE) - Normal    Narrative:     Troponin T Reference Range:  <= 0.03 ng/mL-   Negative for AMI  >0.03 ng/mL-     Abnormal for myocardial necrosis.  Clinicians would have to utilize clinical acumen, EKG, Troponin and serial changes to determine if it is an Acute Myocardial Infarction or myocardial injury due to an underlying chronic condition.       Results may be falsely decreased if patient taking Biotin.     PROCALCITONIN - Normal    Narrative:     As a Marker for Sepsis (Non-Neonates):   1. <0.5 ng/mL represents a low risk of severe sepsis and/or septic shock.  1. >2 ng/mL represents a high risk of severe sepsis and/or septic shock.    As a Marker for Lower Respiratory Tract Infections that require antibiotic therapy:  PCT on Admission     Antibiotic Therapy             6-12 Hrs later  > 0.5                Strongly Recommended            >0.25 - <0.5         Recommended  0.1 - 0.25           Discouraged                   Remeasure/reassess PCT  <0.1                 Strongly Discouraged          Remeasure/reassess PCT      As 28 day mortality risk marker: \"Change in Procalcitonin Result\" (> 80 % or <=80 %) if Day 0 (or Day 1) and Day 4 values are available. Refer to http://www.[facility]-pct-calculator.com/   Change in PCT <=80 %   A decrease of PCT levels below or equal to 80 % defines a positive change in PCT test result representing a higher risk for 28-day all-cause mortality of patients " diagnosed with severe sepsis or septic shock.  Change in PCT > 80 %   A decrease of PCT levels of more than 80 % defines a negative change in PCT result representing a lower risk for 28-day all-cause mortality of patients diagnosed with severe sepsis or septic shock.                Results may be falsely decreased if patient taking Biotin.    LACTIC ACID, PLASMA - Normal   COVID PRE-OP / PRE-PROCEDURE SCREENING ORDER (NO ISOLATION)    Narrative:     The following orders were created for panel order COVID PRE-OP / PRE-PROCEDURE SCREENING ORDER (NO ISOLATION) - Swab, Nasopharynx.  Procedure                               Abnormality         Status                     ---------                               -----------         ------                     COVID-19,APTIMA PANTHER,...[[id number]]                      In process                   Please view results for these tests on the individual orders.   COVID-19,[facility]TIMA PANTHER,[facility]-HOUSE,NP/OP SWAB IN UTM/VTM/SALINE TRANSPORT MEDIA,24 HR TAT   URINALYSIS W/ CULTURE IF INDICATED   CBC AND DIFFERENTIAL    Narrative:     The following orders were created for panel order CBC & Differential.  Procedure                               Abnormality         Status                     ---------                               -----------         ------                     CBC Auto Differential[[id number]]        Abnormal            Final result                 Please view results for these tests on the individual orders.       EKG:   ECG 12 Lead   Preliminary Result   HEART RATE= 75  bpm   RR Interval= 800  ms   [place] Interval= 172  ms   P Horizontal Axis= 5  deg   P Front Axis= 66  deg   QRSD Interval= 69  ms   QT Interval= 365  ms   QRS Axis= 69  deg   T Wave Axis= 72  deg   - ABNORMAL ECG -   Sinus rhythm   Low voltage, precordial leads   Probable anterolateral infarct, old   Electronically Signed By:    Date and Time of Study: 2021-01-01 22:12:16          Meds given in ED:    Medications   sodium chloride 0.9 % flush 10 mL (has no administration in time range)   cefTRIAXone (ROCEPHIN) IVPB 1 g (0 g Intravenous Stopped 1/2/21 0220)       Imaging results:  No radiology results for the last day    Ambulatory status:   - bedrest      Social issues:   Social History     Socioeconomic History   • Marital status:      Spouse name: Not on file   • Number of children: Not on file   • Years of education: Not on file   • Highest education level: Not on file   Tobacco Use   • Smoking status: Former Smoker     Types: Cigarettes   • Smokeless tobacco: Never Used   Substance and Sexual Activity   • Alcohol use: Yes   • Drug use: No   • Sexual activity: Defer    Nursing report ED to floor       Jayda Antonio RN  01/02/21 0220

## 2021-01-02 NOTE — ED NOTES
This RN wore appropriate PPE in room including gloves, mask and goggles. Hand hygeine performed prior to entering room and prior to leaving room .      Jayda Antonio RN  01/01/21 7558

## 2021-01-03 LAB
DEPRECATED RDW RBC AUTO: 43.4 FL (ref 37–54)
ERYTHROCYTE [DISTWIDTH] IN BLOOD BY AUTOMATED COUNT: 13.2 % (ref 12.3–15.4)
HCT VFR BLD AUTO: 39.3 % (ref 34–46.6)
HGB BLD-MCNC: 13.1 G/DL (ref 12–15.9)
MCH RBC QN AUTO: 30.1 PG (ref 26.6–33)
MCHC RBC AUTO-ENTMCNC: 33.3 G/DL (ref 31.5–35.7)
MCV RBC AUTO: 90.3 FL (ref 79–97)
PLATELET # BLD AUTO: 180 10*3/MM3 (ref 140–450)
PMV BLD AUTO: 10.6 FL (ref 6–12)
RBC # BLD AUTO: 4.35 10*6/MM3 (ref 3.77–5.28)
WBC # BLD AUTO: 2.38 10*3/MM3 (ref 3.4–10.8)

## 2021-01-03 PROCEDURE — 85027 COMPLETE CBC AUTOMATED: CPT | Performed by: HOSPITALIST

## 2021-01-03 PROCEDURE — 25010000002 CEFTRIAXONE PER 250 MG: Performed by: NURSE PRACTITIONER

## 2021-01-03 RX ADMIN — SODIUM CHLORIDE, PRESERVATIVE FREE 10 ML: 5 INJECTION INTRAVENOUS at 00:34

## 2021-01-03 RX ADMIN — CEFTRIAXONE SODIUM 1 G: 1 INJECTION, SOLUTION INTRAVENOUS at 00:34

## 2021-01-03 RX ADMIN — CITALOPRAM 20 MG: 20 TABLET, FILM COATED ORAL at 08:18

## 2021-01-03 RX ADMIN — DONEPEZIL HYDROCHLORIDE 10 MG: 10 TABLET, FILM COATED ORAL at 20:49

## 2021-01-03 RX ADMIN — SODIUM CHLORIDE, PRESERVATIVE FREE 10 ML: 5 INJECTION INTRAVENOUS at 08:18

## 2021-01-03 RX ADMIN — SODIUM CHLORIDE, PRESERVATIVE FREE 10 ML: 5 INJECTION INTRAVENOUS at 20:48

## 2021-01-03 RX ADMIN — AMLODIPINE BESYLATE 2.5 MG: 2.5 TABLET ORAL at 08:18

## 2021-01-03 NOTE — PROGRESS NOTES
"    DAILY PROGRESS NOTE  [facility]    Patient Identification:  Name: Alona Cisneros  Age: 85 y.o.  Sex: female  :  1935  MRN: [id number]         Primary Care Physician: Sapphire Mckeon MD    Subjective:  Interval History: History and ROS unreliable given advanced dementia.  She not really voicing anything specific but admits to generalized weakness.    Objective: Advanced dementia.  Nontoxic appearing.  Flat and withdrawn.  No family at bedside    Scheduled Meds:amLODIPine, 2.5 mg, Oral, Daily  budesonide-formoterol, 2 puff, Inhalation, BID - RT  cefTRIAXone, 1 g, Intravenous, Q24H  citalopram, 20 mg, Oral, Daily  donepezil, 10 mg, Oral, Nightly  sodium chloride, 10 mL, Intravenous, Q12H      Continuous Infusions:     Vital signs in last 24 hours:  Temp:  [96.8 °F (36 °C)-98.3 °F (36.8 °C)] 97.9 °F (36.6 °C)  Heart Rate:  [79-96] 79  Resp:  [16-20] 20  BP: (125-131)/(63-79) 125/77    Intake/Output:    Intake/Output Summary (Last 24 hours) at 1/3/2021 1020  Last data filed at 1/3/2021 0500  Gross per 24 hour   Intake 270 ml   Output 650 ml   Net -380 ml       Exam:  /77 (BP Location: Left arm, Patient Position: Lying)   Pulse 79   Temp 97.9 °F (36.6 °C) (Oral)   Resp 20   Ht 167.6 cm (66\")   Wt 50.5 kg (111 lb 4.8 oz)   SpO2 96%   BMI 17.96 kg/m²     General Appearance:    Alert, cooperative, demented                          Head:    Normocephalic, without obvious abnormality, atraumatic                         Throat:   Oral mucosa pink and moist                           Neck:   Supple, symmetrical, trachea midline, no JVD                         Lungs:    Clear to auscultation bilaterally, respirations unlabored                          Heart:    Regular rate and rhythm, S1 and S2 normal                  Abdomen:     Soft, nontender, bowel sounds active                 Extremities: Globally weak though moving all, no cyanosis or edema                        Pulses:   " Pulses palpable in lower extremities                  Neurologic:   CNII-XII intact     Data Review:  Labs in chart were reviewed.    Assessment:  Active Hospital Problems    Diagnosis  POA   • **Acute UTI [N39.0]  Yes   • Dementia (CMS/[facility]) [F03.90]  Unknown   • COPD (chronic obstructive pulmonary disease) (CMS/[facility]) [J44.9]  Unknown   • COVID-19 [U07.1]  Unknown   • Hypertension [I10]  Yes      Resolved Hospital Problems   No resolved problems to display.       Plan:    UTI -Rocephin D3 with culture pending but I am not sure this was sent by ER at time of admission despite UA showing 13-20 WBCs and 3+ bacteria     Covid -incidental finding as patient pretty much asymptomatic.  Current O2 saturations 100%              -Supportive care - no indication for steroids or additional treatment at this time     Fall with no acute disease per CT head -PT/fall precautions     Dementia without behavior disturbance     HTN stable     Leukocytopenia improving secondary to viral illness     Reached out to family and called [phone]               -CODE STATUS DNR   -d/w Paula - very informed/reasonable - has noted advancement significant decline over last year    -Lives in [facility] - [facility]    Taiwo Siegel MD  1/3/2021  10:20 EST

## 2021-01-03 NOTE — PLAN OF CARE
Goal Outcome Evaluation:  Plan of Care Reviewed With: patient  Progress: improving   Pt disoriented x3.  VSS.  Will CTM.

## 2021-01-04 PROCEDURE — 97110 THERAPEUTIC EXERCISES: CPT

## 2021-01-04 PROCEDURE — 97162 PT EVAL MOD COMPLEX 30 MIN: CPT

## 2021-01-04 PROCEDURE — 25010000002 CEFTRIAXONE PER 250 MG: Performed by: NURSE PRACTITIONER

## 2021-01-04 PROCEDURE — 97530 THERAPEUTIC ACTIVITIES: CPT

## 2021-01-04 RX ORDER — SACCHAROMYCES BOULARDII 250 MG
250 CAPSULE ORAL 2 TIMES DAILY
Status: DISCONTINUED | OUTPATIENT
Start: 2021-01-04 | End: 2021-01-05 | Stop reason: HOSPADM

## 2021-01-04 RX ADMIN — Medication 250 MG: at 14:02

## 2021-01-04 RX ADMIN — AMLODIPINE BESYLATE 2.5 MG: 2.5 TABLET ORAL at 08:58

## 2021-01-04 RX ADMIN — Medication 250 MG: at 20:24

## 2021-01-04 RX ADMIN — CITALOPRAM 20 MG: 20 TABLET, FILM COATED ORAL at 08:58

## 2021-01-04 RX ADMIN — SODIUM CHLORIDE, PRESERVATIVE FREE 10 ML: 5 INJECTION INTRAVENOUS at 11:50

## 2021-01-04 RX ADMIN — CEFTRIAXONE SODIUM 1 G: 1 INJECTION, SOLUTION INTRAVENOUS at 00:53

## 2021-01-04 RX ADMIN — DONEPEZIL HYDROCHLORIDE 10 MG: 10 TABLET, FILM COATED ORAL at 20:24

## 2021-01-04 RX ADMIN — SODIUM CHLORIDE, PRESERVATIVE FREE 10 ML: 5 INJECTION INTRAVENOUS at 20:24

## 2021-01-04 NOTE — PLAN OF CARE
Goal Outcome Evaluation:  Plan of Care Reviewed With: patient  Progress: improving     Pt rested short periods through night. Denies pain, congested sounding cough noted. Tolerating room air. Turn and incontinence care provided. Purewick in place, oriented to person and place. VSS, cont to monitor.

## 2021-01-04 NOTE — PROGRESS NOTES
"Adult Nutrition  Assessment/PES    Patient Name:  Alona Cisneros  YOB: 1935  MRN: [id number]  Admit Date:  1/1/2021    Assessment Date:  1/4/2021    Comments:  Nutrition assessment completed for skin issues Pressure injury stage 1 spine. BMI 18kg/m2. Diet Rx Regular with 25-50% intake. Will add supplements and monitor intake.    Reason for Assessment     Row Name 01/04/21 1326          Reason for Assessment    Reason For Assessment  identified at risk by screening criteria     Diagnosis  -- UTI, HTN, dementia, covid 19     Identified At Risk by Screening Criteria  large or nonhealing wound, burn or pressure injury         Nutrition/Diet History     Row Name 01/04/21 1326          Nutrition/Diet History    Typical Food/Fluid Intake  po 25-50%         Anthropometrics     Row Name 01/04/21 1327          Anthropometrics    Height  167.6 cm (65.98\")     Weight  50.5 kg (111 lb 4.8 oz) not weighed by RD        Ideal Body Weight (IBW)    Ideal Body Weight (IBW) (kg)  59.54     % Ideal Body Weight  84.79        Usual Body Weight (UBW)    Weight Loss Time Frame  10lbs x 6 mon per wt report        Body Mass Index (BMI)    BMI (kg/m2)  18.01     BMI Assessment  BMI 17-18.4: protein-energy malnutrition grade I         Labs/Tests/Procedures/Meds     Row Name 01/04/21 1328          Labs/Procedures/Meds    Lab Results Reviewed  reviewed     Lab Results Comments  na, glu, alt        Diagnostic Tests/Procedures    Diagnostic Test/Procedure Reviewed  reviewed        Medications    Pertinent Medications Reviewed  reviewed     Pertinent Medications Comments  florastro         Physical Findings     Row Name 01/04/21 1329          Physical Findings    Overall Physical Appearance  underweight     Skin  poor skin integrity/turgor PI to spine         Estimated/Assessed Needs     Row Name 01/04/21 1329 01/04/21 1327       Calculation Measurements    Weight Used For Calculations  50.5 kg (111 lb 5.3 oz)  --    Height  --  " "167.6 cm (65.98\")       Estimated/Assessed Needs    Additional Documentation  KCAL/KG (Group);Protein Requirements (Group);Fluid Requirements (Group)  --       KCAL/KG    KCAL/KG  35 Kcal/Kg (kcal)  --    35 Kcal/Kg (kcal)  1767.5  --       Protein Requirements    Weight Used For Protein Calculations  50.5 kg (111 lb 5.3 oz)  --    Est Protein Requirement Amount (gms/kg)  1.2 gm protein  --    Estimated Protein Requirements (gms/day)  60.6  --       Fluid Requirements    Fluid Requirements (mL/day)  1767  --    RDA Method (mL)  1767  --        Nutrition Prescription Ordered     Row Name 01/04/21 1331          Nutrition Prescription PO    Current PO Diet  Regular         Evaluation of Received Nutrient/Fluid Intake     Row Name 01/04/21 1332          PO Evaluation    % PO Intake  25-50%               Problem/Interventions:  Problem 1     Row Name 01/04/21 1332          Nutrition Diagnoses Problem 1    Problem 1  Inadequate Nutrient Intake     Etiology (related to)  Medical Diagnosis               Intervention Goal     Row Name 01/04/21 1332          Intervention Goal    General  Maintain nutrition     PO  Increase intake;PO intake (%)     PO Intake %  75 %     Weight  Appropriate weight gain         Nutrition Intervention     Row Name 01/04/21 1333          Nutrition Intervention    RD/Tech Action  Follow Tx progress;Care plan reviewd;Encourage intake         Nutrition Prescription     Row Name 01/04/21 1333          Nutrition Prescription PO    PO Prescription  Begin/change supplement     Supplement  Boost Plus         Education/Evaluation     Row Name 01/04/21 1333          Education    Education  Education not appropriate at this time        Monitor/Evaluation    Monitor  Per protocol           Electronically signed by:  Olivia Velazquez RD  01/04/21 13:34 EST  "

## 2021-01-04 NOTE — PLAN OF CARE
Goal Outcome Evaluation:  Plan of Care Reviewed With: patient  Progress: improving   Pt disoriented x3.  VSS.  Pt noted to have coarse breath sounds.  Accumax pump placed.  Q2 turns.  No falls.  Will CTM.

## 2021-01-04 NOTE — PLAN OF CARE
Goal Outcome Evaluation:  Plan of Care Reviewed With: patient  Progress: improving  Outcome Summary: Pt is a 84 yo F who was admitted from AL with falls, UTI, and found to be COVID 19 +. Pt presents to PT with impaired functional mobility and gait secondary to generalized weakness, impaired balance, and decreased activity tolerance. Pt with SHARPE and audible wheezing during activity. O2 sats remained at 91 to 92%. PT may benefit from skilled PT to address strength, mobility, and gait.Patient was intermittently wearing a face mask during this therapy encounter. Therapist used appropriate personal protective equipment including eye protection, mask, and gloves.  Mask used was standard procedure mask. Appropriate PPE was worn during the entire therapy session. Hand hygiene was completed before and after therapy session. Patient is not in enhanced droplet precautions.

## 2021-01-04 NOTE — DISCHARGE PLACEMENT REQUEST
"Alona Cisneros (85 y.o. Female)     Date of Birth Social Security Number Address Home Phone MRN    1935  [facility]  [address] [place] [id number] [phone] [id number]    Baptism Marital Status          Christianity        Admission Date Admission Type Admitting Provider Attending Provider Department, Room/Bed    1/1/21 Urgent Ronn Carrillo MD Masden, Troy Andrew, MD [address], [address]    Discharge Date Discharge Disposition Discharge Destination         Skilled Nursing Facility (DC - External)              Attending Provider: Taiwo Siegel MD    Allergies: No Known Allergies    Isolation: Enh Drop/Con   Infection: COVID (confirmed) (01/02/21)   Code Status: No CPR    Ht: 167.6 cm (66\")   Wt: 50.5 kg (111 lb 4.8 oz)    Admission Cmt: None   Principal Problem: Acute UTI [N39.0]                 Active Insurance as of 1/1/2021     Primary Coverage     Payor Plan Insurance Group Employer/Plan Group    MEDICARE MEDICARE A & B      Payor Plan Address Payor Plan Phone Number Payor Plan Fax Number Effective Dates    [address] [phone]  12/1/2002 - None Entered    [facility] [id number]       Subscriber Name Subscriber Birth Date Member ID       ALONA CISNEROS 1935 [id number]           Secondary Coverage     Payor Plan Insurance Group Employer/Plan Group    [facility] SUPP [id number]     Payor Plan Address Payor Plan Phone Number Payor Plan Fax Number Effective Dates    [address]   12/1/2016 - None Entered    [facility] [id number]       Subscriber Name Subscriber Birth Date Member ID       ALONA CISNEROS 1935 [id number]                 Emergency Contacts      (Rel.) Home Phone Work Phone Mobile Phone    Adam Moni (Daughter) [phone] -- --    Josette Dhaliwal (Daughter) [phone] -- [phone]    AmeliaDanielito (Son) -- -- [phone]              "

## 2021-01-04 NOTE — PROGRESS NOTES
Continued Stay Note  [facility]     Patient Name: Alona Cisneros  MRN: [id number]  Today's Date: 1/4/2021    Admit Date: 1/1/2021    Discharge Plan     Row Name 01/04/21 1314       Plan    Plan  -    Plan Comments  Called [facility] and left  requesting update. Zari RN/CCP    Row Name 01/04/21 1202       Plan    Plan  Return back to [facility] - awaiting confirmation from [facility]    Provided Post Acute Provider List?  Yes    Post Acute Provider List  Nursing Home    Method of Delivery  Telephone    Patient/Family in Agreement with Plan  yes    Plan Comments  CCP spoke with daughter Jannette Mon [phone] via outbound call, introduced self and explained CCP role. Verified facesheet and confirmed pt is a resident of AL at [facility]. Pt uses a walker for mobility, also has a nebulizer. Staff there assist with medication dispensing. Pt has been to [facility] in the past. CCP explained would speak with [facility] and determine if pt can return back, as pt recent had vaccine. Discussed Covid19 SNF facilities and daughter would prefer to [facility] if SNF needed. She would like pt to go back to her AL due to her Dementia. CCP reviewed transportation options and explained hospital unable to guarantee insurance payment for ambulance use. CCP reviewed wc transport or private car transport. She verbalized understanding. Zari RN/CCP        Discharge Codes    No documentation.       Expected Discharge Date and Time     Expected Discharge Date Expected Discharge Time    Jan 4, 2021             Harleen Simmons RN

## 2021-01-04 NOTE — THERAPY EVALUATION
Patient Name: Alona Cisneros  : 1935    MRN: [id number]                              Today's Date: 2021       Admit Date: 2021    Visit Dx:     ICD-10-CM ICD-9-CM   1. Acute UTI  N39.0 599.0   2. Frequent falls  R29.6 V15.88     Patient Active Problem List   Diagnosis   • Degeneration of lumbar or lumbosacral intervertebral disc   • Lumbar neuritis   • Closed displaced oblique fracture of shaft of right femur (CMS/[facility])   • Fall from standing   • Anxiety   • Hypertension   • Tremors of nervous system   • Underweight BMI 18.1   • Osteoporosis with pathological fracture   • Alcohol withdrawal (CMS/[facility])   • History of ORIF-RIGHT FEMUR PERIPROSTHETIC FRACTURE REPAIR OPEN REDUCTION INTERNAL FIXATION - Right   • Acute UTI   • Dementia (CMS/[facility])   • COPD (chronic obstructive pulmonary disease) (CMS/[facility])   • COVID-19     Past Medical History:   Diagnosis Date   • Anxiety    • COPD (chronic obstructive pulmonary disease) (CMS/[facility])    • Degeneration of lumbar or lumbosacral intervertebral disc 2017   • Emphysema lung (CMS/[facility])    • Hypertension    • Low back pain    • Nausea    • Rectal prolapse    • Tremors of nervous system      Past Surgical History:   Procedure Laterality Date   • BACK SURGERY      4 times lspine    • CATARACT EXTRACTION Bilateral    • COLONOSCOPY  2011   • FEMUR PERIPROSTHETIC FRACTURE REPAIR Right 2018    Procedure: RIGHT FEMUR PERIPROSTHETIC FRACTURE REPAIR OPEN REDUCTION INTERNAL FIXATION ;  Surgeon: Nima Rausch MD;  Location: [facility] OR;  Service:    • JOINT REPLACEMENT      right hip      General Information     Row Name 21 1409          Physical Therapy Time and Intention    Document Type  evaluation  -     Mode of Treatment  individual therapy;physical therapy  -     Row Name 21 1409          General Information    Prior Level of Function  independent:;gait;transfer;bed mobility pt reports she walks with walker normally  -     Existing  Precautions/Restrictions  fall pt is covid positive  -     Barriers to Rehab  medically complex;cognitive status  -     Row Name 01/04/21 1409          Living Environment    Lives With  facility resident  -     Row Name 01/04/21 1409          Cognition    Orientation Status (Cognition)  oriented to;person  -     Row Name 01/04/21 1409          Safety Issues, Functional Mobility    Safety Issues Affecting Function (Mobility)  ability to follow commands pt is slow to process per RN, Pt given extra time to complete tasks and repeated cues provided, pt also seems [place]  -     Impairments Affecting Function (Mobility)  balance;cognition;coordination;endurance/activity tolerance;postural/trunk control;strength;shortness of breath  -       User Key  (r) = Recorded By, (t) = Taken By, (c) = Cosigned By    Initials Name Provider Type     Alley Rowe, PT Physical Therapist        Mobility     Row Name 01/04/21 1413          Bed Mobility    Bed Mobility  supine-sit;sit-supine  -     Supine-Sit Protection (Bed Mobility)  verbal cues;nonverbal cues (demo/gesture);minimum assist (75% patient effort)  -     Sit-Supine Protection (Bed Mobility)  verbal cues;nonverbal cues (demo/gesture);moderate assist (50% patient effort)  -     Assistive Device (Bed Mobility)  bed rails;head of bed elevated  -     Row Name 01/04/21 1413          Transfers    Comment (Transfers)  Pt performed sit to stand x4, pt with strong posterior lean on each attempt, pt's feet were blocked to avoid sliding, PT provided cues to put more weight through forefoot and to lean foward  -     Row Name 01/04/21 1413          Sit-Stand Transfer    Sit-Stand Protection (Transfers)  verbal cues;nonverbal cues (demo/gesture);moderate assist (50% patient effort)  -     Assistive Device (Sit-Stand Transfers)  walker, front-wheeled  -     Row Name 01/04/21 1413          Gait/Stairs (Locomotion)    Comment (Gait/Stairs)  Pt given  multiple cues and time to attempt taking steps but pt unable to weight shift to advance feet.  -       User Key  (r) = Recorded By, (t) = Taken By, (c) = Cosigned By    Initials Name Provider Type     Alley Rowe PT Physical Therapist        Obj/Interventions     Row Name 01/04/21 1416          Range of Motion Comprehensive    General Range of Motion  no range of motion deficits identified  -     Row Name 01/04/21 1416          Strength Comprehensive (MMT)    Comment, General Manual Muscle Testing (MMT) Assessment  generalized weakness noted with functional mobility, B LE grossly >/=3/5  -     Row Name 01/04/21 1416          Balance    Balance Assessment  standing static balance;sitting dynamic balance  -     Dynamic Sitting Balance  mild impairment  -     Static Standing Balance  severe impairment  -       User Key  (r) = Recorded By, (t) = Taken By, (c) = Cosigned By    Initials Name Provider Type     Alley Rowe, PT Physical Therapist        Goals/Plan     Row Name 01/04/21 1420          Bed Mobility Goal 1 (PT)    Activity/Assistive Device (Bed Mobility Goal 1, PT)  bed mobility activities, all  -CH     [place] Level/Cues Needed (Bed Mobility Goal 1, PT)  supervision required  -     Time Frame (Bed Mobility Goal 1, PT)  1 week  -     Row Name 01/04/21 1420          Transfer Goal 1 (PT)    Activity/Assistive Device (Transfer Goal 1, PT)  transfers, all;walker, rolling  -CH     [place] Level/Cues Needed (Transfer Goal 1, PT)  supervision required  -     Time Frame (Transfer Goal 1, PT)  1 week  -     Row Name 01/04/21 1420          Gait Training Goal 1 (PT)    Activity/Assistive Device (Gait Training Goal 1, PT)  gait (walking locomotion);walker, rolling  -CH     [place] Level (Gait Training Goal 1, PT)  supervision required  -     Distance (Gait Training Goal 1, PT)  50  -CH     Time Frame (Gait Training Goal 1, PT)  1 week  -       User Key  (r) =  Recorded By, (t) = Taken By, (c) = Cosigned By    Initials Name Provider Type     Alley Rowe, PT Physical Therapist        Clinical Impression     Row Name 01/04/21 1417          Pain    Additional Documentation  Pain Scale: FACES Pre/Post-Treatment (Group)  -     Row Name 01/04/21 1417          Pain Scale: FACES Pre/Post-Treatment    Pain: FACES Scale, Pretreatment  0-->no hurt  -     Posttreatment Pain Rating  0-->no hurt  -     Row Name 01/04/21 1417          Plan of Care Review    Plan of Care Reviewed With  patient  -     Outcome Summary  Pt is a 86 yo F who was admitted from AL with falls, UTI, and found to be COVID 19 +. Pt presents to PT with impaired functional mobility and gait secondary to generalized weakness, impaired balance, and decreased activity tolerance. Pt with SHARPE and audible wheezing during activity. O2 sats remained at 91 to 92%. PT may benefit from skilled PT to address strength, mobility, and gait.  -     Row Name 01/04/21 1417          Therapy Assessment/Plan (PT)    Patient/Family Therapy Goals Statement (PT)  Pt unable to state a goal at this time  -     Rehab Potential (PT)  good, to achieve stated therapy goals  -     Criteria for Skilled Interventions Met (PT)  skilled treatment is necessary  -     Row Name 01/04/21 1417          Positioning and Restraints    Pre-Treatment Position  in bed  -     Post Treatment Position  bed  -CH     In Bed  supine;call light within reach;encouraged to call for assist;exit alarm on;notified [facility]  -       User Key  (r) = Recorded By, (t) = Taken By, (c) = Cosigned By    Initials Name Provider Type     Alley Rowe, PT Physical Therapist        Outcome Measures     Row Name 01/04/21 1421          How much help from another person do you currently need...    Turning from your back to your side while in flat bed without using bedrails?  3  -CH     Moving from lying on back to sitting on the side of a flat bed without  bedrails?  3  -CH     Moving to and from a bed to a chair (including a wheelchair)?  2  -CH     Standing up from a chair using your arms (e.g., wheelchair, bedside chair)?  2  -CH     Climbing 3-5 steps with a railing?  1  -CH     To walk in hospital room?  1  -CH     AM-PAC 6 Clicks Score (PT)  12  -     Row Name 01/04/21 1421          Functional Assessment    Outcome Measure Options  AM-PAC 6 Clicks Basic Mobility (PT)  -       User Key  (r) = Recorded By, (t) = Taken By, (c) = Cosigned By    Initials Name Provider Type     Alley Rowe, PT Physical Therapist        Physical Therapy Education                 Title: PT OT SLP Therapies (In Progress)     Topic: Physical Therapy (In Progress)     Point: Mobility training (Done)     Learning Progress Summary           Patient Acceptance, E,TB,D, VU,NR by  at 1/4/2021 1422                   Point: Home exercise program (Not Started)     Learner Progress:  Not documented in this visit.          Point: Body mechanics (Done)     Learning Progress Summary           Patient Acceptance, E,TB,D, VU,NR by  at 1/4/2021 1422                   Point: Precautions (Done)     Learning Progress Summary           Patient Acceptance, E,TB,D, VU,NR by  at 1/4/2021 1422                               User Key     Initials Effective Dates Name Provider Type [facility] 04/03/18 -  Alley Rowe, AMELIA Physical Therapist PT              PT Recommendation and Plan  Planned Therapy Interventions (PT): balance training, bed mobility training, gait training, home exercise program, patient/family education, strengthening, transfer training  Plan of Care Reviewed With: patient  Outcome Summary: Pt is a 86 yo F who was admitted from AL with falls, UTI, and found to be COVID 19 +. Pt presents to PT with impaired functional mobility and gait secondary to generalized weakness, impaired balance, and decreased activity tolerance. Pt with SHARPE and audible wheezing during  activity. O2 sats remained at 91 to 92%. PT may benefit from skilled PT to address strength, mobility, and gait.     Time Calculation:   PT Charges     Row Name 01/04/21 1422             Time Calculation    Start Time  1327  -      Stop Time  1357  -      Time Calculation (min)  30 min  -      PT Received On  01/04/21  -      PT - Next Appointment  01/05/21  -      PT Goal Re-Cert Due Date  01/11/21  -         Time Calculation- PT    Total Timed Code Minutes- PT  23 minute(s)  -        User Key  (r) = Recorded By, (t) = Taken By, (c) = Cosigned By    Initials Name Provider Type     Alley Rowe, PT Physical Therapist        Therapy Charges for Today     Code Description Service Date Service Provider Modifiers Qty    71557336374  PT EVAL MOD COMPLEXITY 2 1/4/2021 Alley Rowe, PT GP 1    [id number]  PT THERAPEUTIC ACT EA 15 MIN 1/4/2021 Alley Rowe, PT GP 1    17153646918 HC PT THER PROC EA 15 MIN 1/4/2021 Alley Rowe, PT GP 1          PT G-Codes  Outcome Measure Options: AM-PAC 6 Clicks Basic Mobility (PT)  AM-PAC 6 Clicks Score (PT): 12    Alley Rowe PT  1/4/2021

## 2021-01-04 NOTE — NURSING NOTE
CWOCN consult- reviewed chart and photos. Patient was admitted with pink, nonblanching skin and an area of open tissue- partial thickness on her lower back. Staff has been using barrier ointment and silicone border dressings to protect this skin. This is appropriate treatment to protect the skin from pressure, friction, and moisture. Recommend to continue. Patient should be turned and have an accumax pump or waffle mattress on her bed. Will update care plan and recommendations.

## 2021-01-04 NOTE — PROGRESS NOTES
Continued Stay Note  [facility]     Patient Name: Alona Cisneros  MRN: [id number]  Today's Date: 1/4/2021    Admit Date: 1/1/2021    Discharge Plan     Row Name 01/04/21 1438       Plan    Plan  Covid SNF unit vs Home with family for 12 days to then return back to [facility]    Patient/Family in Agreement with Plan  yes    Plan Comments  CCP spoke with [facility]  pt unable to return back to [facility] or their SNF rehab due to positive covid19 test. CCP called pts daughter Paula via outbound call and had 3 way call with her sister Josette and brother Danielito. Explained that [facility]  unable to accept and discussed COVID19 SNF unit vs going home with family for 12 days then returning to [facility]. CCP reviewed SNF unit's and they would like referrals to [facility] and [facility]. They have questions for [facility] and CCP provided them [facility]'s contact info. CCP update Mandi/Hospitalist Coordinator for Dr. Siegel. CCP made referrals to [facility] and [facility]. Zari ROCA/CCP    Row Name 01/04/21 1314       Plan    Plan  -    Plan Comments  Called [facility] and left  requesting update. Zari ROCA/CCP    Row Name 01/04/21 1204       Plan    Plan  Return back to [facility] - awaiting confirmation from [facility]    Provided Post Acute Provider List?  Yes    Post Acute Provider List  Nursing Home    Method of Delivery  Telephone    Patient/Family in Agreement with Plan  yes    Plan Comments  CCP spoke with daughter Jannette Mon [phone] via outbound call, introduced self and explained CCP role. Verified facesheet and confirmed pt is a resident of AL at [facility]. Pt uses a walker for mobility, also has a nebulizer. Staff there assist with medication dispensing. Pt has been to [facility] in the past. CCP explained would speak with [facility] and  determine if pt can return back, as pt recent had vaccine. Discussed Covid19 SNF facilities and daughter would prefer to [facility] if SNF needed. She would like pt to go back to her AL due to her Dementia. CCP reviewed transportation options and explained hospital unable to guarantee insurance payment for ambulance use. CCP reviewed wc transport or private car transport. She verbalized understanding. Yamilex ROCA/CCP        Discharge Codes    No documentation.       Expected Discharge Date and Time     Expected Discharge Date Expected Discharge Time    Jan 4, 2021             Harleen Simmons, RN

## 2021-01-04 NOTE — DISCHARGE SUMMARY
[facility]    Date of Discharge:  1/4/2021    PCP: Sapphire Mckeon MD    Discharge Diagnosis:   Active Hospital Problems    Diagnosis  POA   • **Acute UTI [N39.0]  Yes   • Dementia (CMS/[facility]) [F03.90]  Unknown   • COPD (chronic obstructive pulmonary disease) (CMS/[facility]) [J44.9]  Unknown   • COVID-19 [U07.1]  Unknown   • Hypertension [I10]  Yes      Resolved Hospital Problems   No resolved problems to display.          Consults     Date and Time Order Name Status Description    1/2/2021 0134 [facility] (on-call MD unless specified) Details Completed         Hospital Course  Please see history and physical for details. Patient is a 85 y.o. female initially admitted for a fall and complains of shortness of breath.  Since being here at the hospital she has had absolutely no complaints of shortness of breath.  Her oxygenation is completely stable on room air and there has been no need for supplemental oxygen.  She was found to have a Covid positive result which is an incidental finding and I am curious if it has any correlation to recent initial portion of her vaccination but she is otherwise asymptomatic.  She was found to have a urinary tract infection was treated accordingly with Rocephin x3 days.  She can resume her probiotic to avoid any issues with GI aversion.  She did have a fall but no acute disease was noted on CT of the head.  I think her main issue is the fact that she has dementia that is becoming nearly terminal without any aspects of behavior disturbance.  Her hypertension is remained stable and her leukocytopenia was secondary to viral illness.  I stay in close contact with the daughter over the phone at [phone].  She is very well versed in regards to this patient's past medical history as well as very good it being a patient advocate on behalf of this patient.  Patient normally lives at [facility] and case was discussed in  multidisciplinary rounds as we are trying to prep for disposition.  PT is set to evaluate and ultimately disposition will just hinge upon her Covid results on whether or not her assisted living will take her back for whether or not she will need to transition to subacute rehab temporarily.  Regardless either way I think she is more than medically stable for discharge.  If we can coordinate all of her discharge needs today then myself as well as the daughter are amenable to the above plan and if for some reason we are unable to get certification today and logistically get this coordinated then I do not think there is any harm to hold her overnight and try for tomorrow.    Condition on Discharge: Improved.     Temp:  [97.9 °F (36.6 °C)-98.6 °F (37 °C)] 98.6 °F (37 °C)  Heart Rate:  [] 93  Resp:  [18-20] 18  BP: (110-145)/(78-92) 110/92  Body mass index is 17.96 kg/m².    Physical Exam  HENT:      Head: Normocephalic.      Nose: Nose normal.      Mouth/Throat:      Mouth: Mucous membranes are moist.      Pharynx: Oropharynx is clear.   Eyes:      General: No scleral icterus.     Conjunctiva/sclera: Conjunctivae normal.      Pupils: Pupils are equal, round, and reactive to light.   Cardiovascular:      Rate and Rhythm: Normal rate and regular rhythm.   Pulmonary:      Effort: Pulmonary effort is normal. No respiratory distress.      Breath sounds: Normal breath sounds.   Abdominal:      General: Bowel sounds are normal. There is no distension.      Palpations: Abdomen is soft.      Tenderness: There is no abdominal tenderness.   Musculoskeletal:         General: No swelling.   Skin:     General: Skin is warm and dry.   Neurological:      General: No focal deficit present.      Mental Status: She is alert.     [unfilled]    Disposition: Skilled Nursing Facility (DC - External)       Discharge Medications      Continue These Medications      Instructions Start Date   albuterol 1.25 MG/3ML nebulizer solution  Commonly  known as: ACCUNEB   No dose, route, or frequency recorded.      amLODIPine 2.5 MG tablet  Commonly known as: NORVASC   2.5 mg, Oral, Daily, 1/2 tab in am      Brovana 15 MCG/2ML nebulizer solution  Generic drug: arformoterol   15 mcg, 2 Times Daily - RT      budesonide 0.5 MG/2ML nebulizer solution  Commonly known as: PULMICORT   USE 1 VIAL IN NEBULIZER TWICE DAILY      citalopram 20 MG tablet  Commonly known as: CeleXA   20 mg, Oral, Daily      donepezil 5 MG tablet  Commonly known as: ARICEPT   10 mg, Oral, Nightly      mirtazapine 7.5 MG half tablet  Commonly known as: REMERON   7.5 mg, Oral, Nightly      MV-ONE PO   1 tablet, Oral, Daily      primidone 50 MG tablet  Commonly known as: MYSOLINE   150 mg, Oral, Every Morning      Probiotic-10 chewable tablet   1 tablet, Oral              Additional Instructions for the Follow-ups that You Need to Schedule     Discharge Follow-up with PCP   As directed       Currently Documented PCP:    Sapphire Mckeon MD    PCP Phone Number:    [phone]     Follow Up Details: pcp 1-2 weeks           Follow-up Information     Sapphire Mckeon MD .    Specialty: Geriatric Medicine  Why: pcp 1-2 weeks  Contact information:  [address] [place] [address] [address]ATION  [address] [place] [id number] [phone]                     Taiwo Siegel MD  01/04/21  11:22 EST    Discharge time spent greater than 30 minutes.

## 2021-01-04 NOTE — PROGRESS NOTES
Discharge Planning Assessment  [facility]     Patient Name: Alona Cisneros  MRN: [id number]  Today's Date: 1/4/2021    Admit Date: 1/1/2021    Discharge Needs Assessment     Row Name 01/04/21 1208       Living Environment    Lives With  facility resident    Name(s) of Who Lives With Patient  LIVES AT [facility]    Current Living Arrangements  independent/assisted living facility    Primary Care Provided by  self;homecare agency    Provides Primary Care For  no one, unable/limited ability to care for self    Family Caregiver if Needed  child(jewell), adult    Quality of Family Relationships  helpful;involved;supportive    Able to Return to Prior Arrangements  other (see comments)       Resource/Environmental Concerns    Resource/Environmental Concerns  other (see comments)    Transportation Concerns  car, none       Transition Planning    Patient/Family Anticipates Transition to  home with help/services;inpatient rehabilitation facility    Patient/Family Anticipated Services at Transition  rehabilitation services    Transportation Anticipated  family or friend will provide       Discharge Needs Assessment    Readmission Within the Last 30 Days  no previous admission in last 30 days    Equipment Currently Used at Home  walker, rolling;nebulizer    Concerns to be Addressed  adjustment to diagnosis/illness;care coordination/care conferences;discharge planning;decision-making    Anticipated Changes Related to Illness  none    Current Discharge Risk  chronically ill;cognitively impaired        Discharge Plan     Row Name 01/04/21 1204       Plan    Plan  Return back to [facility] - awaiting confirmation from [facility]    Provided Post Acute Provider List?  Yes    Post Acute Provider List  Nursing Home    Method of Delivery  Telephone    Patient/Family in Agreement with Plan  yes    Plan Comments  CCP spoke with daughter Jannette Mon [phone] via outbound call, introduced self and explained CCP role. Verified  facesheet and confirmed pt is a resident of AL at [facility]. Pt uses a walker for mobility, also has a nebulizer. Staff there assist with medication dispensing. Pt has been to [facility] in the past. CCP explained would speak with [facility] and determine if pt can return back, as pt recent had vaccine. Discussed Covid19 SNF facilities and daughter would prefer to [facility] if SNF needed. She would like pt to go back to her AL due to her Dementia. CCP reviewed transportation options and explained hospital unable to guarantee insurance payment for ambulance use. CCP reviewed wc transport or private car transport. She verbalized understanding. Yamilex RN/CCP        Continued Care and Services - Admitted Since 1/1/2021     Destination     Service Provider Request Status Selected Services Address Phone Fax Patient Preferred    [facility]  Pending - Request Sent N/A [address] [facility] [id number] [phone] --              Expected Discharge Date and Time     Expected Discharge Date Expected Discharge Time    Jan 4, 2021         Demographic Summary     Row Name 01/04/21 1208       General Information    Admission Type  inpatient    Referral Source  admission list    Reason for Consult  discharge planning    Preferred Language  English     Used During This Interaction  no       Contact Information    Permission Granted to Share Info With  facility ;family/designee        Functional Status    No documentation.       Psychosocial    No documentation.       Abuse/Neglect    No documentation.       Legal    No documentation.       Substance Abuse    No documentation.       Patient Forms    No documentation.           Harleen Simmons RN

## 2021-01-05 VITALS
RESPIRATION RATE: 16 BRPM | WEIGHT: 111.3 LBS | TEMPERATURE: 98.7 F | HEIGHT: 66 IN | DIASTOLIC BLOOD PRESSURE: 94 MMHG | HEART RATE: 98 BPM | BODY MASS INDEX: 17.89 KG/M2 | OXYGEN SATURATION: 94 % | SYSTOLIC BLOOD PRESSURE: 146 MMHG

## 2021-01-05 LAB — QT INTERVAL: 322 MS

## 2021-01-05 PROCEDURE — 93010 ELECTROCARDIOGRAM REPORT: CPT | Performed by: INTERNAL MEDICINE

## 2021-01-05 PROCEDURE — 97110 THERAPEUTIC EXERCISES: CPT

## 2021-01-05 PROCEDURE — 93005 ELECTROCARDIOGRAM TRACING: CPT | Performed by: INTERNAL MEDICINE

## 2021-01-05 PROCEDURE — 97530 THERAPEUTIC ACTIVITIES: CPT

## 2021-01-05 RX ADMIN — AMLODIPINE BESYLATE 2.5 MG: 2.5 TABLET ORAL at 11:47

## 2021-01-05 RX ADMIN — Medication 250 MG: at 11:47

## 2021-01-05 RX ADMIN — CITALOPRAM 20 MG: 20 TABLET, FILM COATED ORAL at 11:47

## 2021-01-05 NOTE — PLAN OF CARE
Goal Outcome Evaluation:  Plan of Care Reviewed With: patient  Progress: improving  Outcome Summary: Pt required increased assistance for bed mobility today and became resistant during supine ther ex. She sat EOB about 3 mins with min A for sitting balance before requesting to return to supine. Will continue to follow and progress as tolerated.    Patient was not wearing a face mask during this therapy encounter. Therapist used appropriate personal protective equipment including gown, eye protection, mask and gloves.  Mask used was standard procedure mask. Appropriate PPE was worn during the entire therapy session. Hand hygiene was completed before and after therapy session. Patient is in enhanced droplet precautions.

## 2021-01-05 NOTE — NURSING NOTE
Notified daughter, Josette, of patient transfer to [facility] at 1700.

## 2021-01-05 NOTE — PLAN OF CARE
Goal Outcome Evaluation:  Plan of Care Reviewed With: patient  Progress: improving  Outcome Summary: Pt pleasant and cooperative with care. VSS with no acute changes this shift.

## 2021-01-05 NOTE — PROGRESS NOTES
Continued Stay Note  [facility]     Patient Name: Alona Cisneros  MRN: [id number]  Today's Date: 1/5/2021    Admit Date: 1/1/2021    Discharge Plan     Row Name 01/05/21 1034       Plan    Plan  [facility]    Patient/Family in Agreement with Plan  yes    Plan Comments  CCP spoke with [facility], no beds available today and do not anticipate any availablity this week. CCP spoke with Ashleigh/Kristan pt accepted and bed available today. CCP called daughter Paula and left  requesting call back. CCP Recieved call back from Paula provided update and explained had asked [facility] to reach out to answer any of her questions. Packet in [place]. Zari ROCA/CCP        Discharge Codes    No documentation.       Expected Discharge Date and Time     Expected Discharge Date Expected Discharge Time    Jan 4, 2021             Harleen Simmons RN

## 2021-01-05 NOTE — PROGRESS NOTES
"Physicians Statement of Medical Necessity for  Ambulance Transportation    GENERAL INFORMATION     Name: Alona Cisneros  YOB: 1935  Medicare #: [id number] and [employer] BLUE CROSS # [id number]  Transport Date: 1/5/2021  (Valid for round trips this date, or for scheduled repetitive trips for 60 days from the date signed below.)  Origin: [facility]   [facility]  Is the Patient's stay covered under Medicare Part A (PPS/DRG?)Yes  Closest appropriate facility? Yes  If this a hosp-hosp transfer? No  Is this a hospice patient? No    MEDICAL NECESSITY QUESTIONAIRE    Ambulance Transportation is medically necessary only if other means of transportation are contraindicated or would be potentially harmful to the patient.  To meet this requirement, the patient must be either \"bed confined\" or suffer from a condition such that transport by means other than an ambulance is contraindicated by the patient's condition.  The following questions must be answered by the healthcare professional signing below for this form to be valid:     1) Describe the MEDICAL CONDITION (physical and/or mental) of this patient AT THE TIME OF AMBULANCE TRANSPORT that requires the patient to be transported in an ambulance, and why transport by other means is contraindicated by the patient's condition: COVID19, ACUTE UTI, DEMENTIA, COPD AND HYPERTENSION  Past Medical History:   Diagnosis Date   • Anxiety    • COPD (chronic obstructive pulmonary disease) (CMS/HCC)    • Degeneration of lumbar or lumbosacral intervertebral disc 6/1/2017   • Emphysema lung (CMS/HCC)    • Hypertension    • Low back pain    • Nausea    • Rectal prolapse    • Tremors of nervous system       Past Surgical History:   Procedure Laterality Date   • BACK SURGERY      4 times lspine    • CATARACT EXTRACTION Bilateral    • COLONOSCOPY  12/28/2011   • FEMUR PERIPROSTHETIC FRACTURE REPAIR Right 2/18/2018    " "Procedure: RIGHT FEMUR PERIPROSTHETIC FRACTURE REPAIR OPEN REDUCTION INTERNAL FIXATION ;  Surgeon: Nima Rausch MD;  Location: [facility] OR;  Service:    • JOINT REPLACEMENT      right hip       2) Is this patient \"bed confined\" as defined below?Yes   To be \"bed confined\" the patient must satisfy all three of the following criteria:  (1) unable to get up from bed without assistance; AND (2) unable to ambulate;  AND (3) unable to sit in a chair or wheelchair.  3) Can this patient safely be transported by car or wheelchair van (I.e., may safely sit during transport, without an attendant or monitoring?)No   4. In addition to completing questions 1-3 above, please check any of the following conditions that apply*:          *Note: supporting documentation for any boxes checked must be maintained in the patient's medical records Patient is confused, Medical attendant required, Requires oxygen - unable to self administer, Special handling/isolation/infection control precautions required, Unable to tolerate seated position for time needed to transport and Other GENERALIZED WEAKNESS, UNABLE TO STAND, FALLS RISK, DEMENTIA      SIGNATURE OF PHYSICIAN OR OTHER AUTHORIZED HEALTHCARE PROFESSIONAL    I certify that the above information is true and correct based on my evaluation of this patient, and represent that the patient requires transport by ambulance and that other forms of transport are contraindicated.  I understand that this information will be used by the Centers for Medicare and Medicaid Services (CMS) to support the determiniation of medical necessity for ambulance services, and I represent that I have personal knowledge of the patient's condition at the time of transport.    X   If this box is checked, I also certify that the patient is physically or mentally incapable of signing the ambulance service's claim form and that the institution with which I am affiliated has furnished care, services or assistance to the " patient.  My signature below is made on behalf of the patient pursuant to 42 .36(b)(4). In accordance with 42 .37, the specific reason(s) that the patient is physically or mentally incapable of signing the claim for is as follows: DEMENTIA    Signature of Physician or Healthcare Professional  Date/Time:        (For Scheduled repetitive transport, this form is not valid for transports performed more than 60 days after this date).                                                                                                                                            --------------------------------------------------------------------------------------------  Printed Name and Credentials of Physician or Authorized Healthcare Professional     *Form must be signed by patient's attending physician for scheduled, repetitive transports,.  For non-repetitive ambulance transports, if unable to obtain the signature of the attending physician, any of the following may sign (please select below):     Physician  Clinical Nurse Specialist  Registered Nurse     Physician Assistant  Discharge Planner  Licensed Practical Nurse     Nurse Practitioner

## 2021-01-05 NOTE — THERAPY TREATMENT NOTE
Patient Name: Alona Cisneros  : 1935    MRN: [id number]                              Today's Date: 2021       Admit Date: 2021    Visit Dx:     ICD-10-CM ICD-9-CM   1. Acute UTI  N39.0 599.0   2. Frequent falls  R29.6 V15.88     Patient Active Problem List   Diagnosis   • Degeneration of lumbar or lumbosacral intervertebral disc   • Lumbar neuritis   • Closed displaced oblique fracture of shaft of right femur (CMS/[facility])   • Fall from standing   • Anxiety   • Hypertension   • Tremors of nervous system   • Underweight BMI 18.1   • Osteoporosis with pathological fracture   • Alcohol withdrawal (CMS/[facility])   • History of ORIF-RIGHT FEMUR PERIPROSTHETIC FRACTURE REPAIR OPEN REDUCTION INTERNAL FIXATION - Right   • Acute UTI   • Dementia (CMS/[facility])   • COPD (chronic obstructive pulmonary disease) (CMS/[facility])   • COVID-19     Past Medical History:   Diagnosis Date   • Anxiety    • COPD (chronic obstructive pulmonary disease) (CMS/[facility])    • Degeneration of lumbar or lumbosacral intervertebral disc 2017   • Emphysema lung (CMS/[facility])    • Hypertension    • Low back pain    • Nausea    • Rectal prolapse    • Tremors of nervous system      Past Surgical History:   Procedure Laterality Date   • BACK SURGERY      4 times lspine    • CATARACT EXTRACTION Bilateral    • COLONOSCOPY  2011   • FEMUR PERIPROSTHETIC FRACTURE REPAIR Right 2018    Procedure: RIGHT FEMUR PERIPROSTHETIC FRACTURE REPAIR OPEN REDUCTION INTERNAL FIXATION ;  Surgeon: Nima Rausch MD;  Location: [facility];  Service:    • JOINT REPLACEMENT      right hip      General Information     Row Name 21 1146          Physical Therapy Time and Intention    Document Type  therapy note (daily note)  -CF     Mode of Treatment  physical therapy  -CF     Row Name 21 1146          General Information    Patient Profile Reviewed  yes  -CF     Existing Precautions/Restrictions  fall  -CF     Barriers to Rehab  medically complex;cognitive  status  -CF     Row Name 01/05/21 1146          Cognition    Orientation Status (Cognition)  oriented to;person  -CF     Row Name 01/05/21 1146          Safety Issues, Functional Mobility    Safety Issues Affecting Function (Mobility)  ability to follow commands pt with decreased response to questions - appears to be in pain throughout. Only verbalized not wanting to stand when sitting EOB  -CF     Impairments Affecting Function (Mobility)  balance;cognition;coordination;endurance/activity tolerance;postural/trunk control;strength;shortness of breath  -CF       User Key  (r) = Recorded By, (t) = Taken By, (c) = Cosigned By    Initials Name Provider Type    CF Aylin Corral PT Physical Therapist        Mobility     Row Name 01/05/21 1149          Bed Mobility    Bed Mobility  supine-sit;sit-supine;scooting/bridging  -CF     Scooting/Bridging [place] (Bed Mobility)  dependent (less than 25% patient effort);1 person assist  -CF     Supine-Sit [place] (Bed Mobility)  moderate assist (50% patient effort);1 person assist;verbal cues;nonverbal cues (demo/gesture)  -CF     Sit-Supine [place] (Bed Mobility)  maximum assist (25% patient effort);1 person assist;verbal cues  -CF     Assistive Device (Bed Mobility)  bed rails;head of bed elevated  -     Row Name 01/05/21 1149          Transfers    Comment (Transfers)  Pt required more assist for bed mobility today and declined standing. sat EOB ~ 3 mins before requesting to lay back down  -     Row Name 01/05/21 1149          Sit-Stand Transfer    Sit-Stand [place] (Transfers)  not tested  -CF       User Key  (r) = Recorded By, (t) = Taken By, (c) = Cosigned By    Initials Name Provider Type     Aylin Corral PT Physical Therapist        Obj/Interventions     Row Name 01/05/21 1150          Motor Skills    Therapeutic Exercise  other (see comments) AAROM B hip abd/add, heel slides x10. pt resisting any other ther ex  -CF       User Key  (r) =  Recorded By, (t) = Taken By, (c) = Cosigned By    Initials Name Provider Type    CF Aylin Corral, AMELIA Physical Therapist        Goals/Plan    No documentation.       Clinical Impression     Row Name 01/05/21 1150          Pain    Additional Documentation  Pain Scale: FACES Pre/Post-Treatment (Group)  -CF     Row Name 01/05/21 1150          Pain Scale: FACES Pre/Post-Treatment    Pain: FACES Scale, Pretreatment  2-->hurts little bit  -CF     Posttreatment Pain Rating  4-->hurts little more  -CF     Pre/Posttreatment Pain Comment  demonstrates pain via facial expression but does not provide location of pain  -CF     Row Name 01/05/21 1150          Plan of Care Review    Plan of Care Reviewed With  patient  -CF     Outcome Summary  Pt required increased assistance for bed mobility today and became resistant during supine ther ex. She sat EOB about 3 mins with min A for sitting balance before requesting to return to supine. Will continue to follow and progress as tolerated.  -CF     Row Name 01/05/21 1150          Vital Signs    Pre SpO2 (%)  94  -CF     O2 Delivery Pre Treatment  room air  -CF     Intra SpO2 (%)  88 lowest observed when sitting EOB  -CF     O2 Delivery Intra Treatment  room air  -CF     Post SpO2 (%)  92  -CF     O2 Delivery Post Treatment  room air  -CF     Row Name 01/05/21 1150          Positioning and Restraints    Pre-Treatment Position  in bed  -CF     Post Treatment Position  bed  -CF     In Bed  supine;exit alarm on;encouraged to call for assist;call light within reach;SCD pump applied  -CF       User Key  (r) = Recorded By, (t) = Taken By, (c) = Cosigned By    Initials Name Provider Type    CF Aylin Corral PT Physical Therapist        Outcome Measures     Row Name 01/05/21 1153          How much help from another person do you currently need...    Turning from your back to your side while in flat bed without using bedrails?  3  -CF     Moving from lying on back to sitting on the side  of a flat bed without bedrails?  2  -CF     Moving to and from a bed to a chair (including a wheelchair)?  2  -CF     Standing up from a chair using your arms (e.g., wheelchair, bedside chair)?  1  -CF     Climbing 3-5 steps with a railing?  1  -CF     To walk in hospital room?  1  -CF     AM-PAC 6 Clicks Score (PT)  10  -CF     Row Name 01/05/21 1153          Functional Assessment    Outcome Measure Options  AM-PAC 6 Clicks Basic Mobility (PT)  -CF       User Key  (r) = Recorded By, (t) = Taken By, (c) = Cosigned By    Initials Name Provider Type     Aylin Corral, PT Physical Therapist        Physical Therapy Education                 Title: PT OT SLP Therapies (In Progress)     Topic: Physical Therapy (In Progress)     Point: Mobility training (In Progress)     Learning Progress Summary           Patient Acceptance, E, NR by  at 1/5/2021 1153    Acceptance, E,TB,D, VU,NR by  at 1/4/2021 1422                   Point: Home exercise program (In Progress)     Learning Progress Summary           Patient Acceptance, E, NR by  at 1/5/2021 1153                   Point: Body mechanics (In Progress)     Learning Progress Summary           Patient Acceptance, E, NR by  at 1/5/2021 1153    Acceptance, E,TB,D, VU,NR by  at 1/4/2021 1422                   Point: Precautions (In Progress)     Learning Progress Summary           Patient Acceptance, E, NR by  at 1/5/2021 1153    Acceptance, E,TB,D, VU,NR by  at 1/4/2021 1422                               User Key     Initials Effective Dates Name Provider Type Discipline     04/03/18 -  Alley Rowe, PT Physical Therapist PT     09/02/20 -  Aylin Corral, AMELIA Physical Therapist PT              PT Recommendation and Plan     Plan of Care Reviewed With: patient  Outcome Summary: Pt required increased assistance for bed mobility today and became resistant during supine ther ex. She sat EOB about 3 mins with min A for sitting balance before requesting  to return to supine. Will continue to follow and progress as tolerated.     Time Calculation:   PT Charges     Row Name 01/05/21 1145             Time Calculation    Start Time  0951  -CF      Stop Time  1014  -CF      Time Calculation (min)  23 min  -CF      PT Received On  01/05/21  -CF      PT - Next Appointment  01/06/21  -CF        User Key  (r) = Recorded By, (t) = Taken By, (c) = Cosigned By    Initials Name Provider Type    CF Aylin Corral, PT Physical Therapist        Therapy Charges for Today     Code Description Service Date Service Provider Modifiers Qty    11764819086  PT THER PROC EA 15 MIN 1/5/2021 Aylin Corral, PT GP 1    30820891203 HC PT THERAPEUTIC ACT EA 15 MIN 1/5/2021 Aylin Corral, PT GP 1          PT G-Codes  Outcome Measure Options: AM-PAC 6 Clicks Basic Mobility (PT)  AM-PAC 6 Clicks Score (PT): 10    Aylin Corral PT  1/5/2021

## 2021-01-05 NOTE — DISCHARGE SUMMARY
[facility]    Date of Discharge:  1/5/2021    PCP: Sapphire Mckeon MD    Discharge Diagnosis:   Active Hospital Problems    Diagnosis  POA   • **Acute UTI [N39.0]  Yes   • Dementia (CMS/[facility]) [F03.90]  Unknown   • COPD (chronic obstructive pulmonary disease) (CMS/[facility]) [J44.9]  Unknown   • COVID-19 [U07.1]  Unknown   • Hypertension [I10]  Yes      Resolved Hospital Problems   No resolved problems to display.          Consults     Date and Time Order Name Status Description    1/2/2021 0134 [facility] (on-call MD unless specified) Details Completed         Hospital Course  Please see history and physical for details. Patient is a 85 y.o. female  initially admitted for a fall and complains of shortness of breath.  Since being here at the hospital she has had absolutely no complaints of shortness of breath.  Her oxygenation is completely stable on room air and there has been no need for supplemental oxygen.  She was found to have a Covid positive result which is an incidental finding and I am curious if it has any correlation to recent initial portion of her vaccination but she is otherwise asymptomatic.  She was found to have a urinary tract infection was treated accordingly with Rocephin x3 days.  She can resume her probiotic to avoid any issues with GI aversion.  She did have a fall but no acute disease was noted on CT of the head.  I think her main issue is the fact that she has dementia that is becoming nearly terminal without any aspects of behavior disturbance.  Her hypertension is remained stable and her leukocytopenia was secondary to viral illness.  I stay in close contact with the daughter over the phone at [phone].  She is very well versed in regards to this patient's past medical history as well as very good it being a patient advocate on behalf of this patient.  Patient normally lives at the [facility] [place] and case was discussed in  multidisciplinary rounds as we are trying to prep for disposition.  PT is set to evaluate and ultimately disposition will just hinge upon her Covid results on whether or not her assisted living will take her back for whether or not she will need to transition to subacute rehab temporarily.  Regardless either way I think she is more than medically stable for discharge.  If we can coordinate all of her discharge needs today then myself as well as the daughter are amenable to the above plan and if for some reason we are unable to get certification today and logistically get this coordinated then I do not think there is any harm to hold her overnight and try for tomorrow.      Addendum -discharge was canceled secondary to logistic reasons yesterday.  I have stayed in close contact with daughter and discussed the case with her again today.  CCP is checking into additional locations as she will have to transition to subacute rehab before she is able to return back to assisted living at the [facility].        Condition on Discharge: Improved.     Temp:  [97.7 °F (36.5 °C)-98.6 °F (37 °C)] 98.6 °F (37 °C)  Heart Rate:  [] 97  Resp:  [16-18] 16  BP: (138-157)/(85-97) 157/85  Body mass index is 17.97 kg/m².    Physical Exam  HENT:      Head: Normocephalic.      Mouth/Throat:      Mouth: Mucous membranes are moist.      Pharynx: Oropharynx is clear.   Eyes:      General: No scleral icterus.     Conjunctiva/sclera: Conjunctivae normal.   Cardiovascular:      Rate and Rhythm: Normal rate and regular rhythm.   Pulmonary:      Effort: Pulmonary effort is normal. No respiratory distress.      Breath sounds: Normal breath sounds.   Abdominal:      General: Bowel sounds are normal. There is no distension.      Palpations: Abdomen is soft.      Tenderness: There is no abdominal tenderness.   Musculoskeletal:         General: No swelling.   Skin:     General: Skin is warm and dry.   Neurological:      Mental Status: She is  alert. Mental status is at baseline.     [unfilled]    Disposition: Skilled Nursing Facility (DC - External)       Discharge Medications      Continue These Medications      Instructions Start Date   albuterol 1.25 MG/3ML nebulizer solution  Commonly known as: ACCUNEB   No dose, route, or frequency recorded.      amLODIPine 2.5 MG tablet  Commonly known as: NORVASC   2.5 mg, Oral, Daily, 1/2 tab in am      Brovana 15 MCG/2ML nebulizer solution  Generic drug: arformoterol   15 mcg, 2 Times Daily - RT      budesonide 0.5 MG/2ML nebulizer solution  Commonly known as: PULMICORT   USE 1 VIAL IN NEBULIZER TWICE DAILY      citalopram 20 MG tablet  Commonly known as: CeleXA   20 mg, Oral, Daily      donepezil 5 MG tablet  Commonly known as: ARICEPT   10 mg, Oral, Nightly      mirtazapine 7.5 MG half tablet  Commonly known as: REMERON   7.5 mg, Oral, Nightly      MV-ONE PO   1 tablet, Oral, Daily      primidone 50 MG tablet  Commonly known as: MYSOLINE   150 mg, Oral, Every Morning      Probiotic-10 chewable tablet   1 tablet, Oral              Additional Instructions for the Follow-ups that You Need to Schedule     Discharge Follow-up with PCP   As directed       Currently Documented PCP:    Sapphire Mckeon MD    PCP Phone Number:    [phone]     Follow Up Details: pcp 1-2 weeks            Contact information for follow-up providers     Sapphire Mckeon MD .    Specialty: Geriatric Medicine  Why: pcp 1-2 weeks  Contact information:  [address] [place] [address] [phone]                   Contact information for after-discharge care     Destination     [facility] .    Service: Skilled Nursing  Contact information:  [address] [facility] [address] [phone]                               Taiwo Siegel MD  01/05/21  10:38 EST    Discharge time spent greater than 30 minutes.

## 2021-03-02 DIAGNOSIS — Z23 IMMUNIZATION DUE: ICD-10-CM

## (undated) DEVICE — INTEGUSEAL MICROBIAL SEALANT: Brand: AVANOS

## (undated) DEVICE — ENCORE® LATEX ORTHO SIZE 8, STERILE LATEX POWDER-FREE SURGICAL GLOVE: Brand: ENCORE

## (undated) DEVICE — Device: Brand: 2.0/2.4MM FRACTURE SYSTEM

## (undated) DEVICE — MAT FLR ABSORBENT LG 4FT 10 2.5FT

## (undated) DEVICE — HANDPIECE SET WITH COAXIAL HIGH FLOW TIP AND SUCTION TUBE: Brand: INTERPULSE

## (undated) DEVICE — NCB SCREW 5.0   L = 50
Type: IMPLANTABLE DEVICE | Site: FEMUR | Status: NON-FUNCTIONAL
Brand: NCB®
Removed: 2018-02-18

## (undated) DEVICE — Device: Brand: NCB®

## (undated) DEVICE — DRSNG WND GZ CURAD OIL EMULSION 3X8IN LF STRL 1PK

## (undated) DEVICE — ANTIBACTERIAL UNDYED BRAIDED (POLYGLACTIN 910), SYNTHETIC ABSORBABLE SUTURE: Brand: COATED VICRYL

## (undated) DEVICE — DRSNG BRDR MEPILEX P/OP SIL 4X8IN

## (undated) DEVICE — SUT ETHIB 2 CV V37 MS/4 30IN MX69G

## (undated) DEVICE — PK HIP TOTL 40

## (undated) DEVICE — DRSNG BRDR MEPILEX P/OP SIL 4X12IN

## (undated) DEVICE — SUT MNCRYL PLS ANTIB UD 4/0 PS2 18IN

## (undated) DEVICE — GLV SURG TRIUMPH CLASSIC PF LTX 8.5 STRL

## (undated) DEVICE — PAD,ABDOMINAL,8"X10",ST,LF: Brand: MEDLINE

## (undated) DEVICE — APPL CHLORAPREP W/TINT 26ML ORNG